# Patient Record
Sex: MALE | Race: WHITE | NOT HISPANIC OR LATINO | Employment: FULL TIME | URBAN - METROPOLITAN AREA
[De-identification: names, ages, dates, MRNs, and addresses within clinical notes are randomized per-mention and may not be internally consistent; named-entity substitution may affect disease eponyms.]

---

## 2017-08-03 ENCOUNTER — TELEPHONE (OUTPATIENT)
Dept: FAMILY MEDICINE | Facility: CLINIC | Age: 57
End: 2017-08-03

## 2017-08-03 NOTE — TELEPHONE ENCOUNTER
Panel Management Review      Patient has the following on his problem list: None      Composite cancer screening  Chart review shows that this patient is due/due soon for the following Fecal Colorectal (FIT)  Summary:    Patient is due/failing the following:   FIT    Action needed:   Patient needs non-fasting lab only appointment to  the FIT test for colon cancer screening.     Type of outreach:    Sent letter.    Questions for provider review:    None                                                                                                                                    Lisa Magill, CMA

## 2017-08-03 NOTE — LETTER
[address], [address]  [facility] [address] [phone]  August 25, 2017    Clarence Nam [place]  [address] [facility] [facility] [address]      Dear Clarence,    I care about your health and have reviewed your health plan.  I have reviewed your medical conditions, medication list, and lab results and am making recommendations  based on this review, to better manage your health.    You are particularly in need of attention regarding:  -Colon Cancer Screening     I am recommending that you:  -schedule a COLONOSCOPY to look for colon cancer (due every 10 years or 5 years in higher risk situations.)   Colon cancer is now the second leading cause of death in the [place] for both men and women and there are over 130,000 new cases and 50,000 deaths per year from colon cancer.  Colonoscopies can prevent 90-95% of these deaths.  Problem lesions can be removed before they ever become cancer.  This test is not only looking for cancer, but also getting rid of precancerious lesions.  If you do not wish to do a colonoscopy or cannot afford to do one, at this time, there is another option. It is called a FIT test or Fecal Immunochemical Occult Blood Test (take home stool sample kit).  It does not replace the colonoscopy for colorectal cancer screening, but it can detect hidden bleeding in the lower colon.  It does need to be repeated every year and if a positive result is obtained, you would be referred for a colonoscopy.  If you have completed either one of these tests at another facility, please have the records sent to our clinic so that we can best coordinate your care.   Here is a list of Health Maintenance topics that are due now or due soon:  Health Maintenance Due   Topic Date Due     COLON CANCER SCREEN (SYSTEM ASSIGNED)  01/18/2012     ADVANCE DIRECTIVE PLANNING Q5 YRS  01/18/2017     Please call us at [phone] (or use [employer]) to address the above    Recommendations.    Thank you for trusting [facility] and we appreciate the opportunity to serve you.  We look forward to supporting your healthcare needs in the future.    Healthy Regards,      Bharath Love MD

## 2018-09-25 ENCOUNTER — OFFICE VISIT (OUTPATIENT)
Dept: FAMILY MEDICINE | Facility: CLINIC | Age: 58
End: 2018-09-25
Payer: COMMERCIAL

## 2018-09-25 DIAGNOSIS — R03.0 ELEVATED BP WITHOUT DIAGNOSIS OF HYPERTENSION: ICD-10-CM
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
## 2018-09-25 DIAGNOSIS — Z12.11 SPECIAL SCREENING FOR MALIGNANT NEOPLASMS, COLON: ICD-10-CM

## 2018-09-25 DIAGNOSIS — S80.861A TICK BITE OF RIGHT LOWER LEG, INITIAL ENCOUNTER: Primary | ICD-10-CM

## 2018-09-25 PROCEDURE — 99213 OFFICE O/P EST LOW 20 MIN: CPT | Performed by: NURSE PRACTITIONER

## 2018-09-25 RX ORDER — DOXYCYCLINE 100 MG/1
100 CAPSULE ORAL 2 TIMES DAILY
Qty: 20 CAPSULE | Refills: 0 | Status: SHIPPED | OUTPATIENT
Start: 2018-09-25 | End: 2018-10-05

## 2018-09-25 NOTE — PROGRESS NOTES
"  SUBJECTIVE:   Clarence Tai is a 58 year old male who presents to clinic today for the following health issues:      Concern - Insect bite   Onset: x 3 days     Description:   Small tick was imbedded behind the Right knee- Was bulled out and there is a red sore now.    Intensity:     Progression of Symptoms:  worsening    Accompanying Signs & Symptoms:  Looks like it's getting infected    Previous history of similar problem:   none    Precipitating factors:   Worsened by: none    Alleviating factors:  Improved by: none    Therapies Tried and outcome: nothing    Pulled tick off 2 days ago.  He thinks the tick was attached for about 24 hours, maybe more.    Seeing a red ring around where the tick was.    No drainage.  No fevers.     Problem list and histories reviewed & adjusted, as indicated.  Additional history: as documented    Current Outpatient Prescriptions   Medication Sig Dispense Refill     doxycycline monohydrate 100 MG capsule Take 1 capsule (100 mg) by mouth 2 times daily for 10 days 20 capsule 0     aspirin 81 MG tablet Take 1 tablet by mouth daily.  3     Allergies   Allergen Reactions     Nkda [No Known Drug Allergies]        Reviewed and updated as needed this visit by clinical staff  Tobacco  Allergies  Meds  Problems       Reviewed and updated as needed this visit by Provider  Allergies  Meds  Problems         ROS:  Constitutional, HEENT, cardiovascular, pulmonary, gi and gu systems are negative, except as otherwise noted.    OBJECTIVE:     BP (!) 138/92  Pulse 83  Temp 98  F (36.7  C) (Oral)  Resp 12  Ht 5' 10\" (1.778 m)  Wt 203 lb 4.8 oz (92.2 kg)  SpO2 94%  BMI 29.17 kg/m2  Body mass index is 29.17 kg/(m^2).  GENERAL: healthy, alert and no distress  RESP: lungs clear to auscultation - no rales, rhonchi or wheezes  CV: regular rate and rhythm, normal S1 S2, no S3 or S4, no murmur, click or rub  MS: no gross musculoskeletal defects noted  SKIN: R calf: small bruise with erythematous " rash around it, central clearing   NEURO: Normal strength and tone, mentation intact and speech normal    Diagnostic Test Results:  none     ASSESSMENT/PLAN:   1. Tick bite of right lower leg, initial encounter  With bullseye rash.    - doxycycline monohydrate 100 MG capsule; Take 1 capsule (100 mg) by mouth 2 times daily for 10 days  Dispense: 20 capsule; Refill: 0    2. Special screening for malignant neoplasms, colon  Declines colonoscopy.   - Fecal colorectal cancer screen FIT; Future    3. Elevated BP without diagnosis of hypertension  Recheck in 2 weeks.       F/u 2 wks    HUY Leyva [facility]

## 2018-09-25 NOTE — MR AVS SNAPSHOT
After Visit Summary   9/25/2018    Clarence Tai    MRN: [id number]           Patient Information     Date Of Birth          1960        Visit Information        Provider Department      9/25/2018 3:20 PM Mamta Mata APRN CNP [facility]        Today's Diagnoses     Tick bite of right lower leg, initial encounter    -  1    Special screening for malignant neoplasms, colon           Follow-ups after your visit        Follow-up notes from your care team     Return in about 2 weeks (around 10/9/2018).      Future tests that were ordered for you today     Open Future Orders        Priority Expected Expires Ordered    Fecal colorectal cancer screen FIT Routine 10/16/2018 12/18/2018 9/25/2018            Who to contact     If you have questions or need follow up information about today's clinic visit or your schedule please contact [facility] directly at [phone].  Normal or non-critical lab and imaging results will be communicated to you by MyChart, letter or phone within 4 business days after the clinic has received the results. If you do not hear from us within 7 days, please contact the clinic through [employer] or phone. If you have a critical or abnormal lab result, we will notify you by phone as soon as possible.  Submit refill requests through [employer] or call your pharmacy and they will forward the refill request to us. Please allow 3 business days for your refill to be completed.          Additional Information About Your Visit        MyChart Information     [employer] gives you secure access to your electronic health record. If you see a primary care provider, you can also send messages to your care team and make appointments. If you have questions, please call your primary care clinic.  If you do not have a primary care provider, please call [phone] and they will assist you.        Care EveryWhere ID     This is your Care EveryWhere ID. This  "could be used by other organizations to access your [place] medical records  [id number]        Your Vitals Were     Pulse Temperature Respirations Height Pulse Oximetry BMI (Body Mass Index)    83 98  F (36.7  C) (Oral) 12 5' 10\" (1.778 m) 94% 29.17 kg/m2       Blood Pressure from Last 3 Encounters:   09/25/18 (!) 142/92   10/10/16 116/88   12/29/14 (!) 128/98    Weight from Last 3 Encounters:   09/25/18 203 lb 4.8 oz (92.2 kg)   10/10/16 198 lb (89.8 kg)   12/29/14 197 lb (89.4 kg)                 Today's Medication Changes          These changes are accurate as of 9/25/18  4:03 PM.  If you have any questions, ask your nurse or doctor.               Start taking these medicines.        Dose/Directions    doxycycline monohydrate 100 MG capsule   Used for:  Tick bite of right lower leg, initial encounter   Started by:  Mamta Mata APRN CNP        Dose:  100 mg   Take 1 capsule (100 mg) by mouth 2 times daily for 10 days   Quantity:  20 capsule   Refills:  0            Where to get your medicines      These medications were sent to [facility] [address] [place], [place] [address]     Phone:  [phone]     doxycycline monohydrate 100 MG capsule                Primary Care Provider Office Phone # Fax #    Bharath Love -[phone]       [id number]  [facility] [address]        Equal Access to Services     [facility] AH: Hadii bernie ku hadasho Soomaali, waaxda luqadaha, qaybta kaalmada adeegyada, clyde wylie. So [facility] [phone].    ATENCIÓN: Si habla español, tiene a diana disposición servicios gratuitos de asistencia lingüística. Llame al [phone].    We comply with applicable federal civil rights laws and [place] laws. We do not discriminate on the basis of race, color, national origin, age, disability, sex, sexual orientation, or gender identity.            Thank you!     Thank you for " choosing [facility]  for your care. Our goal is always to provide you with excellent care. Hearing back from our patients is one way we can continue to improve our services. Please take a few minutes to complete the written survey that you may receive in the mail after your visit with us. Thank you!             Your Updated Medication List - Protect others around you: Learn how to safely use, store and throw away your medicines at www.disposemymeds.org.          This list is accurate as of 9/25/18  4:03 PM.  Always use your most recent med list.                   Brand Name Dispense Instructions for use Diagnosis    aspirin 81 MG tablet      Take 1 tablet by mouth daily.        doxycycline monohydrate 100 MG capsule     20 capsule    Take 1 capsule (100 mg) by mouth 2 times daily for 10 days    Tick bite of right lower leg, initial encounter

## 2018-10-02 VITALS
HEART RATE: 83 BPM | DIASTOLIC BLOOD PRESSURE: 92 MMHG | BODY MASS INDEX: 29.11 KG/M2 | OXYGEN SATURATION: 94 % | TEMPERATURE: 98 F | SYSTOLIC BLOOD PRESSURE: 138 MMHG | WEIGHT: 203.3 LBS | RESPIRATION RATE: 12 BRPM | HEIGHT: 70 IN

## 2018-10-24 ENCOUNTER — OFFICE VISIT (OUTPATIENT)
Dept: FAMILY MEDICINE | Facility: CLINIC | Age: 58
End: 2018-10-24
Payer: COMMERCIAL

## 2018-10-24 VITALS
TEMPERATURE: 98.3 F | HEART RATE: 79 BPM | BODY MASS INDEX: 29.37 KG/M2 | WEIGHT: 204.7 LBS | RESPIRATION RATE: 22 BRPM | DIASTOLIC BLOOD PRESSURE: 104 MMHG | SYSTOLIC BLOOD PRESSURE: 152 MMHG | OXYGEN SATURATION: 96 %

## 2018-10-24 DIAGNOSIS — N52.9 ERECTILE DYSFUNCTION, UNSPECIFIED ERECTILE DYSFUNCTION TYPE: Primary | ICD-10-CM

## 2018-10-24 DIAGNOSIS — Z13.6 CARDIOVASCULAR SCREENING; LDL GOAL LESS THAN 130: ICD-10-CM

## 2018-10-24 DIAGNOSIS — Z12.11 SCREEN FOR COLON CANCER: ICD-10-CM

## 2018-10-24 DIAGNOSIS — I10 BENIGN ESSENTIAL HYPERTENSION: ICD-10-CM

## 2018-10-24 PROCEDURE — 99214 OFFICE O/P EST MOD 30 MIN: CPT | Performed by: FAMILY MEDICINE

## 2018-10-24 RX ORDER — LISINOPRIL 10 MG/1
10 TABLET ORAL DAILY
Qty: 30 TABLET | Refills: 1 | Status: SHIPPED | OUTPATIENT
Start: 2018-10-24 | End: 2018-11-26

## 2018-10-24 ASSESSMENT — ENCOUNTER SYMPTOMS
DYSURIA: 0
CONSTITUTIONAL NEGATIVE: 1
RESPIRATORY NEGATIVE: 1
CARDIOVASCULAR NEGATIVE: 1
FREQUENCY: 0

## 2018-10-24 NOTE — PROGRESS NOTES
SUBJECTIVE:   Clarence Nam Tai is a 58 year old male who presents to clinic today for the following health issues:    Concern - Prostate problem  Onset: ***    Description:   ***    Intensity: {.:245667}    Progression of Symptoms:  {.:884002}    Accompanying Signs & Symptoms:  ***    Previous history of similar problem:   ***    Precipitating factors:   Worsened by: ***    Alleviating factors:  Improved by: ***    Therapies Tried and outcome: ***

## 2018-10-24 NOTE — PROGRESS NOTES
HPI    SUBJECTIVE:   Clarence Tai is a 58 year old male who presents to clinic today for the following health issues:    Feels it's been about 5 years since he and his wife have last had sex.  Does note some job stress in the last few months.  Does note that he is still interested in having sex.  Does get morning erections, does not feel he ever gets erections.  No difficulty urinating (slow stream, hesitancy).  Doesn't typically get up overnight to urinate.  Does note that his energy level is not what it used to be.    No essential concerns about mood, although wife notes that he's crabby.      Review of Systems   Constitutional: Negative.    Respiratory: Negative.    Cardiovascular: Negative.    Genitourinary: Negative for dysuria and frequency.        ED         Physical Exam   Constitutional: He is oriented to person, place, and time and well-developed, well-nourished, and in no distress.   Eyes: Conjunctivae and EOM are normal.   Cardiovascular: Normal rate, regular rhythm and normal heart sounds.    Pulmonary/Chest: Effort normal and breath sounds normal.   Genitourinary: Testes/scrotum normal and penis normal. No discharge found.   Musculoskeletal: He exhibits no edema.   Neurological: He is alert and oriented to person, place, and time.   Skin: Skin is warm and dry.   Vitals reviewed.    (N52.9) Erectile dysfunction, unspecified erectile dysfunction type  (primary encounter diagnosis)  Comment: sounds like it might be more hormonal as does not have a lot of vasuclar risk, although am diagnosing HTN today, this is on themilder side of the spectrum  Plan: Testosterone Free and Total, TSH with free T4         reflex, Comprehensive metabolic panel, CBC with        platelets            (Z12.11) Screen for colon cancer  Comment:   Plan: has FIT at home    (Z13.6) CARDIOVASCULAR SCREENING; LDL GOAL LESS THAN 130  Comment:   Plan: Lipid panel reflex to direct LDL Fasting            (I10) Benign essential  hypertension  Comment: starting meds today  Plan: lisinopril (PRINIVIL/ZESTRIL) 10 MG tablet              RTC in 1m    Bharath Love MD

## 2018-10-29 DIAGNOSIS — N52.9 ERECTILE DYSFUNCTION, UNSPECIFIED ERECTILE DYSFUNCTION TYPE: ICD-10-CM

## 2018-10-29 DIAGNOSIS — Z13.6 CARDIOVASCULAR SCREENING; LDL GOAL LESS THAN 130: ICD-10-CM

## 2018-10-29 LAB
ERYTHROCYTE [DISTWIDTH] IN BLOOD BY AUTOMATED COUNT: 12 % (ref 10–15)
HCT VFR BLD AUTO: 49.2 % (ref 40–53)
HGB BLD-MCNC: 16.5 G/DL (ref 13.3–17.7)
MCH RBC QN AUTO: 30.4 PG (ref 26.5–33)
MCHC RBC AUTO-ENTMCNC: 33.5 G/DL (ref 31.5–36.5)
MCV RBC AUTO: 91 FL (ref 78–100)
PLATELET # BLD AUTO: 252 10E9/L (ref 150–450)
RBC # BLD AUTO: 5.43 10E12/L (ref 4.4–5.9)
WBC # BLD AUTO: 4.5 10E9/L (ref 4–11)

## 2018-10-29 PROCEDURE — 85027 COMPLETE CBC AUTOMATED: CPT | Performed by: FAMILY MEDICINE

## 2018-10-29 PROCEDURE — 84443 ASSAY THYROID STIM HORMONE: CPT | Performed by: FAMILY MEDICINE

## 2018-10-29 PROCEDURE — 36415 COLL VENOUS BLD VENIPUNCTURE: CPT | Performed by: FAMILY MEDICINE

## 2018-10-29 PROCEDURE — 84403 ASSAY OF TOTAL TESTOSTERONE: CPT | Performed by: FAMILY MEDICINE

## 2018-10-29 PROCEDURE — 80061 LIPID PANEL: CPT | Performed by: FAMILY MEDICINE

## 2018-10-29 PROCEDURE — 80053 COMPREHEN METABOLIC PANEL: CPT | Performed by: FAMILY MEDICINE

## 2018-10-29 PROCEDURE — 84270 ASSAY OF SEX HORMONE GLOBUL: CPT | Performed by: FAMILY MEDICINE

## 2018-10-30 LAB
ALBUMIN SERPL-MCNC: 4 G/DL (ref 3.4–5)
ALP SERPL-CCNC: 79 U/L (ref 40–150)
ALT SERPL W P-5'-P-CCNC: 43 U/L (ref 0–70)
ANION GAP SERPL CALCULATED.3IONS-SCNC: 8 MMOL/L (ref 3–14)
AST SERPL W P-5'-P-CCNC: 24 U/L (ref 0–45)
BILIRUB SERPL-MCNC: 0.5 MG/DL (ref 0.2–1.3)
BUN SERPL-MCNC: 13 MG/DL (ref 7–30)
CALCIUM SERPL-MCNC: 9 MG/DL (ref 8.5–10.1)
CHLORIDE SERPL-SCNC: 107 MMOL/L (ref 94–109)
CHOLEST SERPL-MCNC: 157 MG/DL
CO2 SERPL-SCNC: 25 MMOL/L (ref 20–32)
CREAT SERPL-MCNC: 1.03 MG/DL (ref 0.66–1.25)
GFR SERPL CREATININE-BSD FRML MDRD: 74 ML/MIN/1.7M2
GLUCOSE SERPL-MCNC: 113 MG/DL (ref 70–99)
HDLC SERPL-MCNC: 36 MG/DL
LDLC SERPL CALC-MCNC: 88 MG/DL
NONHDLC SERPL-MCNC: 121 MG/DL
POTASSIUM SERPL-SCNC: 4 MMOL/L (ref 3.4–5.3)
PROT SERPL-MCNC: 7.3 G/DL (ref 6.8–8.8)
SODIUM SERPL-SCNC: 140 MMOL/L (ref 133–144)
TRIGL SERPL-MCNC: 166 MG/DL
TSH SERPL DL<=0.005 MIU/L-ACNC: 0.66 MU/L (ref 0.4–4)

## 2018-10-31 LAB
SHBG SERPL-SCNC: 29 NMOL/L (ref 11–80)
TESTOST FREE SERPL-MCNC: 7.22 NG/DL (ref 4.7–24.4)
TESTOST SERPL-MCNC: 337 NG/DL (ref 240–950)

## 2018-11-23 ENCOUNTER — TELEPHONE (OUTPATIENT)
Dept: FAMILY MEDICINE | Facility: CLINIC | Age: 58
End: 2018-11-23

## 2018-11-23 NOTE — TELEPHONE ENCOUNTER
Reason for Call:  Form, our goal is to have forms completed with 72 hours, however, some forms may require a visit or additional information.    Type of letter, form or note:  Biometric    Who is the form from?: Patient    Where did the form come from: Patient or family brought in       What clinic location was the form placed at?: [facility]     Where the form was placed: [facility] [employer]    What number is listed as a contact on the form?: PT at [phone] (OK to LM)       Additional comments: Please complete, fax and call so he can  the original.    Call taken on 11/23/2018 at 1:28 PM by PREM BROWN

## 2018-11-26 DIAGNOSIS — I10 BENIGN ESSENTIAL HYPERTENSION: ICD-10-CM

## 2018-11-26 NOTE — TELEPHONE ENCOUNTER
Faxed form to [phone], then forms were sent to [employer] and a temporary copy is kept in a folder at the [facility] nurses station.      Milton Blanc   11/26/18 3:32 PM

## 2018-11-26 NOTE — TELEPHONE ENCOUNTER
lisinopril (PRINIVIL/ZESTRIL) 10 MG tablet   Last Written Prescription Date:  10/24/2018  Last Fill Quantity: 30,   # refills: 1  Last Office Visit: 09/25/2018  Future Office visit:    Next 5 appointments (look out 90 days)     Nov 28, 2018  4:00 PM CST   SHORT with Bharath Love MD   [facility]    [address] [place] [address], [address] [address] [phone]                   Routing refill request to provider for review/approval because:  Pt has appt on 11/28 but will be out of medication before that.  Thought appt was 11/26

## 2018-11-27 RX ORDER — LISINOPRIL 10 MG/1
10 TABLET ORAL DAILY
Qty: 30 TABLET | Refills: 0 | Status: SHIPPED | OUTPATIENT
Start: 2018-11-27 | End: 2018-11-28

## 2018-11-28 ENCOUNTER — OFFICE VISIT (OUTPATIENT)
Dept: FAMILY MEDICINE | Facility: CLINIC | Age: 58
End: 2018-11-28
Payer: COMMERCIAL

## 2018-11-28 VITALS
TEMPERATURE: 98 F | DIASTOLIC BLOOD PRESSURE: 84 MMHG | SYSTOLIC BLOOD PRESSURE: 114 MMHG | WEIGHT: 203.5 LBS | RESPIRATION RATE: 20 BRPM | BODY MASS INDEX: 29.2 KG/M2 | HEART RATE: 76 BPM

## 2018-11-28 DIAGNOSIS — R05.9 COUGH: ICD-10-CM

## 2018-11-28 DIAGNOSIS — N52.02 CORPORO-VENOUS OCCLUSIVE ERECTILE DYSFUNCTION: ICD-10-CM

## 2018-11-28 DIAGNOSIS — Z12.11 SPECIAL SCREENING FOR MALIGNANT NEOPLASMS, COLON: ICD-10-CM

## 2018-11-28 DIAGNOSIS — I10 BENIGN ESSENTIAL HYPERTENSION: Primary | ICD-10-CM

## 2018-11-28 PROCEDURE — 99214 OFFICE O/P EST MOD 30 MIN: CPT | Performed by: FAMILY MEDICINE

## 2018-11-28 PROCEDURE — 36415 COLL VENOUS BLD VENIPUNCTURE: CPT | Performed by: FAMILY MEDICINE

## 2018-11-28 PROCEDURE — 80048 BASIC METABOLIC PNL TOTAL CA: CPT | Performed by: FAMILY MEDICINE

## 2018-11-28 RX ORDER — OMEPRAZOLE 40 MG/1
40 CAPSULE, DELAYED RELEASE ORAL DAILY
Qty: 30 CAPSULE | Refills: 1 | Status: SHIPPED | OUTPATIENT
Start: 2018-11-28 | End: 2019-12-23

## 2018-11-28 RX ORDER — SILDENAFIL 100 MG/1
100 TABLET, FILM COATED ORAL DAILY PRN
Qty: 6 TABLET | Refills: 1 | Status: SHIPPED | OUTPATIENT
Start: 2018-11-28 | End: 2024-01-12

## 2018-11-28 RX ORDER — LISINOPRIL 10 MG/1
10 TABLET ORAL DAILY
Qty: 90 TABLET | Refills: 1 | Status: SHIPPED | OUTPATIENT
Start: 2018-11-28 | End: 2019-04-04

## 2018-11-28 NOTE — PROGRESS NOTES
HPI    SUBJECTIVE:   Clarence Tai is a 58 year old male who presents to clinic today for the following health issues:    Hypertension Follow-up      Outpatient blood pressures are not being checked.    Low Salt Diet: no added salt      Amount of exercise or physical activity: None    Problems taking medications regularly: No    Medication side effects: none    Diet: low salt    Tolerating meds well.  NO issues with side effects.  No CP, dyspnea, HA, vision changes, edema.    Notes has been coughing for quite a long time, years.  Preceds starting of medication.  Rare wheezing and shortness of breath.      Review of Systems   Constitutional: Negative.    Eyes: Negative for blurred vision and double vision.   Respiratory: Negative for shortness of breath.    Cardiovascular: Negative for chest pain and palpitations.   Neurological: Negative for headaches.         Physical Exam   Constitutional: He is oriented to person, place, and time and well-developed, well-nourished, and in no distress.   Eyes: Conjunctivae and EOM are normal.   Cardiovascular: Normal rate, regular rhythm and normal heart sounds.    Pulmonary/Chest: Effort normal and breath sounds normal.   Musculoskeletal: He exhibits no edema.   Neurological: He is alert and oriented to person, place, and time.   Skin: Skin is warm and dry.   Vitals reviewed.    (I10) Benign essential hypertension  (primary encounter diagnosis)  Comment: well controlled, will check electrolytes  Plan: lisinopril (PRINIVIL/ZESTRIL) 10 MG tablet,         Basic metabolic panel            (Z12.11) Special screening for malignant neoplasms, colon  Comment:   Plan: Fecal colorectal cancer screen (FIT)            (R05) Cough  Comment: trial, may be GERD  Plan: omeprazole (PRILOSEC) 40 MG DR capsule      (N52.02) Corporo-venous occlusive erectile dysfunction  Comment:   Plan: sildenafil (VIAGRA) 100 MG tablet            RTC in 1m    Bharath Love MD

## 2018-11-28 NOTE — MR AVS SNAPSHOT
After Visit Summary   11/28/2018    Clarence Tai    MRN: [id number]           Patient Information     Date Of Birth          1960        Visit Information        Provider Department      11/28/2018 4:00 PM Bharath Love MD [facility]        Today's Diagnoses     Benign essential hypertension    -  1    Special screening for malignant neoplasms, colon        Cough           Follow-ups after your visit        Follow-up notes from your care team     Return in about 1 month (around 12/28/2018).      Future tests that were ordered for you today     Open Future Orders        Priority Expected Expires Ordered    Fecal colorectal cancer screen (FIT) Routine 12/19/2018 2/20/2019 11/28/2018            Who to contact     If you have questions or need follow up information about today's clinic visit or your schedule please contact [facility] directly at [phone].  Normal or non-critical lab and imaging results will be communicated to you by MyChart, letter or phone within 4 business days after the clinic has received the results. If you do not hear from us within 7 days, please contact the clinic through [employer] or phone. If you have a critical or abnormal lab result, we will notify you by phone as soon as possible.  Submit refill requests through [employer] or call your pharmacy and they will forward the refill request to us. Please allow 3 business days for your refill to be completed.          Additional Information About Your Visit        MyChart Information     [employer] gives you secure access to your electronic health record. If you see a primary care provider, you can also send messages to your care team and make appointments. If you have questions, please call your primary care clinic.  If you do not have a primary care provider, please call [phone] and they will assist you.        Care EveryWhere ID     This is your Care EveryWhere ID. This could be  used by other organizations to access your [place] medical records  [id number]        Your Vitals Were     Pulse Temperature Respirations BMI (Body Mass Index)          76 98  F (36.7  C) (Oral) 20 29.2 kg/m2         Blood Pressure from Last 3 Encounters:   11/28/18 114/84   10/24/18 (!) 152/104   09/25/18 (!) 138/92    Weight from Last 3 Encounters:   11/28/18 203 lb 8 oz (92.3 kg)   10/24/18 204 lb 11.2 oz (92.9 kg)   09/25/18 203 lb 4.8 oz (92.2 kg)              We Performed the Following     Basic metabolic panel          Today's Medication Changes          These changes are accurate as of 11/28/18  4:35 PM.  If you have any questions, ask your nurse or doctor.               Start taking these medicines.        Dose/Directions    omeprazole 40 MG DR capsule   Commonly known as:  priLOSEC   Used for:  Cough   Started by:  Bharath Love MD        Dose:  40 mg   Take 1 capsule (40 mg) by mouth daily Take 30-60 minutes before a meal.   Quantity:  30 capsule   Refills:  1            Where to get your medicines      These medications were sent to [facility] #[address] [place], [place] [address]     Phone:  [phone]     lisinopril 10 MG tablet    omeprazole 40 MG DR capsule                Primary Care Provider Office Phone # Fax #    Bharath Love -[phone]       [id number]  [facility] [address]        Equal Access to Services     [facility] PRAMOD AH: Hadii aad ku hadasho Soomaali, waaxda luqadaha, qaybta kaalmada adeegyada, waxay af[place] wylie. So [facility] [phone].    ATENCIÓN: Si habla español, tiene a diana disposición servicios gratuitos de asistencia lingüística. Llame al [phone].    We comply with applicable federal civil rights laws and [place] laws. We do not discriminate on the basis of race, color, national origin, age, disability, sex, sexual orientation, or gender identity.            Thank  you!     Thank you for choosing [facility]  for your care. Our goal is always to provide you with excellent care. Hearing back from our patients is one way we can continue to improve our services. Please take a few minutes to complete the written survey that you may receive in the mail after your visit with us. Thank you!             Your Updated Medication List - Protect others around you: Learn how to safely use, store and throw away your medicines at www.disposemymeds.org.          This list is accurate as of 11/28/18  4:35 PM.  Always use your most recent med list.                   Brand Name Dispense Instructions for use Diagnosis    lisinopril 10 MG tablet    PRINIVIL/ZESTRIL    90 tablet    Take 1 tablet (10 mg) by mouth daily    Benign essential hypertension       omeprazole 40 MG DR capsule    priLOSEC    30 capsule    Take 1 capsule (40 mg) by mouth daily Take 30-60 minutes before a meal.    Cough

## 2018-11-30 LAB
ANION GAP SERPL CALCULATED.3IONS-SCNC: 11 MMOL/L (ref 3–14)
BUN SERPL-MCNC: 14 MG/DL (ref 7–30)
CALCIUM SERPL-MCNC: 9.2 MG/DL (ref 8.5–10.1)
CHLORIDE SERPL-SCNC: 107 MMOL/L (ref 94–109)
CO2 SERPL-SCNC: 22 MMOL/L (ref 20–32)
CREAT SERPL-MCNC: 0.99 MG/DL (ref 0.66–1.25)
GFR SERPL CREATININE-BSD FRML MDRD: 78 ML/MIN/1.7M2
GLUCOSE SERPL-MCNC: 121 MG/DL (ref 70–99)
POTASSIUM SERPL-SCNC: 4.2 MMOL/L (ref 3.4–5.3)
SODIUM SERPL-SCNC: 140 MMOL/L (ref 133–144)

## 2018-11-30 ASSESSMENT — ENCOUNTER SYMPTOMS
DOUBLE VISION: 0
PALPITATIONS: 0
BLURRED VISION: 0
SHORTNESS OF BREATH: 0
HEADACHES: 0
CONSTITUTIONAL NEGATIVE: 1

## 2019-04-04 ENCOUNTER — OFFICE VISIT (OUTPATIENT)
Dept: FAMILY MEDICINE | Facility: CLINIC | Age: 59
End: 2019-04-04
Payer: COMMERCIAL

## 2019-04-04 VITALS
DIASTOLIC BLOOD PRESSURE: 88 MMHG | SYSTOLIC BLOOD PRESSURE: 122 MMHG | BODY MASS INDEX: 28.92 KG/M2 | OXYGEN SATURATION: 96 % | HEART RATE: 95 BPM | HEIGHT: 70 IN | TEMPERATURE: 98 F | WEIGHT: 202 LBS | RESPIRATION RATE: 20 BRPM

## 2019-04-04 DIAGNOSIS — I10 BENIGN ESSENTIAL HYPERTENSION: ICD-10-CM

## 2019-04-04 DIAGNOSIS — J20.9 ACUTE BRONCHITIS WITH SYMPTOMS > 10 DAYS: Primary | ICD-10-CM

## 2019-04-04 LAB
FEF 25/75: 3.91
FEV-1: 4.7
FEV1/FVC: NORMAL
FVC: 23.61

## 2019-04-04 PROCEDURE — 99213 OFFICE O/P EST LOW 20 MIN: CPT | Mod: 25 | Performed by: FAMILY MEDICINE

## 2019-04-04 PROCEDURE — 94010 BREATHING CAPACITY TEST: CPT | Performed by: FAMILY MEDICINE

## 2019-04-04 RX ORDER — ALBUTEROL SULFATE 90 UG/1
2 AEROSOL, METERED RESPIRATORY (INHALATION) EVERY 6 HOURS PRN
Qty: 18 G | Refills: 1 | Status: SHIPPED | OUTPATIENT
Start: 2019-04-04 | End: 2020-09-25

## 2019-04-04 RX ORDER — LISINOPRIL 10 MG/1
10 TABLET ORAL DAILY
Qty: 90 TABLET | Refills: 1 | Status: SHIPPED | OUTPATIENT
Start: 2019-04-04 | End: 2020-01-10

## 2019-04-04 RX ORDER — DOXYCYCLINE HYCLATE 100 MG
100 TABLET ORAL 2 TIMES DAILY
Qty: 20 TABLET | Refills: 0 | Status: SHIPPED | OUTPATIENT
Start: 2019-04-04 | End: 2019-04-17

## 2019-04-04 RX ORDER — CODEINE PHOSPHATE AND GUAIFENESIN 10; 100 MG/5ML; MG/5ML
1-2 SOLUTION ORAL EVERY 4 HOURS PRN
Qty: 4 OZ | Refills: 0 | Status: SHIPPED | OUTPATIENT
Start: 2019-04-04 | End: 2020-09-25

## 2019-04-04 ASSESSMENT — MIFFLIN-ST. JEOR: SCORE: 1743.55

## 2019-04-04 NOTE — PROGRESS NOTES
SUBJECTIVE:   Clarence Tai is a 59 year old male who presents to clinic today for the following health issues:    Patient present with wife.     Acute Illness   Acute illness concerns: cough  Onset: 3-5weeks    Fever: no    Chills/Sweats: no    Headache (location?): no    Sinus Pressure:YES- post-nasal drainage    Conjunctivitis:  YES: bilateral    Ear Pain: no    Rhinorrhea: YES    Congestion: YES- chest    Sore Throat: no     Cough: YES-productive of green sputum, with shortness of breath, worsening over time    Wheeze: no    Decreased Appetite: no    Nausea: no    Vomiting: no    Diarrhea:  no    Dysuria/Freq.: no    Fatigue/Achiness: YES- BOTH     Sick/Strep Exposure: no     Therapies Tried and outcome: cough drops and robotussin and sleep aid.  Helped a little    Patient has been sick the past 3-5 weeks with a cough. Cough is productive. He will have coughing spells that last about 30 seconds and make him very SOB. His chest is also sore from the coughing. He has difficulty sleeping because of the cough. Last night he took robitussin. He has not tried taking his temp but he endorses feeling warm today. Also notes rhinorrhea. He has no hx of smoking but is exposed to second hand smoke. He has never been diagnosed with asthma but has been given inhalers in the past.     Wife mentions that he has had a chronic cough for many years and has been seen for this. At one point they were considering a spirometry.     Colon cancer screening  Patient declines colonoscopy and FIT tests.     Problem list and histories reviewed & adjusted, as indicated.  Additional history: as documented    Recent Labs   Lab Test 11/28/18  1640 10/29/18  0753 10/10/16  0833   LDL  --  88 96   HDL  --  36* 39*   TRIG  --  166* 126   ALT  --  43  --    CR 0.99 1.03 1.06   GFRESTIMATED 78 74 73   GFRESTBLACK >90 >90 88   POTASSIUM 4.2 4.0 3.7   TSH  --  0.66  --       BP Readings from Last 3 Encounters:   04/04/19 122/88   11/28/18 114/84  "  10/24/18 (!) 152/104    Wt Readings from Last 3 Encounters:   04/04/19 91.6 kg (202 lb)   11/28/18 92.3 kg (203 lb 8 oz)   10/24/18 92.9 kg (204 lb 11.2 oz)          Labs reviewed in EPIC    Reviewed and updated as needed this visit by clinical staff  Tobacco  Allergies  Meds  Problems  Med Hx  Surg Hx  Fam Hx  Soc Hx        Reviewed and updated as needed this visit by Provider  Meds  Problems         ROS:  Constitutional, HEENT, cardiovascular, pulmonary, gi and gu systems are negative, except as otherwise noted.    This document serves as a record of the services and decisions personally performed and made by Estelle Gutierrez MD. It was created on her behalf by Marlene Marshall, a trained medical scribe. The creation of this document is based on the provider's statements to the medical scribe.  Marlene Marshall April 4, 2019 11:36 AM     OBJECTIVE:     /88 (BP Location: Right arm, Patient Position: Sitting, Cuff Size: Adult Regular)   Pulse 95   Temp 98  F (36.7  C) (Oral)   Resp 20   Ht 1.772 m (5' 9.75\")   Wt 91.6 kg (202 lb)   SpO2 96%   BMI 29.19 kg/m    Body mass index is 29.19 kg/m .     GENERAL: healthy, alert and no distress  HENT: ear canals and TM's normal, nose and mouth without ulcers or lesions  NECK: no adenopathy, no asymmetry, masses, or scars and thyroid normal to palpation  RESP: lungs clear to auscultation - no rales, rhonchi or wheezes  CV: regular rate and rhythm, normal S1 S2, no S3 or S4, no murmur, click or rub, no peripheral edema and peripheral pulses strong  MS: no gross musculoskeletal defects noted, no edema  SKIN: no suspicious lesions or rashes  NEURO: Normal strength and tone, mentation intact and speech normal  PSYCH: mentation appears normal, affect normal/bright    Diagnostic Test Results:  No results found for this or any previous visit (from the past 24 hour(s)).    ASSESSMENT/PLAN:   (J20.9) Acute bronchitis with symptoms > 10 days  (primary encounter " diagnosis)  Comment: No wheezing or evidence of pneumonia noted on exam. Will start patient on doxycyline and albuterol. May also take robitussin AC prn. Will attempt spirometry today due to hx of chronic cough(spirometry not valid, due to coughing).    Plan: doxycycline hyclate (VIBRA-TABS) 100 MG tablet,        albuterol (PROAIR HFA/PROVENTIL HFA/VENTOLIN         HFA) 108 (90 Base) MCG/ACT inhaler,         guaiFENesin-codeine (ROBITUSSIN AC) 100-10         MG/5ML solution          (I10) Benign essential hypertension  Comment: BP stable. Discussed that in some cases, lisinopril can cause a cough. Monitor if cough dose not improve with doxycyline and albuterol.   Plan: lisinopril (PRINIVIL/ZESTRIL) 10 MG tablet          Follow up if symptoms worsen or do not improve.    The information in this document, created by the medical scribe for me, accurately reflects the services I personally performed and the decisions made by me. I have reviewed and approved this document for accuracy prior to leaving the patient care area.  April 4, 2019 11:51 AM    Estelle Gutierrez MD  [facility]

## 2019-04-04 NOTE — NURSING NOTE
"Chief Complaint   Patient presents with     Cough     Initial /88 (BP Location: Right arm, Patient Position: Sitting, Cuff Size: Adult Regular)   Pulse 95   Temp 98  F (36.7  C) (Oral)   Resp 20   Ht 1.772 m (5' 9.75\")   Wt 91.6 kg (202 lb)   SpO2 96%   BMI 29.19 kg/m   Estimated body mass index is 29.19 kg/m  as calculated from the following:    Height as of this encounter: 1.772 m (5' 9.75\").    Weight as of this encounter: 91.6 kg (202 lb).  BP completed using cuff size regular right arm    Lisa Magill, CMA    "

## 2019-04-17 ENCOUNTER — OFFICE VISIT (OUTPATIENT)
Dept: FAMILY MEDICINE | Facility: CLINIC | Age: 59
End: 2019-04-17
Payer: COMMERCIAL

## 2019-04-17 VITALS
BODY MASS INDEX: 29.45 KG/M2 | RESPIRATION RATE: 16 BRPM | DIASTOLIC BLOOD PRESSURE: 80 MMHG | SYSTOLIC BLOOD PRESSURE: 112 MMHG | WEIGHT: 203.8 LBS | HEART RATE: 64 BPM | TEMPERATURE: 98 F

## 2019-04-17 DIAGNOSIS — G56.02 CARPAL TUNNEL SYNDROME OF LEFT WRIST: Primary | ICD-10-CM

## 2019-04-17 PROCEDURE — 99214 OFFICE O/P EST MOD 30 MIN: CPT | Performed by: FAMILY MEDICINE

## 2019-04-17 ASSESSMENT — ENCOUNTER SYMPTOMS
TREMORS: 0
CONSTITUTIONAL NEGATIVE: 1
TINGLING: 1
FOCAL WEAKNESS: 0
SENSORY CHANGE: 1

## 2019-04-17 NOTE — PROGRESS NOTES
"HPI   SUBJECTIVE:   Clarence Tai is a 59 year old male who presents to clinic today for the following   health issues:      Musculoskeletal problem/pain      Duration: November 2018    Description  Location: left hand     Intensity:  moderate, 4/10    Accompanying signs and symptoms: numbness, tingling and weakness of left hand.  Burning sensation.     History  Previous similar problem: no   Previous evaluation:  none    Precipitating or alleviating factors:  Trauma or overuse: YES- maybe overuse from typing.   Aggravating factors include: lifting    Therapies tried and outcome: nothing      Feels that L thumb, index and middle finger are \"fuzzy\" and feel weak, perhaps tip of his 4th finger.  Limited to hand.  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  States he is a terrible .  Prior to that worked in the [employer].  Has never had carpal tunnel or other nerve injury.  Has had burning wake him a couple of times at night.  No loss of dexterity, but does feel he can't feel what he's holding.  No meds or interventions tried.      Review of Systems   Constitutional: Negative.    Musculoskeletal: Positive for joint pain.   Neurological: Positive for tingling and sensory change. Negative for tremors and focal weakness.         Physical Exam   Constitutional: He is oriented to person, place, and time. He appears well-developed and well-nourished.   Neurological: He is alert and oriented to person, place, and time. He displays tremor.   Positive L Tinel's.  Fine tremor noted in L thumb.   and intrinsic mm strength 5/5 KANA.   Nursing note and vitals reviewed.    (G56.02) Carpal tunnel syndrome of left wrist  (primary encounter diagnosis)  Comment: classic presentation, will start ibuprofen and ice  Plan: order for DME              RTC in 2w prn    Bharath Love MD      "

## 2019-04-17 NOTE — NURSING NOTE
"Chief Complaint   Patient presents with     Hand Pain     Initial /80 (BP Location: Right arm, Patient Position: Sitting, Cuff Size: Adult Regular)   Pulse 64   Temp 98  F (36.7  C) (Oral)   Resp 16   Wt 92.4 kg (203 lb 12.8 oz)   BMI 29.45 kg/m   Estimated body mass index is 29.45 kg/m  as calculated from the following:    Height as of 4/4/19: 1.772 m (5' 9.75\").    Weight as of this encounter: 92.4 kg (203 lb 12.8 oz).  BP completed using cuff size regular right arm    Lisa Magill, CMA    "

## 2019-05-14 ENCOUNTER — OFFICE VISIT (OUTPATIENT)
Dept: FAMILY MEDICINE | Facility: CLINIC | Age: 59
End: 2019-05-14
Payer: COMMERCIAL

## 2019-05-14 VITALS
RESPIRATION RATE: 16 BRPM | SYSTOLIC BLOOD PRESSURE: 104 MMHG | WEIGHT: 206.7 LBS | TEMPERATURE: 98 F | BODY MASS INDEX: 29.87 KG/M2 | HEART RATE: 76 BPM | DIASTOLIC BLOOD PRESSURE: 76 MMHG

## 2019-05-14 DIAGNOSIS — G56.02 LEFT CARPAL TUNNEL SYNDROME: Primary | ICD-10-CM

## 2019-05-14 PROCEDURE — 99213 OFFICE O/P EST LOW 20 MIN: CPT | Performed by: FAMILY MEDICINE

## 2019-05-14 ASSESSMENT — ENCOUNTER SYMPTOMS
FOCAL WEAKNESS: 0
SENSORY CHANGE: 1
CONSTITUTIONAL NEGATIVE: 1

## 2019-05-14 NOTE — PROGRESS NOTES
HPI    SUBJECTIVE:   Clarence Tai is a 59 year old male who presents to clinic today for the following   health issues:    Concern - Carpal tunnel syndrome of left wrist  Onset: wrist pain    Description:   Continued left wrist pain with no improvement    Intensity: moderate    Progression of Symptoms:  worsening    Accompanying Signs & Symptoms:  Numbness in his fingers; can't feel his  and is at risk of dropping this    Previous history of similar problem:   Patient is questioning if this could be work comp    Precipitating factors:   Worsened by: gripping and pressure with his fingers    Alleviating factors:  Improved by: None    Therapies Tried and outcome: wearing a brace, ibuprofen; no improvement    Was pretty good with ibuprofen and ice for at least the first 2-3 weks, doesn't feel like brace is terribly helpful either.  Noting some persistent numbness in 2-3 fingertips.  Not frankly weak, but numbness makes it difficult to manipulate objects.          Review of Systems   Constitutional: Negative.    Musculoskeletal: Positive for joint pain.   Neurological: Positive for sensory change. Negative for focal weakness.         Physical Exam   Constitutional: He is oriented to person, place, and time. He appears well-developed and well-nourished.   Neurological: He is alert and oriented to person, place, and time.   Skin: Skin is warm and dry.   Vitals reviewed.    (G56.02) Left carpal tunnel syndrome  (primary encounter diagnosis)  Comment: conservative tx not helpful, will rfer to ortho for furhter eval  Plan: ORTHO  REFERRAL              RTC in     Bharath Love MD

## 2019-06-17 ENCOUNTER — OFFICE VISIT (OUTPATIENT)
Dept: ORTHOPEDICS | Facility: CLINIC | Age: 59
End: 2019-06-17
Payer: COMMERCIAL

## 2019-06-17 VITALS
HEIGHT: 70 IN | WEIGHT: 206 LBS | DIASTOLIC BLOOD PRESSURE: 70 MMHG | BODY MASS INDEX: 29.49 KG/M2 | SYSTOLIC BLOOD PRESSURE: 98 MMHG

## 2019-06-17 DIAGNOSIS — G56.02 CARPAL TUNNEL SYNDROME OF LEFT WRIST: Primary | ICD-10-CM

## 2019-06-17 PROCEDURE — 99204 OFFICE O/P NEW MOD 45 MIN: CPT | Performed by: FAMILY MEDICINE

## 2019-06-17 RX ORDER — METHYLPREDNISOLONE 4 MG
TABLET, DOSE PACK ORAL
Qty: 21 TABLET | Refills: 0 | Status: SHIPPED | OUTPATIENT
Start: 2019-06-17 | End: 2020-09-25

## 2019-06-17 RX ORDER — MELOXICAM 15 MG/1
15 TABLET ORAL DAILY
Qty: 30 TABLET | Refills: 1 | Status: SHIPPED | OUTPATIENT
Start: 2019-06-17 | End: 2020-09-25

## 2019-06-17 ASSESSMENT — MIFFLIN-ST. JEOR: SCORE: 1761.69

## 2019-06-17 NOTE — PROGRESS NOTES
ASSESSMENT & PLAN  Patient Instructions     1. Carpal tunnel syndrome of left wrist      -Patient has left hand pain and numbness due to carpal tunnel syndrome.  -Patient will continue with wrist splint at nights when he sleeps.  -Patient will start formal hand therapy and home exercise program.  -Patient will start a steroid taper and once complete, will continue with meloxicam daily for approximately 3 weeks and then on an as-needed basis afterwards.  -We also discussed the possibility of a cortisone injection in the future   -Patient will follow-up in approximately 6 weeks for reevaluation and progression of activity  -Call direct clinic number [[phone]] at any time with questions or concerns.    Albert Yeo MD [facility]  [facility]        -----    SUBJECTIVE  Clarence Nam Tai is a/an 59 year old Right handed male who is seen in consultation at the request of  Bharath Love M.D. for evaluation of left hand pain. The patient is seen with their wife.    Onset: approximately 5-6 monhts. Patient does not recall any specific injury, he started a new job in 3/2018 where he has been doing more computer work and typing.   Location of Pain: left thumb, index, and long finger.  Rating of Pain at worst: 6/10  Rating of Pain Currently: 1/10  Worsened by: constant tingling in the hand, use of the hand causes additional pain   Better with: moving the fingers.   Treatments tried: ice, ibuprofen and casting/splinting/bracing with mild relief.  Associated symptoms:  fuzzy numbness sensation in the thumb, index, and middle fingers. He notes that he is starting to feel the same sensation. With pressure on the fingers, he will note pain in addition to the tingling.  Patient does not note weakness, but due to numbness he does not feel confident that he is holding the object.   Orthopedic history: YES - left 4th finger fracture  Relevant surgical history: NO  Social  history:  Patient started a new job in 3/2018 where he is typing daily, and new activity.  [employer]    Past Medical History:   Diagnosis Date     Nephrolithiasis      Plantar fascial fibromatosis      Social History     Socioeconomic History     Marital status:      Spouse name: Charmaine     Number of children: 2     Years of education: Not on file     Highest education level: Not on file   Occupational History     Occupation: supervisor     Employer: [employer]   Social Needs     Financial resource strain: Not on file     Food insecurity:     Worry: Not on file     Inability: Not on file     Transportation needs:     Medical: Not on file     Non-medical: Not on file   Tobacco Use     Smoking status: Passive Smoke Exposure - Never Smoker     Smokeless tobacco: Never Used     Tobacco comment: passive in home   Substance and Sexual Activity     Alcohol use: Yes     Alcohol/week: 0.0 oz     Comment: 1 per week     Drug use: No     Sexual activity: Yes     Partners: Female   Lifestyle     Physical activity:     Days per week: Not on file     Minutes per session: Not on file     Stress: Not on file   Relationships     Social connections:     Talks on phone: Not on file     Gets together: Not on file     Attends Adventism service: Not on file     Active member of club or organization: Not on file     Attends meetings of clubs or organizations: Not on file     Relationship status: Not on file     Intimate partner violence:     Fear of current or ex partner: Not on file     Emotionally abused: Not on file     Physically abused: Not on file     Forced sexual activity: Not on file   Other Topics Concern     Parent/sibling w/ CABG, MI or angioplasty before 65F 55M? Not Asked   Social History Narrative     Not on file         Patient's past medical, surgical, social, and family histories were reviewed today and no changes are noted.    REVIEW OF SYSTEMS:  10 point ROS is negative other than symptoms noted above in HPI,  "Past Medical History or as stated below  Constitutional: NEGATIVE for fever, chills, change in weight  Skin: NEGATIVE for worrisome rashes, moles or lesions  GI/: NEGATIVE for bowel or bladder changes  Neuro: NEGATIVE for weakness, dizziness or paresthesias    OBJECTIVE:  BP 98/70 (BP Location: Right arm, Patient Position: Chair, Cuff Size: Adult Regular)   Ht 1.772 m (5' 9.75\")   Wt 93.4 kg (206 lb)   BMI 29.77 kg/m     General: healthy, alert and in no distress  HEENT: no scleral icterus or conjunctival erythema  Skin: no suspicious lesions or rash. No jaundice.  CV: regular rhythm by palpation  Resp: normal respiratory effort without conversational dyspnea   Psych: normal mood and affect  Gait: normal steady gait with appropriate coordination and balance  Neuro: normal light touch sensory exam of the bilateral hands.    MSK:  LEFT HAND  Inspection:    No swelling or obvious deformity or asymmetry  Palpation:   Carpals: normal   Metacarpals: normal   Thumb: normal   Fingers: normal  Range of Motion:    Full active flexion and extension at MCP, PIP, and DIP joints; normal finger cascade without malrotation.  Wrist pronation, supination, and ulnar/radial deviation normal.  Strength:    5/5  Special Tests:    Positive: Tinel's, Phalen's      Independent visualization of the below image:  No results found for this or any previous visit (from the past 24 hour(s)).        Albert Yeo MD [facility] Orthopedic [facility]    "

## 2019-06-17 NOTE — PATIENT INSTRUCTIONS
1. Carpal tunnel syndrome of left wrist      -Patient has left hand pain and numbness due to carpal tunnel syndrome.  -Patient will continue with wrist splint at nights when he sleeps.  -Patient will start formal hand therapy and home exercise program.  -Patient will start a steroid taper and once complete, will continue with meloxicam daily for approximately 3 weeks and then on an as-needed basis afterwards.  -We also discussed the possibility of a cortisone injection in the future   -Patient will follow-up in approximately 6 weeks for reevaluation and progression of activity  -Call direct clinic number [[phone]] at any time with questions or concerns.    Albert Yeo MD [facility] Orthopedics and Sports Medicine  [facility] [place]

## 2019-06-17 NOTE — LETTER
6/17/2019         RE: Clarence Tai  [address] [address] [address]        Dear Colleague,    Thank you for referring your patient, Clarence Tai, to the [facility]. Please see a copy of my visit note below.    ASSESSMENT & PLAN  Patient Instructions     1. Carpal tunnel syndrome of left wrist      -Patient has left hand pain and numbness due to carpal tunnel syndrome.  -Patient will continue with wrist splint at nights when he sleeps.  -Patient will start formal hand therapy and home exercise program.  -Patient will start a steroid taper and once complete, will continue with meloxicam daily for approximately 3 weeks and then on an as-needed basis afterwards.  -We also discussed the possibility of a cortisone injection in the future   -Patient will follow-up in approximately 6 weeks for reevaluation and progression of activity  -Call direct clinic number [[phone]] at any time with questions or concerns.    Albert Yeo MD [facility] Orthoped[facility] and Sports Medicine  [facility]        -----    SUBJECTIVE  Clarence Tai is a/an 59 year old Right handed male who is seen in consultation at the request of  Bharath Love M.D. for evaluation of left hand pain. The patient is seen with their wife.    Onset: approximately 5-6 monhts. Patient does not recall any specific injury, he started a new job in 3/2018 where he has been doing more computer work and typing.   Location of Pain: left thumb, index, and long finger.  Rating of Pain at worst: 6/10  Rating of Pain Currently: 1/10  Worsened by: constant tingling in the hand, use of the hand causes additional pain   Better with: moving the fingers.   Treatments tried: ice, ibuprofen and casting/splinting/bracing with mild relief.  Associated symptoms:  fuzzy numbness sensation in the thumb, index, and middle fingers. He notes that he is starting to feel the same sensation. With pressure on the fingers,  he will note pain in addition to the tingling.  Patient does not note weakness, but due to numbness he does not feel confident that he is holding the object.   Orthopedic history: YES - left 4th finger fracture  Relevant surgical history: NO  Social history:  Patient started a new job in 3/2018 where he is typing daily, and new activity.  [employer]    Past Medical History:   Diagnosis Date     Nephrolithiasis      Plantar fascial fibromatosis      Social History     Socioeconomic History     Marital status:      Spouse name: Charmaine     Number of children: 2     Years of education: Not on file     Highest education level: Not on file   Occupational History     Occupation: supervisor     Employer: [employer]   Social Needs     Financial resource strain: Not on file     Food insecurity:     Worry: Not on file     Inability: Not on file     Transportation needs:     Medical: Not on file     Non-medical: Not on file   Tobacco Use     Smoking status: Passive Smoke Exposure - Never Smoker     Smokeless tobacco: Never Used     Tobacco comment: passive in home   Substance and Sexual Activity     Alcohol use: Yes     Alcohol/week: 0.0 oz     Comment: 1 per week     Drug use: No     Sexual activity: Yes     Partners: Female   Lifestyle     Physical activity:     Days per week: Not on file     Minutes per session: Not on file     Stress: Not on file   Relationships     Social connections:     Talks on phone: Not on file     Gets together: Not on file     Attends Faith service: Not on file     Active member of club or organization: Not on file     Attends meetings of clubs or organizations: Not on file     Relationship status: Not on file     Intimate partner violence:     Fear of current or ex partner: Not on file     Emotionally abused: Not on file     Physically abused: Not on file     Forced sexual activity: Not on file   Other Topics Concern     Parent/sibling w/ CABG, MI or angioplasty before 65F 55M? Not Asked  "  Social History Narrative     Not on file         Patient's past medical, surgical, social, and family histories were reviewed today and no changes are noted.    REVIEW OF SYSTEMS:  10 point ROS is negative other than symptoms noted above in HPI, Past Medical History or as stated below  Constitutional: NEGATIVE for fever, chills, change in weight  Skin: NEGATIVE for worrisome rashes, moles or lesions  GI/: NEGATIVE for bowel or bladder changes  Neuro: NEGATIVE for weakness, dizziness or paresthesias    OBJECTIVE:  BP 98/70 (BP Location: Right arm, Patient Position: Chair, Cuff Size: Adult Regular)   Ht 1.772 m (5' 9.75\")   Wt 93.4 kg (206 lb)   BMI 29.77 kg/m      General: healthy, alert and in no distress  HEENT: no scleral icterus or conjunctival erythema  Skin: no suspicious lesions or rash. No jaundice.  CV: regular rhythm by palpation  Resp: normal respiratory effort without conversational dyspnea   Psych: normal mood and affect  Gait: normal steady gait with appropriate coordination and balance  Neuro: normal light touch sensory exam of the bilateral hands.    MSK:  LEFT HAND  Inspection:    No swelling or obvious deformity or asymmetry  Palpation:   Carpals: normal   Metacarpals: normal   Thumb: normal   Fingers: normal  Range of Motion:    Full active flexion and extension at MCP, PIP, and DIP joints; normal finger cascade without malrotation.  Wrist pronation, supination, and ulnar/radial deviation normal.  Strength:    5/5  Special Tests:    Positive: Tinel's, Phalen's      Independent visualization of the below image:  No results found for this or any previous visit (from the past 24 hour(s)).        Albert Yeo MD [facility]      Again, thank you for allowing me to participate in the care of your patient.        Sincerely,        Albert Yeo, MD    "

## 2019-06-24 ENCOUNTER — THERAPY VISIT (OUTPATIENT)
Dept: OCCUPATIONAL THERAPY | Facility: CLINIC | Age: 59
End: 2019-06-24
Payer: COMMERCIAL

## 2019-06-24 DIAGNOSIS — G56.02 CARPAL TUNNEL SYNDROME OF LEFT WRIST: ICD-10-CM

## 2019-06-24 DIAGNOSIS — M79.642 PAIN OF LEFT HAND: Primary | ICD-10-CM

## 2019-06-24 PROCEDURE — 97165 OT EVAL LOW COMPLEX 30 MIN: CPT | Mod: GO | Performed by: OCCUPATIONAL THERAPIST

## 2019-06-24 PROCEDURE — 97112 NEUROMUSCULAR REEDUCATION: CPT | Mod: GO | Performed by: OCCUPATIONAL THERAPIST

## 2019-06-24 PROCEDURE — 97110 THERAPEUTIC EXERCISES: CPT | Mod: GO | Performed by: OCCUPATIONAL THERAPIST
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
## 2019-08-20 PROBLEM — M79.642 PAIN OF LEFT HAND: Status: RESOLVED | Noted: 2019-06-24 | Resolved: 2019-08-20

## 2019-08-20 NOTE — PROGRESS NOTES
Pt has not returned for therapy since 6/24/19.  Assume all goals are met to pt satisfaction.  D/C [facility].

## 2019-12-22 DIAGNOSIS — R05.9 COUGH: ICD-10-CM

## 2019-12-23 RX ORDER — OMEPRAZOLE 40 MG/1
40 CAPSULE, DELAYED RELEASE ORAL DAILY
Qty: 90 CAPSULE | Refills: 1 | Status: SHIPPED | OUTPATIENT
Start: 2019-12-23 | End: 2020-05-19

## 2019-12-23 NOTE — TELEPHONE ENCOUNTER
[place] [facility] [place]  Naomi Goff RN

## 2020-01-09 DIAGNOSIS — I10 BENIGN ESSENTIAL HYPERTENSION: ICD-10-CM

## 2020-01-09 NOTE — TELEPHONE ENCOUNTER
Routing refill request to provider for review/approval because:  Labs not current:  YUNI LEVINE RN, BSN

## 2020-01-10 RX ORDER — LISINOPRIL 10 MG/1
10 TABLET ORAL DAILY
Qty: 90 TABLET | Refills: 0 | Status: SHIPPED | OUTPATIENT
Start: 2020-01-10 | End: 2020-02-28

## 2020-01-10 NOTE — TELEPHONE ENCOUNTER
Sent message to patient to call and schedule appointment for any future refills     Liat Vora/JOJO

## 2020-01-20 ENCOUNTER — TELEPHONE (OUTPATIENT)
Dept: ORTHOPEDICS | Facility: CLINIC | Age: 60
End: 2020-01-20

## 2020-01-20 NOTE — TELEPHONE ENCOUNTER
Reason for Call:  Form, our goal is to have forms completed with 7 days, however, some forms may require a visit or additional information.    Type of letter, form or note:  Request for medical records    Who is the form from?: [employer]    Where did the form come from: form was faxed in    Phone number of person requesting form: [phone]  Can we leave a detailed message on this number:  NO    Desired completion date of form: ASAP      How will form be returned?:  fax to [phone]    Has the patient signed a consent form for release of information (may be included with form)? Not Applicable    Additional comments:     Printed out office notes and faxed them.     Lia Zepeda MS, ATC

## 2020-02-08 ENCOUNTER — HEALTH MAINTENANCE LETTER (OUTPATIENT)
Age: 60
End: 2020-02-08

## 2020-02-28 ENCOUNTER — OFFICE VISIT (OUTPATIENT)
Dept: FAMILY MEDICINE | Facility: CLINIC | Age: 60
End: 2020-02-28
Payer: COMMERCIAL

## 2020-02-28 VITALS
WEIGHT: 212.1 LBS | SYSTOLIC BLOOD PRESSURE: 110 MMHG | BODY MASS INDEX: 30.65 KG/M2 | RESPIRATION RATE: 16 BRPM | HEART RATE: 76 BPM | TEMPERATURE: 97.2 F | DIASTOLIC BLOOD PRESSURE: 86 MMHG

## 2020-02-28 DIAGNOSIS — G56.02 CARPAL TUNNEL SYNDROME OF LEFT WRIST: ICD-10-CM

## 2020-02-28 DIAGNOSIS — I10 BENIGN ESSENTIAL HYPERTENSION: ICD-10-CM

## 2020-02-28 DIAGNOSIS — Z01.818 PREOP GENERAL PHYSICAL EXAM: Primary | ICD-10-CM

## 2020-02-28 PROCEDURE — 99214 OFFICE O/P EST MOD 30 MIN: CPT | Performed by: FAMILY MEDICINE

## 2020-02-28 RX ORDER — LISINOPRIL 10 MG/1
10 TABLET ORAL DAILY
Qty: 90 TABLET | Refills: 0 | Status: CANCELLED | OUTPATIENT
Start: 2020-02-28

## 2020-02-28 RX ORDER — LISINOPRIL 10 MG/1
10 TABLET ORAL DAILY
Qty: 90 TABLET | Refills: 1 | Status: SHIPPED | OUTPATIENT
Start: 2020-02-28 | End: 2020-08-18

## 2020-02-28 NOTE — PROGRESS NOTES
[address], [address]  [facility] [address]  [phone]  Dept: [phone]    PRE-OP EVALUATION:  Today's date: 2020    Clarence Tai (: 1960) presents for pre-operative evaluation assessment as requested by Dr. Tresa Ervin.  He requires evaluation and anesthesia risk assessment prior to undergoing surgery/procedure for treatment of carpal tunnel .    Fax number for surgical facility:   Primary Physician: Bharath Love  Type of Anesthesia Anticipated: to be determined    Patient has a Health Care Directive or Living Will:  NO    Preop Questions 2020   Who is doing your surgery? tresa ervin   What are you having done? carpal tunnel   Date of Surgery/Procedure: 3-5-2020   Facility or Hospital where procedure/surgery will be performed: [facility]    [place]   1.  Do you have a history of Heart attack, stroke, stent, coronary bypass surgery, or other heart surgery? No   2.  Do you ever have any pain or discomfort in your chest? No   3.  Do you have a history of  Heart Failure? No   4.   Are you troubled by shortness of breath when:  walking on a level surface, or up a slight hill, or at night? No   5.  Do you currently have a cold, bronchitis or other respiratory infection? No   6.  Do you have a cough, shortness of breath, or wheezing? No   7.  Do you sometimes get pains in the calves of your legs when you walk? No   8. Do you or anyone in your family have previous history of blood clots? UNKNOWN    9.  Do you or does anyone in your family have a serious bleeding problem such as prolonged bleeding following surgeries or cuts? UNKNOWN    10. Have you ever had problems with anemia or been told to take iron pills? No   11. Have you had any abnormal blood loss such as black, tarry or bloody stools? No   12. Have you ever had a blood transfusion? No   13. Have you or any of your relatives ever had problems with anesthesia? UNKNOWN    14. Do  you have sleep apnea, excessive snoring or daytime drowsiness? No   15. Do you have any prosthetic heart valves? No   16. Do you have prosthetic joints? No         HPI:     HPI related to upcoming procedure: Has been working for a while to get L carpal tunnel shaped up, will be getting this taken.  Generally feeling well, no recent cold/flu sx.  Denies CP, palpitations, edema, dyspnea, HA, vision changes.      HYPERTENSION - Patient has longstanding history of HTN , currently denies any symptoms referable to elevated blood pressure. Specifically denies chest pain, palpitations, dyspnea, orthopnea, PND or peripheral edema. Blood pressure readings have been in normal range. Current medication regimen is as listed below. Patient denies any side effects of medication.       MEDICAL HISTORY:     Patient Active Problem List    Diagnosis Date Noted     Benign essential hypertension 10/29/2018     Priority: Medium     Nephrolithiasis      Priority: Medium     Superficial phlebitis of right leg 06/15/2011     Priority: Medium     Varicose veins of legs 06/15/2011     Priority: Medium     CARDIOVASCULAR SCREENING; LDL GOAL LESS THAN 130 02/10/2010     Priority: Medium     Lumbago 11/08/2007     Priority: Medium      Past Medical History:   Diagnosis Date     Nephrolithiasis      Plantar fascial fibromatosis      Past Surgical History:   Procedure Laterality Date     C APPENDECTOMY  1972     HC LAP,INGUINAL HERNIA REPR,INITIAL      x2, each side     SURGICAL HISTORY OF -       Right elbow screw due to fracture     Current Outpatient Medications   Medication Sig Dispense Refill     lisinopril (PRINIVIL/ZESTRIL) 10 MG tablet Take 1 tablet (10 mg) by mouth daily 90 tablet 0     omeprazole (PRILOSEC) 40 MG DR capsule Take 1 capsule (40 mg) by mouth daily Take 30-60 minutes before a meal. 90 capsule 1     albuterol (PROAIR HFA/PROVENTIL HFA/VENTOLIN HFA) 108 (90 Base) MCG/ACT inhaler Inhale 2 puffs into the lungs every 6 hours as  needed for shortness of breath / dyspnea 18 g 1     guaiFENesin-codeine (ROBITUSSIN AC) 100-10 MG/5ML solution Take 5-10 mLs by mouth every 4 hours as needed for cough (Patient not taking: Reported on 6/17/2019) 4 oz 0     meloxicam (MOBIC) 15 MG tablet Take 1 tablet (15 mg) by mouth daily 30 tablet 1     methylPREDNISolone (MEDROL DOSEPAK) 4 MG tablet therapy pack Follow Package Directions 21 tablet 0     order for DME Equipment being ordered: L wrist brace (Patient not taking: Reported on 6/17/2019) 1 Device 0     sildenafil (VIAGRA) 100 MG tablet Take 1 tablet (100 mg) by mouth daily as needed 30 min to 4 hrs before sex. Do not use with nitroglycerin, terazosin or doxazosin. 6 tablet 1     OTC products: None, except as noted above    Allergies   Allergen Reactions     Nkda [No Known Drug Allergies]       Latex Allergy: NO    Social History     Tobacco Use     Smoking status: Passive Smoke Exposure - Never Smoker     Smokeless tobacco: Never Used     Tobacco comment: passive in home   Substance Use Topics     Alcohol use: Yes     Alcohol/week: 0.0 standard drinks     Comment: 1 per week     History   Drug Use No       REVIEW OF SYSTEMS:   CONSTITUTIONAL: NEGATIVE for fever, chills, change in weight  ENT/MOUTH: NEGATIVE for ear, mouth and throat problems  RESP: NEGATIVE for significant cough or SOB  CV: NEGATIVE for chest pain, palpitations or peripheral edema    EXAM:   /86 (BP Location: Right arm, Patient Position: Sitting, Cuff Size: Adult Regular)   Pulse 76   Temp 97.2  F (36.2  C) (Oral)   Resp 16   Wt 96.2 kg (212 lb 1.6 oz)   BMI 30.65 kg/m    GENERAL APPEARANCE: healthy, alert and no distress  HENT: ear canals and TM's normal and nose and mouth without ulcers or lesions  RESP: lungs clear to auscultation - no rales, rhonchi or wheezes  CV: regular rate and rhythm, normal S1 S2, no S3 or S4 and no murmur, click or rub   ABDOMEN: soft, nontender, no HSM or masses and bowel sounds normal  NEURO:  Normal strength and tone, sensory exam grossly normal, mentation intact and speech normal    DIAGNOSTICS:   No labs or EKG required for low risk surgery (cataract, skin procedure, breast biopsy, etc)    Recent Labs   Lab Test 11/28/18  1640 10/29/18  0753   HGB  --  16.5   PLT  --  252    140   POTASSIUM 4.2 4.0   CR 0.99 1.03        IMPRESSION:   Reason for surgery/procedure: L carpal tunnel syndrome  Diagnosis/reason for consult: preoperative clerance    The proposed surgical procedure is considered LOW risk.    REVISED CARDIAC RISK INDEX  The patient has the following serious cardiovascular risks for perioperative complications such as (MI, PE, VFib and 3  AV Block):  No serious cardiac risks  INTERPRETATION: 0 risks: Class I (very low risk - 0.4% complication rate)    The patient has the following additional risks for perioperative complications:  No identified additional risks      ICD-10-CM    1. Preop general physical exam Z01.818    2. Screen for colon cancer Z12.11 Fecal colorectal cancer screen FIT - Future (S+30)   3. Benign essential hypertension I10 lisinopril (ZESTRIL) 10 MG tablet       RECOMMENDATIONS:         --Patient is to take all scheduled medications on the day of surgery    APPROVAL GIVEN to proceed with proposed procedure, without further diagnostic evaluation       Signed Electronically by: Bharath Love MD    Copy of this evaluation report is provided to requesting physician.    [facility] Preop Guidelines    Revised Cardiac Risk Index

## 2020-02-28 NOTE — NURSING NOTE
"Chief Complaint   Patient presents with     Pre-Op Exam     Initial /86 (BP Location: Right arm, Patient Position: Sitting, Cuff Size: Adult Regular)   Pulse 76   Temp 97.2  F (36.2  C) (Oral)   Resp 16   Wt 96.2 kg (212 lb 1.6 oz)   BMI 30.65 kg/m   Estimated body mass index is 30.65 kg/m  as calculated from the following:    Height as of 6/17/19: 1.772 m (5' 9.75\").    Weight as of this encounter: 96.2 kg (212 lb 1.6 oz).  BP completed using cuff size regular right arm    Lisa Magill, CMA    "

## 2020-05-19 DIAGNOSIS — R05.9 COUGH: ICD-10-CM

## 2020-05-19 RX ORDER — OMEPRAZOLE 40 MG/1
40 CAPSULE, DELAYED RELEASE ORAL DAILY
Qty: 90 CAPSULE | Refills: 2 | Status: SHIPPED | OUTPATIENT
Start: 2020-05-19 | End: 2020-09-25

## 2020-05-19 NOTE — TELEPHONE ENCOUNTER
Medication refilled per [facility] [place] [place] protocol    Aleksandra Ahuja RN

## 2020-05-19 NOTE — TELEPHONE ENCOUNTER
Patient changed pharmacy to [facility]      Medication Question or Refill  Who is calling: patient  What medication are you calling about (include dose and sig)?:   omeprazole (PRILOSEC) 40 MG DR capsule  90 capsule  1  12/23/2019   No    Sig - Route: Take 1 capsule (40 mg) by mouth daily Take 30-60 minutes before a meal. - Oral    Sent to pharmacy as: omeprazole (PRILOSEC) 40 MG DR capsule    Class: E-Prescribe    Order: [id number]        Controlled Substance Agreement on file: No  Who prescribed the medication?: Kate  Do you need a refill? Yes: new pharmacy and 90 day request  When did you use the medication last?   Patient offered an appointment? No  Do you have any questions or concerns?  Yes:   Requested Pharmacy: [facility]  Okay to leave a detailed message?: Yes at Home number on file [phone] (home)        Pricilla Gold

## 2020-08-18 DIAGNOSIS — I10 BENIGN ESSENTIAL HYPERTENSION: ICD-10-CM

## 2020-08-18 RX ORDER — LISINOPRIL 10 MG/1
TABLET ORAL
Qty: 30 TABLET | Refills: 0 | Status: SHIPPED | OUTPATIENT
Start: 2020-08-18 | End: 2020-09-24

## 2020-08-18 NOTE — TELEPHONE ENCOUNTER
Routing refill request to provider for review/approval because:  Labs not current:  YUNI Mixon    Visit is up to date.     Svitlana Ray RN   [facility] -- Triage Nurse

## 2020-09-23 DIAGNOSIS — I10 BENIGN ESSENTIAL HYPERTENSION: ICD-10-CM

## 2020-09-23 NOTE — TELEPHONE ENCOUNTER
Routing refill request to provider for review/approval because:  Labs not current:  Creatinine, K    Scheduled appt on 9/25/20    Carine CHAWLA RN, BSN

## 2020-09-23 NOTE — PROGRESS NOTES
"Pre-Visit Planning     Future Appointments   Date Time Provider Department Center   9/25/2020  3:30 PM Bharath Love MD [facility]     Arrival Time for this Appointment:  3:05 PM   Appointment Notes for this encounter:   physical-fasting    Questionnaires Reviewed/Assigned  Additional questionnaires assigned        Patient preferred phone number: [phone]      Spoke to patient via phone. Patient does not have additional questions or concerns.        Visit is preventive. Reviewed purpose of preventive visit with patient.    Patient is established.    Patient reminded of date and time. Barriers addressed: none  Chief complaint confirmed.  Clarified visit purpose: Physical     Health Maintenance Due   Topic Date Due     ANNUAL REVIEW OF HM ORDERS  1960     ADVANCE CARE PLANNING  1960     COLORECTAL CANCER SCREENING  01/18/1972     HIV SCREENING  01/18/1977     ZOSTER IMMUNIZATION (1 of 2) 01/18/2012     PREVENTIVE CARE VISIT  10/10/2017     PHQ-2  01/01/2020     INFLUENZA VACCINE (1) 09/01/2020     Patient is due for:  Colonoscopy, immunizations  will decide when in clinic    [employer]  Patient is active on [employer].    Questionnaire Review   Offered information on completing questionnaires via [employer].    Call Summary  \"Thank you for your time today.  If anything comes up before your appointment, please feel free to contact us at [phone].\"    "

## 2020-09-24 RX ORDER — LISINOPRIL 10 MG/1
TABLET ORAL
Qty: 30 TABLET | Refills: 0 | Status: SHIPPED | OUTPATIENT
Start: 2020-09-24 | End: 2020-09-25

## 2020-09-25 ENCOUNTER — OFFICE VISIT (OUTPATIENT)
Dept: FAMILY MEDICINE | Facility: CLINIC | Age: 60
End: 2020-09-25
Payer: COMMERCIAL

## 2020-09-25 VITALS
DIASTOLIC BLOOD PRESSURE: 83 MMHG | BODY MASS INDEX: 30.66 KG/M2 | SYSTOLIC BLOOD PRESSURE: 126 MMHG | TEMPERATURE: 97.7 F | HEART RATE: 94 BPM | OXYGEN SATURATION: 94 % | HEIGHT: 69 IN | WEIGHT: 207 LBS

## 2020-09-25 DIAGNOSIS — Z00.00 ROUTINE GENERAL MEDICAL EXAMINATION AT A HEALTH CARE FACILITY: Primary | ICD-10-CM

## 2020-09-25 DIAGNOSIS — I10 BENIGN ESSENTIAL HYPERTENSION: ICD-10-CM

## 2020-09-25 DIAGNOSIS — R05.9 COUGH: ICD-10-CM

## 2020-09-25 DIAGNOSIS — Z13.6 CARDIOVASCULAR SCREENING; LDL GOAL LESS THAN 130: ICD-10-CM

## 2020-09-25 PROCEDURE — 80048 BASIC METABOLIC PNL TOTAL CA: CPT | Performed by: FAMILY MEDICINE

## 2020-09-25 PROCEDURE — 36415 COLL VENOUS BLD VENIPUNCTURE: CPT | Performed by: FAMILY MEDICINE

## 2020-09-25 PROCEDURE — 80061 LIPID PANEL: CPT | Performed by: FAMILY MEDICINE

## 2020-09-25 PROCEDURE — 99396 PREV VISIT EST AGE 40-64: CPT | Performed by: FAMILY MEDICINE

## 2020-09-25 RX ORDER — LISINOPRIL 10 MG/1
10 TABLET ORAL DAILY
Qty: 90 TABLET | Refills: 1 | Status: SHIPPED | OUTPATIENT
Start: 2020-09-25 | End: 2021-03-02

## 2020-09-25 RX ORDER — OMEPRAZOLE 40 MG/1
40 CAPSULE, DELAYED RELEASE ORAL DAILY
Qty: 90 CAPSULE | Refills: 1 | Status: SHIPPED | OUTPATIENT
Start: 2020-09-25 | End: 2021-10-15

## 2020-09-25 RX ORDER — LISINOPRIL 10 MG/1
10 TABLET ORAL DAILY
Qty: 30 TABLET | Refills: 0 | Status: SHIPPED | OUTPATIENT
Start: 2020-09-25 | End: 2020-09-25

## 2020-09-25 ASSESSMENT — ENCOUNTER SYMPTOMS
HEMATOCHEZIA: 0
EYE PAIN: 0
DIZZINESS: 0
COUGH: 0
CONSTIPATION: 0
CHILLS: 0
NERVOUS/ANXIOUS: 0
HEMATURIA: 0
ABDOMINAL PAIN: 0
DIARRHEA: 0
FREQUENCY: 0
FEVER: 0

## 2020-09-25 ASSESSMENT — MIFFLIN-ST. JEOR: SCORE: 1749.33

## 2020-09-25 NOTE — PROGRESS NOTES
3  SUBJECTIVE:   CC: Clarence Nam Tai is an 60 year old male who presents for preventive health visit.       Patient has been advised of split billing requirements and indicates understanding: Yes   Answers for HPI/ROS submitted by the patient on 9/25/2020   Annual Exam:  Getting at least 3 servings of Calcium per day:: NO  Diet:: Regular (no restrictions)  Taking medications regularly:: Yes  Medication side effects:: None  Bi-annual eye exam:: Yes  Dental care twice a year:: Yes  Sleep apnea or symptoms of sleep apnea:: Daytime drowsiness, Excessive snoring  abdominal pain: No  Blood in stool: No  Blood in urine: No  chest pain: No  chills: No  congestion: No  constipation: No  cough: No  diarrhea: No  dizziness: No  ear pain: No  eye pain: No  nervous/anxious: No  fever: No  frequency: No  Additional concerns today:: No  Duration of exercise:: Less than 15 minutes        Is currently on amoxicillin after having a tooth pulled one week ago.  Otherwise feeling well, no acute concerns.    Is fasting.    Today's PHQ-2 Score:   PHQ-2 ( 1999 Pfizer) 9/25/2020 9/25/2020   Q1: Little interest or pleasure in doing things 0 -   Q2: Feeling down, depressed or hopeless 0 -   PHQ-2 Score 0 -   Q1: Little interest or pleasure in doing things Not at all Not at all   Q2: Feeling down, depressed or hopeless Not at all Not at all   PHQ-2 Score 0 0       Abuse: Current or Past(Physical, Sexual or Emotional)- No  Do you feel safe in your environment? Yes    Have you ever done Advance Care Planning? (For example, a Health Directive, POLST, or a discussion with a medical provider or your loved ones about your wishes): No, advance care planning information given to patient to review.  Patient declined advance care planning discussion at this time.    Social History     Tobacco Use     Smoking status: Passive Smoke Exposure - Never Smoker     Smokeless tobacco: Never Used     Tobacco comment: passive in home   Substance Use Topics      "Alcohol use: Yes     Alcohol/week: 0.0 standard drinks     Comment: 1 per week     If you drink alcohol do you typically have >3 drinks per day or >7 drinks per week? No                      Last PSA: No results found for: PSA    Reviewed orders with patient. Reviewed health maintenance and updated orders accordingly - Yes  Lab work is in process  Labs reviewed in EPIC    Reviewed and updated as needed this visit by clinical staff  Tobacco  Allergies  Meds  Med Hx  Surg Hx  Fam Hx  Soc Hx        Reviewed and updated as needed this visit by Provider  Meds            ROS:  CONSTITUTIONAL: NEGATIVE for fever, chills, change in weight  INTEGUMENTARY/SKIN: NEGATIVE for worrisome rashes, moles or lesions  EYES: NEGATIVE for vision changes or irritation  ENT: NEGATIVE for ear, mouth and throat problems  RESP: NEGATIVE for significant cough or SOB  CV: NEGATIVE for chest pain, palpitations or peripheral edema  GI: NEGATIVE for nausea, abdominal pain, heartburn, or change in bowel habits   male: negative for dysuria, hematuria, decreased urinary stream, erectile dysfunction, urethral discharge  MUSCULOSKELETAL: NEGATIVE for significant arthralgias or myalgia  NEURO: NEGATIVE for weakness, dizziness or paresthesias  PSYCHIATRIC: NEGATIVE for changes in mood or affect    OBJECTIVE:   /83 (BP Location: Right arm, Patient Position: Sitting, Cuff Size: Adult Regular)   Pulse 94   Temp 97.7  F (36.5  C) (Oral)   Ht 1.753 m (5' 9\")   Wt 93.9 kg (207 lb)   SpO2 94%   BMI 30.57 kg/m    EXAM:  GENERAL: healthy, alert and no distress  EYES: Eyes grossly normal to inspection, PERRL and conjunctivae and sclerae normal  HENT: ear canals and TM's normal, nose and mouth without ulcers or lesions  NECK: no adenopathy, no asymmetry, masses, or scars and thyroid normal to palpation  RESP: lungs clear to auscultation - no rales, rhonchi or wheezes  CV: regular rate and rhythm, normal S1 S2, no S3 or S4, no murmur, click or " "rub, no peripheral edema and peripheral pulses strong  ABDOMEN: soft, nontender, no hepatosplenomegaly, no masses and bowel sounds normal  MS: no gross musculoskeletal defects noted, no edema  SKIN: no suspicious lesions or rashes  NEURO: Normal strength and tone, mentation intact and speech normal  PSYCH: mentation appears normal, affect normal/bright    Diagnostic Test Results:  Labs reviewed in Epic    ASSESSMENT/PLAN:       ICD-10-CM    1. Routine general medical examination at a health care facility  Z00.00 REVIEW OF HEALTH MAINTENANCE PROTOCOL ORDERS     Basic metabolic panel  (Ca, Cl, CO2, Creat, Gluc, K, Na, BUN)     CANCELED: C RIV4 (FLUBLOK) VACCINE RECOMBINANT DNA PRSRV ANTIBIO FREE, IM [[id number]]   2. Benign essential hypertension  I10 lisinopril (ZESTRIL) 10 MG tablet     Basic metabolic panel  (Ca, Cl, CO2, Creat, Gluc, K, Na, BUN)   3. CARDIOVASCULAR SCREENING; LDL GOAL LESS THAN 130  Z13.6 Lipid panel reflex to direct LDL Fasting       Patient has been advised of split billing requirements and indicates understanding: Yes  COUNSELING:  Reviewed preventive health counseling, as reflected in patient instructions    Estimated body mass index is 30.57 kg/m  as calculated from the following:    Height as of this encounter: 1.753 m (5' 9\").    Weight as of this encounter: 93.9 kg (207 lb).    Weight management plan: Patient referred to endocrine and/or weight management specialty    He reports that he is a non-smoker but has been exposed to tobacco smoke. He has never used smokeless tobacco.      Counseling Resources:  ATP IV Guidelines  Pooled Cohorts Equation Calculator  FRAX Risk Assessment  ICSI Preventive Guidelines  Dietary Guidelines for Americans, 2010  USDA's MyPlate  ASA Prophylaxis  Lung CA Screening    Bharath Love MD  [facility]      "

## 2020-09-26 LAB
ANION GAP SERPL CALCULATED.3IONS-SCNC: 6 MMOL/L (ref 3–14)
BUN SERPL-MCNC: 15 MG/DL (ref 7–30)
CALCIUM SERPL-MCNC: 9.4 MG/DL (ref 8.5–10.1)
CHLORIDE SERPL-SCNC: 106 MMOL/L (ref 94–109)
CHOLEST SERPL-MCNC: 176 MG/DL
CO2 SERPL-SCNC: 26 MMOL/L (ref 20–32)
CREAT SERPL-MCNC: 1.06 MG/DL (ref 0.66–1.25)
GFR SERPL CREATININE-BSD FRML MDRD: 77 ML/MIN/{1.73_M2}
GLUCOSE SERPL-MCNC: 87 MG/DL (ref 70–99)
HDLC SERPL-MCNC: 36 MG/DL
LDLC SERPL CALC-MCNC: 114 MG/DL
NONHDLC SERPL-MCNC: 140 MG/DL
POTASSIUM SERPL-SCNC: 4 MMOL/L (ref 3.4–5.3)
SODIUM SERPL-SCNC: 138 MMOL/L (ref 133–144)
TRIGL SERPL-MCNC: 131 MG/DL

## 2020-10-21 ENCOUNTER — TELEPHONE (OUTPATIENT)
Dept: FAMILY MEDICINE | Facility: CLINIC | Age: 60
End: 2020-10-21

## 2020-10-21 NOTE — TELEPHONE ENCOUNTER
Forms/Letter Request    Name of form/letter: Data Form    Have you been seen for this request: Yes: 09/25/2020    Do we have the form/letter: Yes: PCP inbox at [facility]    When is form/letter needed by: asap    How would you like the form/letter returned: Fax [phone] AND PT would like to  original form after it has been signed and faxed    Patient Notified form requests are processed in 3-5 business days:Yes    Okay to leave a detailed message? Yes Home number on file [phone] (home)

## 2021-02-08 ENCOUNTER — TELEPHONE (OUTPATIENT)
Dept: FAMILY MEDICINE | Facility: CLINIC | Age: 61
End: 2021-02-08

## 2021-02-08 NOTE — LETTER
[facility]  [address] [address]  Phone: [phone]    February 9, 2021        Clarence Tai  [address] [address]          To whom it may concern:    RE: Clarence Browning [place]    Due to his wife's health issues and risks of complications from COVID-19, Clarence should work from home until his wife completes her COVID-19 vaccination.  He may return to work 14 days after she receives her second vaccination.    Please contact me for questions or concerns.      Sincerely,        Bharath Love MD

## 2021-02-08 NOTE — TELEPHONE ENCOUNTER
Wife had called and this was written in her chart and sent to Dr. Gutierrez.  Dr. Gutierrez advised shouldn't this be in pts chart.  See below.      Charmaine is calling wanting a letter for her husbands work asking that he continue working from home until she is fully vaccinated for COVID-19, due to her medical conditions. Clarence's return to work date is set for 2/16/21. Please call back at [phone] (H) [phone] (C) ok to leave detailed message. , Clarence [phone], will  letter when ready. [phone].    He wants to continue to work at home until his wife is vaccinated.  Pt had been working at home and now they want them to return to work.      Will forward to Dr. Love for advisal.

## 2021-02-09 NOTE — TELEPHONE ENCOUNTER
She has not been able to schedule yet. She continues to try with [place] [employer] [facility]. She is 69 turning 70 next month. They are wanting him home until she can get vaccinated due to her health issues. She says her doctor is Dr. Gutierrez and if preferred can reach out to her.     Velma CORNELL RN

## 2021-03-02 ENCOUNTER — ANCILLARY PROCEDURE (OUTPATIENT)
Dept: GENERAL RADIOLOGY | Facility: CLINIC | Age: 61
End: 2021-03-02
Attending: FAMILY MEDICINE
Payer: COMMERCIAL

## 2021-03-02 ENCOUNTER — OFFICE VISIT (OUTPATIENT)
Dept: FAMILY MEDICINE | Facility: CLINIC | Age: 61
End: 2021-03-02
Payer: COMMERCIAL

## 2021-03-02 VITALS
TEMPERATURE: 97.5 F | HEART RATE: 74 BPM | HEIGHT: 70 IN | DIASTOLIC BLOOD PRESSURE: 82 MMHG | BODY MASS INDEX: 28.82 KG/M2 | RESPIRATION RATE: 18 BRPM | WEIGHT: 201.3 LBS | SYSTOLIC BLOOD PRESSURE: 120 MMHG | OXYGEN SATURATION: 94 %

## 2021-03-02 DIAGNOSIS — M25.561 CHRONIC PAIN OF RIGHT KNEE: ICD-10-CM

## 2021-03-02 DIAGNOSIS — G89.29 CHRONIC PAIN OF RIGHT KNEE: ICD-10-CM

## 2021-03-02 DIAGNOSIS — M17.11 PRIMARY OSTEOARTHRITIS OF RIGHT KNEE: ICD-10-CM

## 2021-03-02 DIAGNOSIS — I10 BENIGN ESSENTIAL HYPERTENSION: Primary | ICD-10-CM

## 2021-03-02 PROCEDURE — 73562 X-RAY EXAM OF KNEE 3: CPT | Mod: RT | Performed by: RADIOLOGY

## 2021-03-02 PROCEDURE — 99214 OFFICE O/P EST MOD 30 MIN: CPT | Performed by: FAMILY MEDICINE

## 2021-03-02 RX ORDER — LISINOPRIL 10 MG/1
10 TABLET ORAL DAILY
Qty: 90 TABLET | Refills: 1 | Status: SHIPPED | OUTPATIENT
Start: 2021-03-02 | End: 2021-09-17

## 2021-03-02 ASSESSMENT — MIFFLIN-ST. JEOR: SCORE: 1730.37

## 2021-03-02 NOTE — PROGRESS NOTES
Assessment and Plan    (I10) Benign essential hypertension  (primary encounter diagnosis)  Comment: well controlled, refilling  Plan: lisinopril (ZESTRIL) 10 MG tablet            (M25.561,  G89.29) Chronic pain of right knee  Comment: marked medial compartment OA  Plan: XR Knee Right 3 Views            (M17.11) Primary osteoarthritis of right knee  Comment: discussed other options such as Synvisc injection, steroid injection, ortho referral (although this would mostly be to consider replacement surgery, as other options I can offer)  Plan: [facility], AND CHIROPRACTIC REFERRAL              RTC in 6m foir CPE    Bharath Love MD      Guru Lima is a 61 year old who presents for the following health issues  accompanied by his spouse:    HPI       Hypertension Follow-up      Do you check your blood pressure regularly outside of the clinic? No     Are you following a low salt diet? No    Are your blood pressures ever more than 140 on the top number (systolic) OR more   than 90 on the bottom number (diastolic), for example 140/90? UNKNOWN      How many servings of fruits and vegetables do you eat daily?  2-3    On average, how many sweetened beverages do you drink each day (Examples: soda, juice, sweet tea, etc.  Do NOT count diet or artificially sweetened beverages)?   2    How many days per week do you exercise enough to make your heart beat faster? None    How many minutes a day do you exercise enough to make your heart beat faster? None    How many days per week do you miss taking your medication? 0    Feeling well, no acute concerns.Denies CP, palpitations, edema, dyspnea, HA, vision changes.    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  Gets better when up and moving again.  Pain in in the back of his knee, R>L.  No catching or  "locking.    Review of Systems   Constitutional: Negative.    Eyes: Negative for visual disturbance.   Respiratory: Negative for shortness of breath.    Cardiovascular: Negative for chest pain, palpitations and peripheral edema.   Musculoskeletal: Positive for arthralgias and joint swelling.   Neurological: Negative for headaches.            Objective    /82 (BP Location: Right arm, Patient Position: Chair, Cuff Size: Adult Regular)   Pulse 74   Temp 97.5  F (36.4  C) (Oral)   Resp 18   Ht 1.772 m (5' 9.75\")   Wt 91.3 kg (201 lb 4.8 oz)   SpO2 94%   BMI 29.09 kg/m    Body mass index is 29.09 kg/m .  Physical Exam  Vitals signs reviewed.   Eyes:      Conjunctiva/sclera: Conjunctivae normal.   Cardiovascular:      Rate and Rhythm: Normal rate and regular rhythm.      Heart sounds: Normal heart sounds.   Pulmonary:      Effort: Pulmonary effort is normal.      Breath sounds: Normal breath sounds.   Musculoskeletal:      Right knee: He exhibits effusion. He exhibits normal range of motion, normal alignment and normal patellar mobility. No tenderness found.   Skin:     General: Skin is warm and dry.   Neurological:      Mental Status: He is alert and oriented to person, place, and time.                        "

## 2021-03-03 ENCOUNTER — THERAPY VISIT (OUTPATIENT)
Dept: PHYSICAL THERAPY | Facility: CLINIC | Age: 61
End: 2021-03-03
Attending: FAMILY MEDICINE
Payer: COMMERCIAL

## 2021-03-03 DIAGNOSIS — M25.561 CHRONIC PAIN OF RIGHT KNEE: ICD-10-CM

## 2021-03-03 DIAGNOSIS — G89.29 CHRONIC PAIN OF RIGHT KNEE: ICD-10-CM

## 2021-03-03 DIAGNOSIS — M17.11 PRIMARY OSTEOARTHRITIS OF RIGHT KNEE: ICD-10-CM

## 2021-03-03 PROCEDURE — 97110 THERAPEUTIC EXERCISES: CPT | Mod: GP | Performed by: PHYSICAL THERAPIST

## 2021-03-03 PROCEDURE — 97161 PT EVAL LOW COMPLEX 20 MIN: CPT | Mod: GP | Performed by: PHYSICAL THERAPIST

## 2021-03-03 ASSESSMENT — ACTIVITIES OF DAILY LIVING (ADL)
WALK: ACTIVITY IS NOT DIFFICULT
STAND: ACTIVITY IS NOT DIFFICULT
KNEE_ACTIVITY_OF_DAILY_LIVING_SUM: 59
SQUAT: ACTIVITY IS NOT DIFFICULT
KNEE_ACTIVITY_OF_DAILY_LIVING_SCORE: 84.29
PAIN: THE SYMPTOM AFFECTS MY ACTIVITY SLIGHTLY
GIVING WAY, BUCKLING OR SHIFTING OF KNEE: I HAVE THE SYMPTOM BUT IT DOES NOT AFFECT MY ACTIVITY
SIT WITH YOUR KNEE BENT: ACTIVITY IS MINIMALLY DIFFICULT
STIFFNESS: I HAVE THE SYMPTOM BUT IT DOES NOT AFFECT MY ACTIVITY
HOW_WOULD_YOU_RATE_THE_OVERALL_FUNCTION_OF_YOUR_KNEE_DURING_YOUR_USUAL_DAILY_ACTIVITIES?: NEARLY NORMAL
GO DOWN STAIRS: ACTIVITY IS MINIMALLY DIFFICULT
KNEEL ON THE FRONT OF YOUR KNEE: ACTIVITY IS MINIMALLY DIFFICULT
RISE FROM A CHAIR: ACTIVITY IS SOMEWHAT DIFFICULT
HOW_WOULD_YOU_RATE_THE_CURRENT_FUNCTION_OF_YOUR_KNEE_DURING_YOUR_USUAL_DAILY_ACTIVITIES_ON_A_SCALE_FROM_0_TO_100_WITH_100_BEING_YOUR_LEVEL_OF_KNEE_FUNCTION_PRIOR_TO_YOUR_INJURY_AND_0_BEING_THE_INABILITY_TO_PERFORM_ANY_OF_YOUR_USUAL_DAILY_ACTIVITIES?: 85
RAW_SCORE: 59
GO UP STAIRS: ACTIVITY IS MINIMALLY DIFFICULT
AS_A_RESULT_OF_YOUR_KNEE_INJURY,_HOW_WOULD_YOU_RATE_YOUR_CURRENT_LEVEL_OF_DAILY_ACTIVITY?: NEARLY NORMAL
LIMPING: I DO NOT HAVE THE SYMPTOM
WEAKNESS: I HAVE THE SYMPTOM BUT IT DOES NOT AFFECT MY ACTIVITY
SWELLING: I DO NOT HAVE THE SYMPTOM

## 2021-03-03 NOTE — PROGRESS NOTES
"[facility] Initial Evaluation  Subjective:    Patient Health History  Clarence Tai being seen for Right knee pain.     Problem began: 11/3/2020.   Problem occurred: Insidious   Pain is reported as 1/10 on pain scale.       Red flags:  Pain at rest/night.         Current medications:  High blood pressure medication.    Current occupation is Inventory control.   Primary job tasks include:  Computer work and prolonged sitting.                  Therapist Generated HPI Evaluation  Problem details: Arthritis in right knee, maybe in both.  Hard to stand up after prolonged sitting, 2\" will start to feel better.  One hour with knees bent.  Also, pain in bed at night when changing positions.  .         Type of problem:  Right knee.    This is a chronic condition.  Condition occurred with:  Insidious onset.  Where condition occurred: for unknown reasons.  Patient reports pain:  Posterior.  Pain is described as aching and is intermittent.  Pain is worse during the day and worse in the P.M..    Symptoms are exacerbated by ascending stairs, descending stairs, certain positions, sitting and kneeling (using a hand rail with stairs.  )  and relieved by rest.  Special tests included:  X-ray.    Restrictions due to condition include:  Working in normal job without restrictions.                          Objective:    Gait:  Mild varus B  Gait Type:  Normal     Deviations:  Hip:  Circumduction R               Lumbar/SI Evaluation  ROM:    AROM Lumbar:   Flexion:       100%  Ext:                      Side Bend:        Left:     Right:   Rotation:           Left:     Right:   Side Glide:        Left:     Right:                                                               Hip Evaluation  Hip PROM:  Hip PROM:  Left Hip:    Normal  Right Hip:  Normal                                     Knee Evaluation:  ROM:  Prom wnl knee: Hamstring B @ -20 degrees.    Strength wnl knee: SLR limited to 7 reps. rt and lt.  VMO less on " right.  Quad 15 cm proximal to mid patella, rt = 46, lt = 48 cm   AROM    Hyperextension:  Left:  143    Right: 134  Extension:  Left: 0    Right:  0    PROM        Flexion: Left: 3    Right:  3      Strength:     Extension:  Left: 5-/5   Strong/pain free  Pain:      Right: 5-/5   Strong/pain free  Pain:  Flexion:  Left: 5-/5   Strong/pain free  Pain:      Right: 5-/5    Pain:-    Quad Set Left: Good    Pain:   Quad Set Right: Good    Pain:  Ligament Testing:  Not Assessed                Special Tests: Special test for knee: PNB, lt = 125, rt = 120.    Left knee negative for the following special tests:  Patellar Tracking-Abduction Lateral    Right knee negative for the following special tests:  Patellar Tracking-Abduction Lateral  Palpation:        Right knee tenderness not present at:  Medial Joint Line; Lateral Joint Line and Patellar Lateral  Edema:  Normal      Functional Testing:          Quad:    Single Leg Squat:  Left:      Right:        Bilateral Leg Squat:   Mild loss of control              General     ROS    Assessment/Plan:    Patient is a 61 year old male with right side knee complaints.    Patient has the following significant findings with corresponding treatment plan.                Diagnosis 1:  Rt knee pain, weakness, decreased rom  Pain -  self management, education, directional preference exercise and home program  Decreased ROM/flexibility - therapeutic exercise, therapeutic activity and home program  Decreased strength - therapeutic exercise, therapeutic activities and home program  Decreased proprioception - neuro re-education, therapeutic activities and home program  Decreased function - therapeutic activities and home program    Therapy Evaluation Codes:   1) Clinical presentation characteristics are:   Stable/Uncomplicated.  2) Decision-Making    Low complexity using standardized patient assessment instrument and/or measureable assessment of functional outcome.  Cumulative Therapy  Evaluation is: Low complexity.    Previous and current functional limitations:  (See Goal Flow Sheet for this information)    Short term and Long term goals: (See Goal Flow Sheet for this information)     Communication ability:  Patient appears to be able to clearly communicate and understand verbal and written communication and follow directions correctly.  Treatment Explanation - The following has been discussed with the patient:   RX ordered/plan of care  Anticipated outcomes  Possible risks and side effects  This patient would benefit from PT intervention to resume normal activities.   Rehab potential is good.    Frequency:  1 X week, once daily  Duration:  for 6 weeks  Discharge Plan:  Achieve all LTG.  Independent in home treatment program.  Reach maximal therapeutic benefit.    Please refer to the daily flowsheet for treatment today, total treatment time and time spent performing 1:1 timed codes.

## 2021-03-04 ASSESSMENT — ENCOUNTER SYMPTOMS
JOINT SWELLING: 1
ARTHRALGIAS: 1
PALPITATIONS: 0
HEADACHES: 0
CONSTITUTIONAL NEGATIVE: 1
SHORTNESS OF BREATH: 0

## 2021-03-17 ENCOUNTER — THERAPY VISIT (OUTPATIENT)
Dept: PHYSICAL THERAPY | Facility: CLINIC | Age: 61
End: 2021-03-17
Payer: COMMERCIAL

## 2021-03-17 DIAGNOSIS — G89.29 CHRONIC PAIN OF RIGHT KNEE: ICD-10-CM

## 2021-03-17 DIAGNOSIS — M25.561 CHRONIC PAIN OF RIGHT KNEE: ICD-10-CM

## 2021-03-17 PROCEDURE — 97110 THERAPEUTIC EXERCISES: CPT | Mod: GP | Performed by: PHYSICAL THERAPIST

## 2021-04-05 ENCOUNTER — THERAPY VISIT (OUTPATIENT)
Dept: PHYSICAL THERAPY | Facility: CLINIC | Age: 61
End: 2021-04-05
Payer: COMMERCIAL

## 2021-04-05 DIAGNOSIS — M25.561 CHRONIC PAIN OF RIGHT KNEE: ICD-10-CM

## 2021-04-05 DIAGNOSIS — G89.29 CHRONIC PAIN OF RIGHT KNEE: ICD-10-CM

## 2021-04-05 PROCEDURE — 97530 THERAPEUTIC ACTIVITIES: CPT | Mod: GP | Performed by: PHYSICAL THERAPIST

## 2021-04-05 PROCEDURE — 97110 THERAPEUTIC EXERCISES: CPT | Mod: GP | Performed by: PHYSICAL THERAPIST

## 2021-05-03 ENCOUNTER — THERAPY VISIT (OUTPATIENT)
Dept: PHYSICAL THERAPY | Facility: CLINIC | Age: 61
End: 2021-05-03
Payer: COMMERCIAL

## 2021-05-03 DIAGNOSIS — G89.29 CHRONIC PAIN OF RIGHT KNEE: ICD-10-CM

## 2021-05-03 DIAGNOSIS — M25.561 CHRONIC PAIN OF RIGHT KNEE: ICD-10-CM

## 2021-05-03 PROCEDURE — 97110 THERAPEUTIC EXERCISES: CPT | Mod: GP | Performed by: PHYSICAL THERAPIST

## 2021-05-03 PROCEDURE — 97530 THERAPEUTIC ACTIVITIES: CPT | Mod: GP | Performed by: PHYSICAL THERAPIST

## 2021-07-06 PROBLEM — G89.29 CHRONIC PAIN OF RIGHT KNEE: Status: RESOLVED | Noted: 2021-03-03 | Resolved: 2021-07-06

## 2021-07-06 PROBLEM — M25.561 CHRONIC PAIN OF RIGHT KNEE: Status: RESOLVED | Noted: 2021-03-03 | Resolved: 2021-07-06

## 2021-07-06 NOTE — PROGRESS NOTES
"Discharge Note    Progress reporting period is from initial evaluation date (please see noted date below) to May 3, 2021.  Linked Episodes   Type: Episode: Status: Noted: Resolved: Last update: Updated by:   PHYSICAL THERAPY Right knee pain3/03/2021 Active 3/3/2021  5/3/2021  3:35 PM Bryce Murrieta, PT      Comments:       Please see information below for last relevant information on current status.  Patient seen for 4 visits.    SUBJECTIVE  Subjective changes noted by patient:  Drove 240 miles this past weekend.  18 steps in/out of housing.  Both knees are sore today.  Did stairs @ 10x/day.   Still biking.    .  Current pain level is (8/10 sit to stand, 1/10 after 10\" of standing. ).     Previous pain level was  1/10.   Changes in function:  Yes (See Goal flowsheet attached for changes in current functional level)  Adverse reaction to treatment or activity: None    OBJECTIVE  Changes noted in objective findings: AROM lt 0-0-140, rt 0-0-138. Advanced glute med strengthening, stair training, step ups.  See PTRx.  Difficulty with step ups (backwards) at 6\".  lowered to 3\" for HEP.  Difficulty with SL balance for hip abd.       ASSESSMENT/PLAN  Diagnosis: Rt knee pain   Updated problem list and treatment plan:   Decreased ROM/flexibility - HEP  Decreased function - HEP  Decreased strength - HEP  STG/LTGs have been met or progress has been made towards goals:  Yes, please see goal flowsheet for most current information  Assessment of Progress: current status is unknown.    Last current status:     Self Management Plans:  HEP  I have re-evaluated this patient and find that the nature, scope, duration and intensity of the therapy is appropriate for the medical condition of the patient.  Clarence continues to require the following intervention to meet STG and LTG's:  HEP.    Recommendations:  Discharge with current home program.  Patient to follow up with MD as needed.    Please refer to the daily flowsheet for treatment " today, total treatment time and time spent performing 1:1 timed codes.

## 2021-08-04 ENCOUNTER — TELEPHONE (OUTPATIENT)
Dept: FAMILY MEDICINE | Facility: CLINIC | Age: 61
End: 2021-08-04

## 2021-08-04 NOTE — TELEPHONE ENCOUNTER
Patient wondering if PCP does cortisone injections or if needs referral to ortho for that. His knee pain is still prevalent after doing PT and is interested in injections. Please advise and return call to patient, patient states it is okay to talk with his spouse, Charmaine, about this concern.  -Jaylin De Anda

## 2021-08-05 NOTE — TELEPHONE ENCOUNTER
I do know he does joint injections...   Not sure what his process is for scheduling. He would need to decide about if indicated or not.  He will be here on Monday if there are other questions.

## 2021-08-20 ENCOUNTER — OFFICE VISIT (OUTPATIENT)
Dept: FAMILY MEDICINE | Facility: CLINIC | Age: 61
End: 2021-08-20
Payer: COMMERCIAL

## 2021-08-20 ENCOUNTER — ANCILLARY PROCEDURE (OUTPATIENT)
Dept: GENERAL RADIOLOGY | Facility: CLINIC | Age: 61
End: 2021-08-20
Attending: FAMILY MEDICINE
Payer: COMMERCIAL

## 2021-08-20 ENCOUNTER — TELEPHONE (OUTPATIENT)
Dept: FAMILY MEDICINE | Facility: CLINIC | Age: 61
End: 2021-08-20

## 2021-08-20 VITALS
WEIGHT: 209.2 LBS | TEMPERATURE: 98.1 F | BODY MASS INDEX: 29.95 KG/M2 | RESPIRATION RATE: 16 BRPM | HEART RATE: 64 BPM | SYSTOLIC BLOOD PRESSURE: 110 MMHG | OXYGEN SATURATION: 95 % | DIASTOLIC BLOOD PRESSURE: 80 MMHG | HEIGHT: 70 IN

## 2021-08-20 DIAGNOSIS — M17.0 PRIMARY OSTEOARTHRITIS OF BOTH KNEES: Primary | ICD-10-CM

## 2021-08-20 PROCEDURE — 73562 X-RAY EXAM OF KNEE 3: CPT | Mod: LT | Performed by: RADIOLOGY

## 2021-08-20 PROCEDURE — 99214 OFFICE O/P EST MOD 30 MIN: CPT | Performed by: FAMILY MEDICINE

## 2021-08-20 ASSESSMENT — ENCOUNTER SYMPTOMS
PALPITATIONS: 0
HEADACHES: 0
SHORTNESS OF BREATH: 0
ARTHRALGIAS: 1
CONSTITUTIONAL NEGATIVE: 1

## 2021-08-20 ASSESSMENT — MIFFLIN-ST. JEOR: SCORE: 1766.2

## 2021-08-20 ASSESSMENT — PAIN SCALES - GENERAL: PAINLEVEL: MILD PAIN (2)

## 2021-08-20 NOTE — TELEPHONE ENCOUNTER
Patient needs you to contact his insurance for a   Prior authorization for the injection without ultrasound    Call [facility] provider line  [phone]  Member # [id number] (if needed)    Call patient if questions  [phone] ok to leave message    If this can be started today that would be great

## 2021-08-20 NOTE — TELEPHONE ENCOUNTER
Received a call from Nissa with [facility] stating that the patient still needs a prior authorization for the MEDICATION that is administered.  Patient does NOT need a prior auth for giving the injection.    Giving the injection:  CPT 02012--Qs prior auth needed  Administering medication (Synvisc):  --Needs Prior Auth    Please call Provider Services at [phone] and follow the prompts for notifications.    Naomi Goff RN

## 2021-08-20 NOTE — TELEPHONE ENCOUNTER
Called [facility] for PA, they transferred me to [facility] [phone] as the medication is a specialty medication.  Covered Medications:  Gel syn 3   Euflexxa   Derolan   Huddled with PCP, PCP recommends that we prescribe Rx to pharmacy, patient picks up and brings to appt for injection. Spoke with patient who is okay with this plan. Please send Rx to selected pharmacy.  -Jaylin De Anda

## 2021-08-20 NOTE — TELEPHONE ENCOUNTER
Spoke with representative from [facility], advises no prior authorization needed for procedures done in clinic.  Reference # [id number]  Advised patient.  -Jaylin De Anda

## 2021-08-20 NOTE — PROGRESS NOTES
"    Assessment and Plan    (M17.0) Primary osteoarthritis of both knees  (primary encounter diagnosis)  Comment: to confirm that he does have KANA OA, will check with insurance for coverage for SynVisc One, may schedule injections as soon as that is arranged  Plan: XR Knee Left 3 Views              RTC in 1m    Bharath Love MD      Guru Lima is a 61 year old who presents for the following health issues     HPI       Pain History: Knee pain  When did you first notice your pain? - More than 6 weeks    Have you seen this provider for your pain in the past?   Yes   Where in your body do you have pain? Both knees  Are you seeing anyone else for your pain? Not at this time    Has known OA, Has been doing PT and ot seeing benefit, would like to avoid steroid injection.  Interested in hyaluronic acid injection.    Is frustrated that we didn't call him back with order number, still doesn't know if this will be covered, needs to have this pre-authorized.      PEG Score 8/20/2021   PEG Total Score 6       PDMP Review     None        Last CSA Agreement:   Patient-Level CSA:    There are no patient-level csa.       Last UDS:           Review of Systems   Constitutional: Negative.    Eyes: Negative for visual disturbance.   Respiratory: Negative for shortness of breath.    Cardiovascular: Negative for chest pain, palpitations and peripheral edema.   Musculoskeletal: Positive for arthralgias. Negative for gait problem.   Neurological: Negative for headaches.            Objective    /80 (BP Location: Right arm, Patient Position: Sitting, Cuff Size: Adult Large)   Pulse 64   Temp 98.1  F (36.7  C) (Oral)   Resp 16   Ht 1.772 m (5' 9.75\")   Wt 94.9 kg (209 lb 3.2 oz)   SpO2 95%   BMI 30.23 kg/m    Body mass index is 30.23 kg/m .  Physical Exam  Vitals and nursing note reviewed.   Constitutional:       Appearance: Normal appearance.   Skin:     General: Skin is warm and dry.   Neurological:      General: No focal " deficit present.      Mental Status: He is alert and oriented to person, place, and time.

## 2021-08-23 RX ORDER — HYALURONATE SODIUM 16.8MG/2ML
2 SYRINGE (ML) INTRAARTICULAR ONCE
Qty: 2 ML | Refills: 0 | Status: SHIPPED | OUTPATIENT
Start: 2021-08-23 | End: 2021-08-23

## 2021-08-24 NOTE — TELEPHONE ENCOUNTER
Spoke with insurance company again, advises that Rx is covered if given in clinic vs sending to pharmacy for Gelsyn-3 16.8MG/2ML Syringes. Asking supply chain to order medication for procedural administration.  -Jaylin De Anda

## 2021-08-24 NOTE — TELEPHONE ENCOUNTER
Received request for other medication as Sodium Hyaluronate, Viscosup, (GELSYN-3) 16.8 MG/2ML SOSY is NOT covered by insurance. Please review meds and advise if changing Rx or if want to start PA.  -Jaylin De Anda

## 2021-08-26 NOTE — TELEPHONE ENCOUNTER
Able to order the Euflexxa that is covered as an in clinic procedure. Scheduled patient for 3 separate appointments on 10/1/21, 10/8/21 and 10/15/21.  -Jaylin De Anda

## 2021-08-30 ENCOUNTER — LAB (OUTPATIENT)
Dept: URGENT CARE | Facility: URGENT CARE | Age: 61
End: 2021-08-30
Attending: EMERGENCY MEDICINE
Payer: COMMERCIAL

## 2021-08-30 ENCOUNTER — E-VISIT (OUTPATIENT)
Dept: URGENT CARE | Facility: CLINIC | Age: 61
End: 2021-08-30
Payer: COMMERCIAL

## 2021-08-30 DIAGNOSIS — Z20.822 SUSPECTED COVID-19 VIRUS INFECTION: ICD-10-CM

## 2021-08-30 DIAGNOSIS — Z20.822 SUSPECTED COVID-19 VIRUS INFECTION: Primary | ICD-10-CM

## 2021-08-30 LAB — SARS-COV-2 RNA RESP QL NAA+PROBE: POSITIVE

## 2021-08-30 PROCEDURE — U0003 INFECTIOUS AGENT DETECTION BY NUCLEIC ACID (DNA OR RNA); SEVERE ACUTE RESPIRATORY SYNDROME CORONAVIRUS 2 (SARS-COV-2) (CORONAVIRUS DISEASE [COVID-19]), AMPLIFIED PROBE TECHNIQUE, MAKING USE OF HIGH THROUGHPUT TECHNOLOGIES AS DESCRIBED BY CMS-2020-01-R: HCPCS

## 2021-08-30 PROCEDURE — 99421 OL DIG E/M SVC 5-10 MIN: CPT | Performed by: EMERGENCY MEDICINE

## 2021-08-30 PROCEDURE — U0005 INFEC AGEN DETEC AMPLI PROBE: HCPCS

## 2021-08-30 NOTE — PATIENT INSTRUCTIONS
Dear Clarence Tai,    Your symptoms show that you may have coronavirus (COVID-19). This illness can cause fever, cough and trouble breathing. Many people get a mild case and get better on their own. Some people can get very sick.    Will I be tested for COVID-19?  We would like to test you for Covid-19 virus. I have placed orders for this test.     To schedule: go to your [employer] home page and scroll down to the section that says  You have an appointment that needs to be scheduled  and click the large green button that says  Schedule Now  and follow the steps to find the next available openings.    If you are unable to complete these [employer] scheduling steps, please call [phone] to schedule your testing.     Return to work/school/ guidance:  Please let your workplace manager and staffing office know when your quarantine ends     We can t give you an exact date as it depends on the above. You can calculate this on your own or work with your manager/staffing office to calculate this. (For example if you were exposed on 10/4, you would have to quarantine for 14 full days. That would be through 10/18. You could return on 10/19.)      If you receive a positive COVID-19 test result, follow the guidance of the those who are giving you the results. Usually the return to work is 10 (or in some cases 20 days from symptom onset.) If you work at [facility], you must also be cleared by Employee Occupational Health and Safety to return to work.        If you receive a negative COVID-19 test result and did not have a high risk exposure to someone with a known positive COVID-19 test, you can return to work once you're free of fever for 24 hours without fever-reducing medication and your symptoms are improving or resolved.      If you receive a negative COVID-19 test and If you had a high risk exposure to someone who has tested positive for COVID-19 then you can return to work 14 days after your last contact  with the positive individual    Note: If you have ongoing exposure to the covid positive person, this quarantine period may be more than 14 days. (For example, if you are continued to be exposed to your child who tested positive and cannot isolate from them, then the quarantine of 7-14 days can't start until your child is no longer contagious. This is typically 10 days from onset of the child's symptoms. So the total duration may be 17-24 days in this case.)    Sign up for [employer].   We know it's scary to hear that you might have COVID-19. We want to track your symptoms to make sure you're okay over the next 2 weeks. Please look for an email from [employer]--this is a free, online program that we'll use to keep in touch. To sign up, follow the link in the email you will receive. Learn more at [email or web address]    How can I take care of myself?    Get lots of rest. Drink extra fluids (unless a doctor has told you not to)    Take Tylenol (acetaminophen) or ibuprofen for fever or pain. If you have liver or kidney problems, ask your family doctor if it's okay to take Tylenol o ibuprofen    If you have other health problems (like cancer, heart failure, an organ transplant or severe kidney disease): Call your specialty clinic if you don't feel better in the next 2 days.    Know when to call 911. Emergency warning signs include:  o Trouble breathing or shortness of breath  o Pain or pressure in the chest that doesn't go away  o Feeling confused like you haven't felt before, or not being able to wake up  o Bluish-colored lips or face    Where can I get more information?   [employer] - About COVID-19:   [email or web address]    CDC - What to Do If You're Sick:   www.cdc.gov/coronavirus/2019-ncov/about/steps-when-sick.html    August 30, 2021  RE:  Clarence Tai                                                                                                                  [id number]  [facility] [address]      To whom it may concern:    I evaluated Clarence Tai on August 30, 2021. Clarence Tai should be excused from work/school.     They should let their workplace manager and staffing office know when their quarantine ends.    We can not give an exact date as it depends on the information below. They can calculate this on their own or work with their manager/staffing office to calculate this. (For example if they were exposed on 10/04, they would have to quarantine for 14 full days. That would be through 10/18. They could return on 10/19.)    Quarantine Guidelines:      If patient receives a positive COVID-19 test result, they should follow the guidance of those who are giving the results. Usually the return to work is 10 (or in some cases 20 days from symptom onset.) If they work at [employer], they must be cleared by Employee Occupational Health and Safety to return to work.        If patient receives a negative COVID-19 test result and did not have a high risk exposure to someone with a known positive COVID-19 test, they can return to work once they're free of fever for 24 hours without fever-reducing medication and their symptoms are improving or resolved.      If patient receives a negative COVID-19 test and if they had a high risk exposure to someone who has tested positive for COVID-19 then they can return to work 14 days after their last contact with the positive individual    Note: If there is ongoing exposure to the covid positive person, this quarantine period may be longer than 14 days. (For example, if they are continually exposed to their child, who tested positive and cannot isolate from them, then the quarantine of 7-14 days can't start until their child is no longer contagious. This is typically 10 days from onset to the child's symptoms. So the total duration may be 17-24 days in this case.)     Sincerely,  Marco Lo MD

## 2021-09-05 ENCOUNTER — HEALTH MAINTENANCE LETTER (OUTPATIENT)
Age: 61
End: 2021-09-05

## 2021-09-16 DIAGNOSIS — I10 BENIGN ESSENTIAL HYPERTENSION: ICD-10-CM

## 2021-09-17 RX ORDER — LISINOPRIL 10 MG/1
TABLET ORAL
Qty: 90 TABLET | Refills: 0 | Status: SHIPPED | OUTPATIENT
Start: 2021-09-17 | End: 2021-12-07

## 2021-09-17 NOTE — TELEPHONE ENCOUNTER
Patient has an upcoming appointment with Dr. Love on 10/1/2021.  Note placed in appt notes to check BMP.  Will give refill to get him through.    Naomi Goff RN

## 2021-10-01 ENCOUNTER — OFFICE VISIT (OUTPATIENT)
Dept: FAMILY MEDICINE | Facility: CLINIC | Age: 61
End: 2021-10-01
Payer: COMMERCIAL

## 2021-10-01 VITALS
DIASTOLIC BLOOD PRESSURE: 80 MMHG | OXYGEN SATURATION: 96 % | BODY MASS INDEX: 30.67 KG/M2 | HEART RATE: 69 BPM | RESPIRATION RATE: 20 BRPM | SYSTOLIC BLOOD PRESSURE: 116 MMHG | WEIGHT: 212.2 LBS | TEMPERATURE: 98 F

## 2021-10-01 DIAGNOSIS — Z23 NEED FOR PROPHYLACTIC VACCINATION AND INOCULATION AGAINST INFLUENZA: Primary | ICD-10-CM

## 2021-10-01 DIAGNOSIS — M17.0 PRIMARY OSTEOARTHRITIS OF BOTH KNEES: ICD-10-CM

## 2021-10-01 PROCEDURE — 90682 RIV4 VACC RECOMBINANT DNA IM: CPT | Performed by: FAMILY MEDICINE

## 2021-10-01 PROCEDURE — 90471 IMMUNIZATION ADMIN: CPT | Performed by: FAMILY MEDICINE

## 2021-10-01 PROCEDURE — 20610 DRAIN/INJ JOINT/BURSA W/O US: CPT | Mod: 50 | Performed by: FAMILY MEDICINE

## 2021-10-01 RX ORDER — HYALURONATE SODIUM 10 MG/ML
20 SYRINGE (ML) INTRAARTICULAR WEEKLY
Status: DISCONTINUED | OUTPATIENT
Start: 2021-10-15 | End: 2022-08-09

## 2021-10-01 RX ADMIN — Medication 20 MG: at 13:54

## 2021-10-01 RX ADMIN — Medication 20 MG: at 13:51

## 2021-10-01 NOTE — PROGRESS NOTES
Assessment and Plan    (Z23) Need for prophylactic vaccination and inoculation against influenza  (primary encounter diagnosis)  Comment:   Plan: VACCINE ADMINISTRATION, INITIAL, INFLUENZA         QUAD, RECOMBINANT, P-FREE (RIV4) (FLUBLOK)            (M17.0) Primary osteoarthritis of both knees  Comment: Comment: Verbal consent obtained.  L knee prepped with betadine swab for lateral approach injection.  Anesthesia with 1% lidocaine with epi.  Contents of pre-loaded hyaluronic acid syringe injected.  Patient tolerated procedure well.  Identical procedure repeated on right.    Plan: sodium hyaluronate (EUFLEXXA) injection 20 mg,         sodium hyaluronate (EUFLEXXA) injection 20 mg,         DRAIN/INJECT LARGE JOINT/BURSA              RTC in 1w for next injection    Bharath Love MD

## 2021-10-08 ENCOUNTER — OFFICE VISIT (OUTPATIENT)
Dept: FAMILY MEDICINE | Facility: CLINIC | Age: 61
End: 2021-10-08
Payer: COMMERCIAL

## 2021-10-08 VITALS
OXYGEN SATURATION: 96 % | DIASTOLIC BLOOD PRESSURE: 76 MMHG | SYSTOLIC BLOOD PRESSURE: 90 MMHG | TEMPERATURE: 98.1 F | BODY MASS INDEX: 30.49 KG/M2 | RESPIRATION RATE: 14 BRPM | HEART RATE: 92 BPM | WEIGHT: 211 LBS

## 2021-10-08 DIAGNOSIS — M17.0 PRIMARY OSTEOARTHRITIS OF BOTH KNEES: Primary | ICD-10-CM

## 2021-10-08 PROCEDURE — 20610 DRAIN/INJ JOINT/BURSA W/O US: CPT | Mod: 50 | Performed by: FAMILY MEDICINE

## 2021-10-08 RX ORDER — HYALURONATE SODIUM 10 MG/ML
20 SYRINGE (ML) INTRAARTICULAR WEEKLY
Status: DISCONTINUED | OUTPATIENT
Start: 2021-10-22 | End: 2022-08-09

## 2021-10-08 RX ADMIN — Medication 20 MG: at 15:16

## 2021-10-08 RX ADMIN — Medication 20 MG: at 15:15

## 2021-10-08 NOTE — PROGRESS NOTES
Assessment and Plan    (M17.0) Primary osteoarthritis of both knees  (primary encounter diagnosis)  Comment: Verbal consent obtained.  L knee prepped with betadine swab for lateral approach injection.  Anesthesia with 1% lidocaine with epi.  Contents of pre-loaded hyaluronic acid syringe injected.  Patient tolerated procedure well.  Identical procedure repeated on right.       Plan: DRAIN/INJECT LARGE JOINT/BURSA, sodium         hyaluronate (EUFLEXXA) injection 20 mg, sodium         hyaluronate (EUFLEXXA) injection 20 mg              RTC in 1w    Bharath Love MD      Guru Lima is a 61 year old who presents for the following health issues     HPI     Patient here today to have injections done on bilateral knees. 2 of 3 hyaluronic acid injections.    Not noting much improvement yet.    Review of Systems         Objective    BP 90/76 (BP Location: Right arm, Patient Position: Sitting, Cuff Size: Adult Regular)   Pulse 92   Temp 98.1  F (36.7  C) (Oral)   Resp 14   Wt 95.7 kg (211 lb)   SpO2 96%   BMI 30.49 kg/m    Body mass index is 30.49 kg/m .  Physical Exam

## 2021-10-15 ENCOUNTER — OFFICE VISIT (OUTPATIENT)
Dept: FAMILY MEDICINE | Facility: CLINIC | Age: 61
End: 2021-10-15
Payer: COMMERCIAL

## 2021-10-15 VITALS
HEIGHT: 70 IN | BODY MASS INDEX: 30.61 KG/M2 | WEIGHT: 213.8 LBS | DIASTOLIC BLOOD PRESSURE: 78 MMHG | TEMPERATURE: 97.9 F | OXYGEN SATURATION: 95 % | SYSTOLIC BLOOD PRESSURE: 102 MMHG | HEART RATE: 92 BPM | RESPIRATION RATE: 18 BRPM

## 2021-10-15 DIAGNOSIS — M17.0 PRIMARY OSTEOARTHRITIS OF BOTH KNEES: ICD-10-CM

## 2021-10-15 DIAGNOSIS — Z12.11 SCREEN FOR COLON CANCER: Primary | ICD-10-CM

## 2021-10-15 PROCEDURE — 20610 DRAIN/INJ JOINT/BURSA W/O US: CPT | Mod: 50 | Performed by: FAMILY MEDICINE

## 2021-10-15 RX ORDER — HYALURONATE SODIUM 10 MG/ML
20 SYRINGE (ML) INTRAARTICULAR WEEKLY
Status: DISCONTINUED | OUTPATIENT
Start: 2021-10-29 | End: 2022-08-09

## 2021-10-15 RX ADMIN — Medication 20 MG: at 15:21

## 2021-10-15 RX ADMIN — Medication 20 MG: at 15:22

## 2021-10-15 ASSESSMENT — MIFFLIN-ST. JEOR: SCORE: 1787.07

## 2021-10-15 ASSESSMENT — PAIN SCALES - GENERAL: PAINLEVEL: MILD PAIN (2)

## 2021-10-15 NOTE — PROGRESS NOTES
"    Assessment and Plan        (M17.0) Primary osteoarthritis of both knees  Comment: Verbal consent obtained.  L knee prepped with betadine swab for lateral approach injection.  Anesthesia with 1% lidocaine with epi.  Contents of pre-loaded hyaluronic acid syringe injected.  Patient tolerated procedure well.  Identical procedure repeated on right.  Plan: DRAIN/INJECT LARGE JOINT/BURSA, sodium         hyaluronate (EUFLEXXA) injection 20 mg, sodium         hyaluronate (EUFLEXXA) injection 20 mg              RTC in 1m for CPE    Bharath Love MD      Guru Lima is a 61 year old who presents for the following health issues     HPI     Patient here today to have injections done on bilateral knees. 3 of 3 hyaluronic acid injections    Patient would like to discuss getting blood drawn.        Review of Systems         Objective    /78 (BP Location: Right arm, Patient Position: Chair, Cuff Size: Adult Regular)   Pulse 92   Temp 97.9  F (36.6  C) (Oral)   Resp 18   Ht 1.772 m (5' 9.75\")   Wt 97 kg (213 lb 12.8 oz)   SpO2 95%   BMI 30.90 kg/m    Body mass index is 30.9 kg/m .  Physical Exam                   "

## 2021-10-26 NOTE — MR AVS SNAPSHOT
After Visit Summary   10/24/2018    Clarence Tai    MRN: [id number]           Patient Information     Date Of Birth          1960        Visit Information        Provider Department      10/24/2018 4:00 PM Bharath Love MD [facility]        Today's Diagnoses     Erectile dysfunction, unspecified erectile dysfunction type    -  1    Screen for colon cancer        CARDIOVASCULAR SCREENING; LDL GOAL LESS THAN 130        Benign essential hypertension           Follow-ups after your visit        Follow-up notes from your care team     Return in about 1 month (around 11/24/2018) for BP Recheck.      Future tests that were ordered for you today     Open Future Orders        Priority Expected Expires Ordered    TSH with free T4 reflex Routine  11/24/2018 10/24/2018    Comprehensive metabolic panel Routine  11/24/2018 10/24/2018    CBC with platelets Routine  11/24/2018 10/24/2018    Lipid panel reflex to direct LDL Fasting Routine  11/24/2018 10/24/2018    Testosterone Free and Total Routine  11/24/2018 10/24/2018            Who to contact     If you have questions or need follow up information about today's clinic visit or your schedule please contact [facility] directly at [phone].  Normal or non-critical lab and imaging results will be communicated to you by MyChart, letter or phone within 4 business days after the clinic has received the results. If you do not hear from us within 7 days, please contact the clinic through [employer] or phone. If you have a critical or abnormal lab result, we will notify you by phone as soon as possible.  Submit refill requests through [facility] or call your pharmacy and they will forward the refill request to us. Please allow 3 business days for your refill to be completed.          Additional Information About Your Visit        MyChart Information     [facility] [employer] gives you secure access to your electronic health record. If you  Acute Illness Visit note    Impression:   · Encopresis    Plan:   · MiraLax, 17 g in 8 oz of water, 4 oz twice daily.  If stools become too loose this may be decreased in dose.  Also recommend bowel habit retraining.    Follow-up:   Return in about 1 month (around 11/26/2021), or if symptoms worsen or fail to improve, for adolfo constipation.    Order Details:  Orders Placed This Encounter   • polyethylene glycol (MIRALAX) 17 GM/SCOOP powder     Sig: Mix 17 grams in 8 oz water, Crystal Lite, or juice. Give 4 oz by mouth twice a day     Dispense:  476 g     Refill:  5       Associated diagnoses for this encounter:  1. Encopresis with constipation and overflow incontinence       _____________________________________________________________    Chief Complaint   Patient presents with   • Other     frequent stooling/having accidents daily x 1 month- sometimes 10-12 times daily- small sanjiv    .    History provided by:  Mother    HPI: Steph Carbajal is a 6 year old female who presents for evaluation of stool accidents.  The patient has frequent stools and some frequent stool accidents.  This is been going on for 1 month.  Stools are often small and firm, although not hard.  No diarrhea.  No abdominal pain.      The problem list was reviewed and updated as follows:  Patient Active Problem List    Diagnosis Date Noted   • Single liveborn, born in hospital, delivered by vaginal delivery 2015     Priority: Low       ALLERGIES:  No Known Allergies  Medications were reviewed at the time of the encounter.    Physical Exam  Vitals:  Visit Vitals  BP 88/62   Pulse 100   Temp 98.9 °F (37.2 °C)   Ht 3' 5.34\" (1.05 m)   Wt 17.1 kg (37 lb 9.6 oz)   BMI 15.47 kg/m²     · Gen:   The patient is Alert and in no acute distress.  · Hydration: The patient appears well-hydrated  · Eyes:   Conjunctivae clear bilaterally  · Ears:   TMs Normal bilaterally  · Ear canals:  Normal bilaterally  · Oropharynx:  Normal  · Neck:   Supple  · Lungs:    Clear to auscultation   Respiratory effort normal  · Heart:   Regular rate and rhythm without murmur   see a primary care provider, you can also send messages to your care team and make appointments. If you have questions, please call your primary care clinic.  If you do not have a primary care provider, please call [phone] and they will assist you.        Care EveryWhere ID     This is your Care EveryWhere ID. This could be used by other organizations to access your [place] medical records  [id number]        Your Vitals Were     Pulse Temperature Respirations Pulse Oximetry BMI (Body Mass Index)       79 98.3  F (36.8  C) (Oral) 22 96% 29.37 kg/m2        Blood Pressure from Last 3 Encounters:   10/24/18 (!) 152/104   09/25/18 (!) 138/92   10/10/16 116/88    Weight from Last 3 Encounters:   10/24/18 204 lb 11.2 oz (92.9 kg)   09/25/18 203 lb 4.8 oz (92.2 kg)   10/10/16 198 lb (89.8 kg)                 Today's Medication Changes          These changes are accurate as of 10/24/18  4:49 PM.  If you have any questions, ask your nurse or doctor.               Start taking these medicines.        Dose/Directions    lisinopril 10 MG tablet   Commonly known as:  PRINIVIL/ZESTRIL   Used for:  Benign essential hypertension   Started by:  Bharath Love MD        Dose:  10 mg   Take 1 tablet (10 mg) by mouth daily   Quantity:  30 tablet   Refills:  1            Where to get your medicines      These medications were sent to [address] [address]     Phone:  [phone]     lisinopril 10 MG tablet                Primary Care Provider Office Phone # Fax #    Bharath Love [phone]       [address]  [facility] [address]        Equal Access to Services     RAMON BENAVIDEZ AH: Karina Thomas, wagina rodriguez, qaybta kaalmadarío olivas, clyde wylie. So [facility] [phone].    ATENCIÓN: Si habla español, tiene a diana disposición servicios gratuitos de asistencia lingüística.  Deepthi covarrubias [phone].    We comply with applicable federal civil rights laws and [place] laws. We do not discriminate on the basis of race, color, national origin, age, disability, sex, sexual orientation, or gender identity.            Thank you!     Thank you for choosing [facility]  for your care. Our goal is always to provide you with excellent care. Hearing back from our patients is one way we can continue to improve our services. Please take a few minutes to complete the written survey that you may receive in the mail after your visit with us. Thank you!             Your Updated Medication List - Protect others around you: Learn how to safely use, store and throw away your medicines at www.disposemymeds.org.          This list is accurate as of 10/24/18  4:49 PM.  Always use your most recent med list.                   Brand Name Dispense Instructions for use Diagnosis    lisinopril 10 MG tablet    PRINIVIL/ZESTRIL    30 tablet    Take 1 tablet (10 mg) by mouth daily    Benign essential hypertension

## 2021-10-31 ENCOUNTER — HEALTH MAINTENANCE LETTER (OUTPATIENT)
Age: 61
End: 2021-10-31

## 2021-11-08 ENCOUNTER — DOCUMENTATION ONLY (OUTPATIENT)
Dept: LAB | Facility: CLINIC | Age: 61
End: 2021-11-08
Payer: COMMERCIAL

## 2021-11-08 ENCOUNTER — TELEPHONE (OUTPATIENT)
Dept: FAMILY MEDICINE | Facility: CLINIC | Age: 61
End: 2021-11-08
Payer: COMMERCIAL

## 2021-11-08 DIAGNOSIS — Z13.6 CARDIOVASCULAR SCREENING; LDL GOAL LESS THAN 130: Primary | ICD-10-CM

## 2021-11-08 DIAGNOSIS — I10 BENIGN ESSENTIAL HYPERTENSION: ICD-10-CM

## 2021-11-08 NOTE — TELEPHONE ENCOUNTER
Pt wife is calling stating that Clarence needs labs to have a biometric form filled out. Please place appropriate labs prior to his PX on 12/7/2021.     Melia Cortes-

## 2021-11-08 NOTE — PROGRESS NOTES
Clarence Tai has an upcoming lab appointment:    Future Appointments   Date Time Provider Department Center   11/18/2021  3:30 PM [facility]   12/7/2021  3:40 PM Bharath Love MD [facility]     Patient is scheduled for the following lab(s): fasting lab    There is no order available. Please review and place either future orders or HMPO (Review of Health Maintenance Protocol Orders), as appropriate.    Health Maintenance Due   Topic     BMP      ANNUAL REVIEW OF HM ORDERS      Maurice Warner MLT

## 2021-11-18 ENCOUNTER — LAB (OUTPATIENT)
Dept: LAB | Facility: CLINIC | Age: 61
End: 2021-11-18
Payer: COMMERCIAL

## 2021-11-18 DIAGNOSIS — Z13.6 CARDIOVASCULAR SCREENING; LDL GOAL LESS THAN 130: ICD-10-CM

## 2021-11-18 DIAGNOSIS — I10 BENIGN ESSENTIAL HYPERTENSION: ICD-10-CM

## 2021-11-18 PROCEDURE — 80048 BASIC METABOLIC PNL TOTAL CA: CPT

## 2021-11-18 PROCEDURE — 36415 COLL VENOUS BLD VENIPUNCTURE: CPT

## 2021-11-18 PROCEDURE — 80061 LIPID PANEL: CPT

## 2021-11-19 LAB
ANION GAP SERPL CALCULATED.3IONS-SCNC: 5 MMOL/L (ref 3–14)
BUN SERPL-MCNC: 16 MG/DL (ref 7–30)
CALCIUM SERPL-MCNC: 9.1 MG/DL (ref 8.5–10.1)
CHLORIDE BLD-SCNC: 106 MMOL/L (ref 94–109)
CHOLEST SERPL-MCNC: 185 MG/DL
CO2 SERPL-SCNC: 28 MMOL/L (ref 20–32)
CREAT SERPL-MCNC: 1.03 MG/DL (ref 0.66–1.25)
FASTING STATUS PATIENT QL REPORTED: NO
GFR SERPL CREATININE-BSD FRML MDRD: 79 ML/MIN/1.73M2
GLUCOSE BLD-MCNC: 103 MG/DL (ref 70–99)
HDLC SERPL-MCNC: 37 MG/DL
LDLC SERPL CALC-MCNC: 124 MG/DL
NONHDLC SERPL-MCNC: 148 MG/DL
POTASSIUM BLD-SCNC: 4 MMOL/L (ref 3.4–5.3)
SODIUM SERPL-SCNC: 139 MMOL/L (ref 133–144)
TRIGL SERPL-MCNC: 118 MG/DL

## 2021-12-07 ENCOUNTER — TELEPHONE (OUTPATIENT)
Dept: FAMILY MEDICINE | Facility: CLINIC | Age: 61
End: 2021-12-07

## 2021-12-07 ENCOUNTER — OFFICE VISIT (OUTPATIENT)
Dept: FAMILY MEDICINE | Facility: CLINIC | Age: 61
End: 2021-12-07
Payer: COMMERCIAL

## 2021-12-07 VITALS
WEIGHT: 210 LBS | RESPIRATION RATE: 18 BRPM | DIASTOLIC BLOOD PRESSURE: 70 MMHG | HEART RATE: 98 BPM | SYSTOLIC BLOOD PRESSURE: 104 MMHG | TEMPERATURE: 97.9 F | BODY MASS INDEX: 30.06 KG/M2 | OXYGEN SATURATION: 96 % | HEIGHT: 70 IN

## 2021-12-07 DIAGNOSIS — Z23 HIGH PRIORITY FOR 2019-NCOV VACCINE: ICD-10-CM

## 2021-12-07 DIAGNOSIS — M17.0 PRIMARY OSTEOARTHRITIS OF BOTH KNEES: Primary | ICD-10-CM

## 2021-12-07 DIAGNOSIS — I10 BENIGN ESSENTIAL HYPERTENSION: ICD-10-CM

## 2021-12-07 PROCEDURE — 0064A COVID-19,PF,MODERNA (18+ YRS BOOSTER .25ML): CPT | Performed by: FAMILY MEDICINE

## 2021-12-07 PROCEDURE — 99396 PREV VISIT EST AGE 40-64: CPT | Performed by: FAMILY MEDICINE

## 2021-12-07 PROCEDURE — 91306 COVID-19,PF,MODERNA (18+ YRS BOOSTER .25ML): CPT | Performed by: FAMILY MEDICINE

## 2021-12-07 RX ORDER — LISINOPRIL 10 MG/1
10 TABLET ORAL DAILY
Qty: 90 TABLET | Refills: 1 | Status: SHIPPED | OUTPATIENT
Start: 2021-12-07 | End: 2022-06-07

## 2021-12-07 ASSESSMENT — ENCOUNTER SYMPTOMS
ARTHRALGIAS: 1
PALPITATIONS: 0
HEADACHES: 0
CONSTITUTIONAL NEGATIVE: 1
GASTROINTESTINAL NEGATIVE: 1
SHORTNESS OF BREATH: 0

## 2021-12-07 ASSESSMENT — MIFFLIN-ST. JEOR: SCORE: 1769.83

## 2021-12-07 ASSESSMENT — PAIN SCALES - GENERAL: PAINLEVEL: MODERATE PAIN (4)

## 2021-12-07 NOTE — TELEPHONE ENCOUNTER
Reason for call:  Form   Our goal is to have forms completed within 72 hours, however some forms may require a visit or additional information.     Who is the form from? Patient  Where did the form come from? Patient or family brought in     What clinic location was the form placed at? [place]  Where was the form placed? Box/Folder  What number is listed as a contact on the form? [phone]    Phone call message - patient request for a letter, form or note:     Date needed: as soon as possible  Please fax to [phone]  Has the patient signed a consent form for release of information? YES    Additional comments:     Type of letter, form or note: medical    Phone number to reach patient:  Cell number on file:    Telephone Information:   Mobile [phone]       Best Time:  Any     Can we leave a detailed message on this number?  yes    Travel screening: Not Applicable

## 2021-12-07 NOTE — PROGRESS NOTES
SUBJECTIVE:   CC: Clarence Nam Tai is an 61 year old male who presents for preventative health visit.       Patient has been advised of split billing requirements and indicates understanding: Yes  Healthy Habits:     Getting at least 3 servings of Calcium per day:  Yes    Bi-annual eye exam:  Yes    Dental care twice a year:  Yes    Sleep apnea or symptoms of sleep apnea:  None    Diet:  Regular (no restrictions)    Frequency of exercise:  None    Taking medications regularly:  Yes    Medication side effects:  None    PHQ-2 Total Score: 0    Additional concerns today:  No    hyaluronic acid injections not terribly helpful, still having knee pain with activity.    Otherwise feeling well.            Today's PHQ-2 Score:   PHQ-2 ( 1999 Pfizer) 12/7/2021   Q1: Little interest or pleasure in doing things 0   Q2: Feeling down, depressed or hopeless 0   PHQ-2 Score 0   PHQ-2 Total Score (12-17 Years)- Positive if 3 or more points; Administer PHQ-A if positive -   Q1: Little interest or pleasure in doing things Not at all   Q2: Feeling down, depressed or hopeless Not at all   PHQ-2 Score 0       Abuse: Current or Past(Physical, Sexual or Emotional)- No  Do you feel safe in your environment? Yes        Social History     Tobacco Use     Smoking status: Passive Smoke Exposure - Never Smoker     Smokeless tobacco: Never Used     Tobacco comment: passive in home   Substance Use Topics     Alcohol use: Yes     Alcohol/week: 0.0 standard drinks     Comment: 1 per week     If you drink alcohol do you typically have >3 drinks per day or >7 drinks per week? No    Alcohol Use 12/7/2021   Prescreen: >3 drinks/day or >7 drinks/week? No   Prescreen: >3 drinks/day or >7 drinks/week? -       Last PSA: No results found for: PSA    Reviewed orders with patient. Reviewed health maintenance and updated orders accordingly - Yes  Lab work is in process  Labs reviewed in EPIC    Reviewed and updated as needed this visit by clinical staff  Tobacco  " Allergies  Meds             Reviewed and updated as needed this visit by Provider                   Review of Systems   Constitutional: Negative.    HENT: Negative.    Eyes: Negative for visual disturbance.   Respiratory: Negative for shortness of breath.    Cardiovascular: Negative for chest pain, palpitations and peripheral edema.   Gastrointestinal: Negative.    Genitourinary: Negative.    Musculoskeletal: Positive for arthralgias.   Neurological: Negative for headaches.         OBJECTIVE:   /70 (BP Location: Right arm, Patient Position: Sitting, Cuff Size: Adult Large)   Pulse 98   Temp 97.9  F (36.6  C) (Oral)   Resp 18   Ht 1.772 m (5' 9.75\")   Wt 95.3 kg (210 lb)   SpO2 96%   BMI 30.35 kg/m      Physical Exam  Constitutional:       General: He is not in acute distress.     Appearance: He is well-developed.   HENT:      Right Ear: Tympanic membrane and external ear normal.      Left Ear: Tympanic membrane and external ear normal.      Nose: Nose normal.      Mouth/Throat:      Mouth: No oral lesions.      Pharynx: No oropharyngeal exudate.   Eyes:      General:         Right eye: No discharge.         Left eye: No discharge.      Conjunctiva/sclera: Conjunctivae normal.      Pupils: Pupils are equal, round, and reactive to light.   Neck:      Thyroid: No thyromegaly.      Trachea: No tracheal deviation.   Cardiovascular:      Rate and Rhythm: Normal rate and regular rhythm.      Pulses: Normal pulses.      Heart sounds: Normal heart sounds, S1 normal and S2 normal. No murmur heard.  No S3 or S4 sounds.    Pulmonary:      Effort: Pulmonary effort is normal. No respiratory distress.      Breath sounds: Normal breath sounds. No wheezing or rales.   Abdominal:      General: Bowel sounds are normal.      Palpations: Abdomen is soft. There is no mass.      Tenderness: There is no abdominal tenderness.   Musculoskeletal:         General: No deformity. Normal range of motion.      Cervical back: Neck " "supple.   Lymphadenopathy:      Cervical: No cervical adenopathy.   Skin:     General: Skin is warm and dry.      Findings: No lesion or rash.   Neurological:      Mental Status: He is alert and oriented to person, place, and time.      Motor: No abnormal muscle tone.      Deep Tendon Reflexes: Reflexes are normal and symmetric.   Psychiatric:         Speech: Speech normal.         Thought Content: Thought content normal.         Judgment: Judgment normal.               ASSESSMENT/PLAN:       ICD-10-CM    1. Primary osteoarthritis of both knees  M17.0 Orthopedic  Referral   2. Benign essential hypertension  I10 lisinopril (ZESTRIL) 10 MG tablet   3. High priority for 2019-nCoV vaccine  Z23 COVID-19,PF,MODERNA (18+ Yrs BOOSTER .25mL)       Patient has been advised of split billing requirements and indicates understanding: Yes  COUNSELING:   Reviewed preventive health counseling, as reflected in patient instructions    Estimated body mass index is 30.35 kg/m  as calculated from the following:    Height as of this encounter: 1.772 m (5' 9.75\").    Weight as of this encounter: 95.3 kg (210 lb).     Weight management plan: Discussed healthy diet and exercise guidelines    He reports that he is a non-smoker but has been exposed to tobacco smoke. He has never used smokeless tobacco.      Counseling Resources:  ATP IV Guidelines  Pooled Cohorts Equation Calculator  FRAX Risk Assessment  ICSI Preventive Guidelines  Dietary Guidelines for Americans, 2010  USDA's MyPlate  ASA Prophylaxis  Lung CA Screening    Bharath Love MD  [facility]  "

## 2021-12-09 NOTE — TELEPHONE ENCOUNTER
Form placed in provider's box.    Leena URIBE  [facility]   [facility]

## 2021-12-21 NOTE — TELEPHONE ENCOUNTER
Spoke to Charmaine, spouse. Advise Clarence needs to sign and and then we can fax, will be at the [facility].

## 2022-01-15 DIAGNOSIS — R05.9 COUGH: ICD-10-CM

## 2022-01-17 ENCOUNTER — TELEPHONE (OUTPATIENT)
Dept: FAMILY MEDICINE | Facility: CLINIC | Age: 62
End: 2022-01-17
Payer: COMMERCIAL

## 2022-01-17 RX ORDER — OMEPRAZOLE 40 MG/1
CAPSULE, DELAYED RELEASE ORAL
Qty: 90 CAPSULE | Refills: 1 | Status: SHIPPED | OUTPATIENT
Start: 2022-01-17 | End: 2022-06-07

## 2022-01-17 NOTE — TELEPHONE ENCOUNTER
Patient Quality Outreach    Patient is due for the following:   Colon Cancer Screening -  Colonoscopy  Immunizations  -  Tdap and Zoster    NEXT STEPS:   Patient has upcoming appointment, these items will be addressed at that time.    Type of outreach:    Sent [employer] message.      Questions for provider review:    None     Derek Shukla MA

## 2022-01-17 NOTE — LETTER
[facility]  [address] [address] [phone]       January 31, 2022    Clarence Nam [place]  [address] [address]    Dear Clarence,    We care about your health and have reviewed your health plan and are making recommendations based on this review, to optimize your health.    You are in particular need of attention regarding:  -Colon Cancer Screening    We are recommending that you:  -schedule a COLONOSCOPY to look for colon cancer (due every 10 years or 5 years in higher risk situations.)        Colon cancer is now the second leading cause of cancer-related deaths in the [place] for both men and women and there are over 130,000 new cases and 50,000 deaths per year from colon cancer.  Colonoscopies can prevent 90-95% of these deaths.  Problem lesions can be removed before they ever become cancer.  This test is not only looking for cancer, but also getting rid of precancerious lesions.    If you are under/uninsured, we recommend you contact the PaymentOne program. PaymentOne is a free colorectal cancer screening program that provides colonoscopies for eligible under/uninsured [place] men and women. If you are interested in receiving a free colonoscopy, please call PaymentOne at [phone] (mention code [employer]Web) to see if you re eligible.      If you do not wish to do a colonoscopy or cannot afford to do one, at this time, there is another option. It is called a FIT test or Fecal Immunochemical Occult Blood Test (take home stool sample kit).  It does not replace the colonoscopy for colorectal cancer screening, but it can detect hidden bleeding in the lower colon.  It does need to be repeated every year and if a positive result is obtained, you would be referred for a colonoscopy.          If you have completed either one of these tests at another facility, please call with the details of when and where the tests were done and if they  were normal or not. Or have the records sent to our clinic so that we can best coordinate your care.      In addition, here is a list of due or overdue Health Maintenance reminders.    Health Maintenance Due   Topic Date Due     Colorectal Cancer Screening  Never done     Zoster (Shingles) Vaccine (1 of 2) Never done     Diptheria Tetanus Pertussis (DTAP/TDAP/TD) Vaccine (2 - Td or Tdap) 06/15/2021     PHQ-2  01/01/2022       To address the above recommendations, we encourage you to contact us at [phone], via [employer] or by contacting Central Scheduling toll free at [phone] 24 hours a day. They will assist you with finding the most convenient time and location.    Thank you for trusting [facility] and we appreciate the opportunity to serve you.  We look forward to supporting your healthcare needs in the future.    Healthy Regards,    Your [facility] Team

## 2022-01-17 NOTE — TELEPHONE ENCOUNTER
Prescription approved per [facility] Refill Protocol.  Omeprazole    Velma CORNELL RN

## 2022-01-31 NOTE — TELEPHONE ENCOUNTER
Patient Quality Outreach    Patient is due for the following:   Colon Cancer Screening -  Colonoscopy  Immunizations  -  Tdap and Zoster    NEXT STEPS:   Patient has upcoming appointment, these items will be addressed at that time.    Type of outreach:    Sent letter.    Next Steps:  Reach out within 90 days via Phone.    Max number of attempts reached: Yes. Will try again in 90 days if patient still on fail list.    Questions for provider review:    None     Derek Shukla MA

## 2022-05-12 ENCOUNTER — OFFICE VISIT (OUTPATIENT)
Dept: FAMILY MEDICINE | Facility: CLINIC | Age: 62
End: 2022-05-12
Payer: COMMERCIAL

## 2022-05-12 VITALS
WEIGHT: 215 LBS | OXYGEN SATURATION: 94 % | SYSTOLIC BLOOD PRESSURE: 100 MMHG | RESPIRATION RATE: 14 BRPM | DIASTOLIC BLOOD PRESSURE: 72 MMHG | HEIGHT: 70 IN | BODY MASS INDEX: 30.78 KG/M2 | HEART RATE: 101 BPM

## 2022-05-12 DIAGNOSIS — J20.9 ACUTE BRONCHITIS, UNSPECIFIED ORGANISM: Primary | ICD-10-CM

## 2022-05-12 PROCEDURE — 99213 OFFICE O/P EST LOW 20 MIN: CPT | Performed by: PHYSICIAN ASSISTANT

## 2022-05-12 RX ORDER — AZITHROMYCIN 250 MG/1
TABLET, FILM COATED ORAL
Qty: 6 TABLET | Refills: 0 | Status: SHIPPED | OUTPATIENT
Start: 2022-05-12 | End: 2022-05-17

## 2022-05-12 RX ORDER — ALBUTEROL SULFATE 90 UG/1
2 AEROSOL, METERED RESPIRATORY (INHALATION) EVERY 6 HOURS
Qty: 18 G | Refills: 0 | Status: SHIPPED | OUTPATIENT
Start: 2022-05-12 | End: 2024-01-12

## 2022-05-12 NOTE — PROGRESS NOTES
"  Assessment & Plan     Acute bronchitis, unspecified organism  Treated with Z-pack due to exam and symptoms. Also given albuterol inhaler. He has used these in the past but his inhaler is quite old now. Discussed cough medications to try including Mucinex or Robitussin DM. If interested can do Tessalon Perles. He will let me know if he would like these (if the OTC options do not work).  - azithromycin (ZITHROMAX) 250 MG tablet; Take 2 tablets (500 mg) by mouth daily for 1 day, THEN 1 tablet (250 mg) daily for 4 days.  - albuterol (PROAIR HFA/PROVENTIL HFA/VENTOLIN HFA) 108 (90 Base) MCG/ACT inhaler; Inhale 2 puffs into the lungs every 6 hours    Prescription drug management      Return as previously scheduled..    JOSHUA Rivera [facility] IFRAH Lima is a 62 year old who presents for the following health issues  accompanied by his spouse.    HPI     Acute Illness  Onset/Duration: Tuesday last week, took covid test last Thursday (negative)  Symptoms:  Fever: no  Chills/Sweats: no  Headache (location?): no  Sinus Pressure: YES  Conjunctivitis:  no  Ear Pain: no  Rhinorrhea: YES  Congestion: YES  Sore Throat: YES  Cough: YES-non-productive or productive of a light brown color (he notes some shortness of breath, no more than usual)  Wheeze: no  Decreased Appetite: no  Nausea: no  Vomiting: no  Diarrhea: no  Dysuria/Freq.: no  Dysuria or Hematuria: no  Fatigue/Achiness: YES- fatigue  Sick/Strep Exposure: works with a lady in close range who had the same stuff she was diagnosed with bronchitis  Therapies tried and outcome: [facility] type Theraflu and cough drops    Review of Systems   GENERAL:  No fevers  RESP:  As noted in HPI        Objective    /72 (BP Location: Right arm, Patient Position: Sitting, Cuff Size: Adult Regular)   Pulse 101   Resp 14   Ht 1.772 m (5' 9.75\")   Wt 97.5 kg (215 lb)   SpO2 94%   BMI 31.07 kg/m    Body mass index is 31.07 " kg/m .  Physical Exam   GENERAL: No acute distress  HEENT: Normocephalic, PERRL, Canals patent, bilateral TM's non-erythematous and non-bulging. Turbinates normal in appearance bilaterally. Posterior oropharynx non-erythematous and without exudate.  NECK: No cervical or supraclavicular lymphadenopathy.  CARDIAC: Regular rate and rhythm. No murmurs.  PULMONARY: Crackles right middle lobe. Otherwise lungs clear to auscultation.  NEURO: Alert and non-focal

## 2022-05-31 DIAGNOSIS — J20.9 ACUTE BRONCHITIS, UNSPECIFIED ORGANISM: ICD-10-CM

## 2022-06-02 RX ORDER — ALBUTEROL SULFATE 90 UG/1
2 AEROSOL, METERED RESPIRATORY (INHALATION) EVERY 6 HOURS
Qty: 18 G | Refills: 0 | Status: CANCELLED | OUTPATIENT
Start: 2022-06-02

## 2022-06-02 NOTE — TELEPHONE ENCOUNTER
Called patient to see if he had put in refill request for inhaler or if request came from pharmacy. Albuterol inhaler prescribed for acute bronchitis at office visit with Amada Leon PA-C on 5/12/22. Pt informs that he did not make refill request and that symptoms are much improved since office visit. Instructed pt to call us back if symptoms worsen or if he has any other concerns.    Patient was given an opportunity to ask questions, verbalized understanding of plan, and is agreeable.    Yoselin Lomax RN

## 2022-06-07 ENCOUNTER — OFFICE VISIT (OUTPATIENT)
Dept: FAMILY MEDICINE | Facility: CLINIC | Age: 62
End: 2022-06-07
Payer: COMMERCIAL

## 2022-06-07 VITALS
HEART RATE: 75 BPM | OXYGEN SATURATION: 96 % | RESPIRATION RATE: 16 BRPM | WEIGHT: 203.5 LBS | TEMPERATURE: 97.9 F | BODY MASS INDEX: 29.41 KG/M2 | SYSTOLIC BLOOD PRESSURE: 124 MMHG | DIASTOLIC BLOOD PRESSURE: 78 MMHG

## 2022-06-07 DIAGNOSIS — Z23 NEED FOR VACCINATION: ICD-10-CM

## 2022-06-07 DIAGNOSIS — Z12.11 SCREEN FOR COLON CANCER: ICD-10-CM

## 2022-06-07 DIAGNOSIS — M17.0 PRIMARY OSTEOARTHRITIS OF BOTH KNEES: Primary | ICD-10-CM

## 2022-06-07 DIAGNOSIS — R06.83 SNORES: ICD-10-CM

## 2022-06-07 DIAGNOSIS — R05.9 COUGH: ICD-10-CM

## 2022-06-07 DIAGNOSIS — I10 BENIGN ESSENTIAL HYPERTENSION: ICD-10-CM

## 2022-06-07 PROCEDURE — 0064A COVID-19,PF,MODERNA (18+ YRS BOOSTER .25ML): CPT | Performed by: FAMILY MEDICINE

## 2022-06-07 PROCEDURE — 91306 COVID-19,PF,MODERNA (18+ YRS BOOSTER .25ML): CPT | Performed by: FAMILY MEDICINE

## 2022-06-07 PROCEDURE — 90471 IMMUNIZATION ADMIN: CPT | Performed by: FAMILY MEDICINE

## 2022-06-07 PROCEDURE — 99214 OFFICE O/P EST MOD 30 MIN: CPT | Mod: 25 | Performed by: FAMILY MEDICINE

## 2022-06-07 PROCEDURE — 90715 TDAP VACCINE 7 YRS/> IM: CPT | Performed by: FAMILY MEDICINE

## 2022-06-07 RX ORDER — LISINOPRIL 10 MG/1
10 TABLET ORAL DAILY
Qty: 90 TABLET | Refills: 1 | Status: SHIPPED | OUTPATIENT
Start: 2022-06-07 | End: 2022-11-08

## 2022-06-07 RX ORDER — COVID-19 ANTIGEN TEST
220 KIT MISCELLANEOUS 2 TIMES DAILY WITH MEALS
COMMUNITY
End: 2024-01-12

## 2022-06-07 RX ORDER — OMEPRAZOLE 40 MG/1
40 CAPSULE, DELAYED RELEASE ORAL DAILY
Qty: 90 CAPSULE | Refills: 1 | Status: SHIPPED | OUTPATIENT
Start: 2022-06-07 | End: 2022-11-08

## 2022-06-07 ASSESSMENT — ENCOUNTER SYMPTOMS
HEADACHES: 0
ARTHRALGIAS: 1
PALPITATIONS: 0
SHORTNESS OF BREATH: 0
CONSTITUTIONAL NEGATIVE: 1

## 2022-06-07 ASSESSMENT — PAIN SCALES - GENERAL: PAINLEVEL: MODERATE PAIN (5)

## 2022-06-07 NOTE — PROGRESS NOTES
Assessment and Plan    (M17.0) Primary osteoarthritis of both knees  (primary encounter diagnosis)  Comment: Has failed PT, hyaluronic acid injection  Plan: Orthopedic  Referral            (Z12.11) Screen for colon cancer  Comment:   Plan: Fecal colorectal cancer screen (FIT)            (I10) Benign essential hypertension  Comment: well controlled, refilling  Plan: lisinopril (ZESTRIL) 10 MG tablet            (R05.9) Cough  Comment: I wonder if there may be some laryngeal dysfuntion as observed cough is short and percussive, but will trial PPI first  Plan: omeprazole (PRILOSEC) 40 MG DR capsule            (R06.83) Snores  Comment:   Plan: Adult Sleep Eval & Management          Referral            (Z23) Need for vaccination  Comment:   Plan: TDAP VACCINE (Adacel, Boostrix),         COVID-19,PF,MODERNA (18+ YRS BOOSTER .25ML)              RTC in  for CPE    Bharath Love MD      Guru Lima is a 62 year old who presents for the following health issues  accompanied by his spouse.    History of Present Illness       Reason for visit:  Knee  checkup    He eats 0-1 servings of fruits and vegetables daily.He consumes 2 sweetened beverage(s) daily.He exercises with enough effort to increase his heart rate 9 or less minutes per day.  He exercises with enough effort to increase his heart rate 3 or less days per week.   He is taking medications regularly.       Hypertension Follow-up      Do you check your blood pressure regularly outside of the clinic? No     Are you following a low salt diet? No    Are your blood pressures ever more than 140 on the top number (systolic) OR more   than 90 on the bottom number (diastolic), for example 140/90? No    Still struggling with knee pain, not exercising much as his knees hurt.  Is not following with ortho.    Has been struggling with a persistent cough.  Has done something of a work up, has largely improved but still has a bit a lingering cough after using  z-pack.  Does still have some coughing mostly when talking.   Is feeling well overall.  Does use PPI.    Denies CP, palpitations, edema, dyspnea, HA, vision changes.    Is getting up more to use the bathroom.  Usually gets up once a night to urinate.  Notes he doesn't sleep well, and he and wife sleep in different rooms due to his snoring.        Review of Systems   Constitutional: Negative.    Eyes: Negative for visual disturbance.   Respiratory: Negative for shortness of breath.    Cardiovascular: Negative for chest pain, palpitations and peripheral edema.   Musculoskeletal: Positive for arthralgias.   Neurological: Negative for headaches.            Objective    /78 (BP Location: Right arm, Patient Position: Sitting, Cuff Size: Adult Regular)   Pulse 75   Temp 97.9  F (36.6  C) (Oral)   Resp 16   Wt 92.3 kg (203 lb 8 oz)   SpO2 96%   BMI 29.41 kg/m    Body mass index is 29.41 kg/m .  Physical Exam  Vitals and nursing note reviewed.   Constitutional:       General: He is not in acute distress.     Appearance: He is well-developed.   Cardiovascular:      Rate and Rhythm: Normal rate and regular rhythm.      Heart sounds: Normal heart sounds.   Pulmonary:      Effort: Pulmonary effort is normal.      Breath sounds: Normal breath sounds.   Skin:     General: Skin is warm and dry.   Neurological:      Mental Status: He is alert and oriented to person, place, and time.   Psychiatric:         Judgment: Judgment normal.

## 2022-06-25 ASSESSMENT — KOOS JR
BENDING TO THE FLOOR TO PICK UP OBJECT: MODERATE
GOING UP OR DOWN STAIRS: SEVERE
TWISING OR PIVOTING ON KNEE: SEVERE
STANDING UPRIGHT: MODERATE
HOW SEVERE IS YOUR KNEE STIFFNESS AFTER FIRST WAKING IN MORNING: SEVERE
STRAIGHTENING KNEE FULLY: MODERATE
RISING FROM SITTING: SEVERE
KOOS JR SCORING: 42.28

## 2022-06-28 ENCOUNTER — OFFICE VISIT (OUTPATIENT)
Dept: ORTHOPEDICS | Facility: CLINIC | Age: 62
End: 2022-06-28
Payer: COMMERCIAL

## 2022-06-28 VITALS
SYSTOLIC BLOOD PRESSURE: 112 MMHG | BODY MASS INDEX: 28.43 KG/M2 | HEIGHT: 70 IN | DIASTOLIC BLOOD PRESSURE: 78 MMHG | WEIGHT: 198.6 LBS

## 2022-06-28 DIAGNOSIS — M17.0 PRIMARY OSTEOARTHRITIS OF BOTH KNEES: ICD-10-CM

## 2022-06-28 PROCEDURE — 99207 PR DROP WITH A PROCEDURE: CPT | Performed by: ORTHOPAEDIC SURGERY

## 2022-06-28 PROCEDURE — 20610 DRAIN/INJ JOINT/BURSA W/O US: CPT | Mod: 50 | Performed by: ORTHOPAEDIC SURGERY

## 2022-06-28 RX ADMIN — BUPIVACAINE HYDROCHLORIDE 3 ML: 2.5 INJECTION, SOLUTION INFILTRATION; PERINEURAL at 16:46

## 2022-06-28 RX ADMIN — TRIAMCINOLONE ACETONIDE 40 MG: 40 INJECTION, SUSPENSION INTRA-ARTICULAR; INTRAMUSCULAR at 16:46

## 2022-06-28 RX ADMIN — LIDOCAINE HYDROCHLORIDE 3 ML: 10 INJECTION, SOLUTION INFILTRATION; PERINEURAL at 16:46

## 2022-06-28 NOTE — PATIENT INSTRUCTIONS
Thank you for choosing [facility]    Dr. Reddy Locations:    [facility] - [address], [address]  [place] [address] [address] [place] [address]   Appointments: [phone] Appointments: [phone]   Fax: [phone] Fax: [phone]       Follow up: 6 weeks, at least 2 weeks after completion of lab work.   Please call [phone] to schedule your follow up appointment.     Lab orders have been placed evaluating for inflammatory and arthritic markers. You can go to your [facility] location that has a lab. Please call your [facility] to schedule a lab appointment. [facility] has a walk-in hospital lab for your convenience.     Steroid injection of the bilateral knee joints were performed today in clinic    - Would not soak in a hot tub, bath or swimming pool for 48 hours  - Ok to shower  - Ice today and only do your normal amounts of activity  - The lidocaine (what is giving you pain relief right now) will likely stop working in 1-2 hours.  You will then have pain again, similar to before you received the injection. The corticosteroid will not start working until approximately 1-2 weeks from now.  In a small percentage of people, cortisone can cause flushing/redness in the face. This usually lasts for 1-3 days and resolves. Cool compress and Ibuprofen/Tylenol can help if this happens.

## 2022-06-28 NOTE — PROGRESS NOTES
S: Patient is a 62 year old male seen today in consultation for bilateral knee pain.  They report onset of symptoms 3 years ago, no injury.   Pain is located to bilateral knees, patient unable to differentiate specific location of pain.  Increased pain with ambulating stairs, more severe pain with carrying items up stairs.  Pain prolonged sitting and transitioning to standing, patient reports pain improves after first few steps. Pain also with prolonged standing.  Pain wakes at nighttime.  They have tried the following therapies: Physical Therapy x6 weeks, Visco supplementation (3 shot series) 10/2021, Aleve for severe pain.     Current pain level: 3/10, Worst pain level: 7/10.     Patient is currently working desk work..            Patient Active Problem List   Diagnosis     Lumbago     CARDIOVASCULAR SCREENING; LDL GOAL LESS THAN 130     Superficial phlebitis of right leg     Varicose veins of legs     Nephrolithiasis     Benign essential hypertension     Primary osteoarthritis of both knees            Past Medical History:   Diagnosis Date     Nephrolithiasis      Plantar fascial fibromatosis             Past Surgical History:   Procedure Laterality Date     CARPAL TUNNEL RELEASE RT/LT Left 03/05/2020     HC LAP,INGUINAL HERNIA REPR,INITIAL      x2, each side     SURGICAL HISTORY OF -       Right elbow screw due to fracture     ZZC APPENDECTOMY  1972            Social History     Tobacco Use     Smoking status: Passive Smoke Exposure - Never Smoker     Smokeless tobacco: Never Used     Tobacco comment: passive in home   Substance Use Topics     Alcohol use: Yes     Alcohol/week: 0.0 standard drinks     Comment: 1 per week            Family History   Adopted: Yes   Problem Relation Age of Onset     Unknown/Adopted Mother      Unknown/Adopted Father                Allergies   Allergen Reactions     Nkda [No Known Drug Allergies]             Current Outpatient Medications   Medication Sig Dispense Refill      albuterol (PROAIR HFA/PROVENTIL HFA/VENTOLIN HFA) 108 (90 Base) MCG/ACT inhaler Inhale 2 puffs into the lungs every 6 hours 18 g 0     lisinopril (ZESTRIL) 10 MG tablet Take 1 tablet (10 mg) by mouth daily 90 tablet 1     naproxen sodium 220 MG capsule Take 220 mg by mouth 2 times daily (with meals)       omeprazole (PRILOSEC) 40 MG DR capsule Take 1 capsule (40 mg) by mouth daily 90 capsule 1     sildenafil (VIAGRA) 100 MG tablet Take 1 tablet (100 mg) by mouth daily as needed 30 min to 4 hrs before sex. Do not use with nitroglycerin, terazosin or doxazosin. 6 tablet 1          Review Of Systems  Skin: negative  Eyes: negative  Ears/Nose/Throat: negative  Respiratory: No shortness of breath, dyspnea on exertion, cough, or hemoptysis    O: Physical exam:  Pain to palpation medial joint line each knee.  CMS intact each lower extremity. Varus along the longitudinal axis.       Lab: update inflammatory markers and arthritic profile    Images:   IMPRESSION: Left knee medial compartment severe joint space narrowing,  best appreciated on the lateral view. Otherwise unremarkable  Radiographs.   IMPRESSION: Medial compartment osteoarthritis with at least moderate  narrowing. This could be further evaluated with bilateral Kruse  view if indicated clinically. There is a 6 mm calcific density at  Hoffa's fat pad which may well represent an articular body. No  fracture identified.     A:  OA both knees    P:  Inject each knee joint after verbal and written consent, ethyl chloride and chloroprep.  See back after lab analysis.        Large Joint Injection/Arthocentesis: bilateral knee    Date/Time: 6/28/2022 4:46 PM  Performed by: Elia Reddy MD  Authorized by: Elia Reddy MD     Indications:  Pain  Needle Size:  22 G  Guidance: landmark guided    Approach:  Superolateral  Location:  Knee  Laterality:  Bilateral      Medications (Right):  40 mg triamcinolone 40 MG/ML; 3 mL bupivacaine 0.25 %; 3 mL lidocaine 1  %  Medications (Left):  40 mg triamcinolone 40 MG/ML; 3 mL bupivacaine 0.25 %; 3 mL lidocaine 1 %  Outcome:  Tolerated well, no immediate complications  Procedure discussed: discussed risks, benefits, and alternatives    Consent Given by:  Patient  Timeout: timeout called immediately prior to procedure               In addition to the above assessment and plan each active problem on Clarence's problem list was evaluated today. This included the questioning of Clarence for any medication problems. We will continue the current treatment plan for these active problems except as noted.

## 2022-06-28 NOTE — LETTER
6/28/2022         RE: Clarence Tai  [address] [address] [address]        Dear Colleague,    Thank you for referring your patient, Clarence Tai, to the [facility] [place]. Please see a copy of my visit note below.    S: Patient is a 62 year old male seen today in consultation for bilateral knee pain.  They report onset of symptoms 3 years ago, no injury.   Pain is located to bilateral knees, patient unable to differentiate specific location of pain.  Increased pain with ambulating stairs, more severe pain with carrying items up stairs.  Pain prolonged sitting and transitioning to standing, patient reports pain improves after first few steps. Pain also with prolonged standing.  Pain wakes at nighttime.  They have tried the following therapies: Physical Therapy x6 weeks, Visco supplementation (3 shot series) 10/2021, Aleve for severe pain.     Current pain level: 3/10, Worst pain level: 7/10.     Patient is currently working desk work..            Patient Active Problem List   Diagnosis     Lumbago     CARDIOVASCULAR SCREENING; LDL GOAL LESS THAN 130     Superficial phlebitis of right leg     Varicose veins of legs     Nephrolithiasis     Benign essential hypertension     Primary osteoarthritis of both knees            Past Medical History:   Diagnosis Date     Nephrolithiasis      Plantar fascial fibromatosis             Past Surgical History:   Procedure Laterality Date     CARPAL TUNNEL RELEASE RT/LT Left 03/05/2020     HC LAP,INGUINAL HERNIA REPR,INITIAL      x2, each side     SURGICAL HISTORY OF -       Right elbow screw due to fracture     ZZC APPENDECTOMY  1972            Social History     Tobacco Use     Smoking status: Passive Smoke Exposure - Never Smoker     Smokeless tobacco: Never Used     Tobacco comment: passive in home   Substance Use Topics     Alcohol use: Yes     Alcohol/week: 0.0 standard drinks     Comment: 1 per week            Family History    Adopted: Yes   Problem Relation Age of Onset     Unknown/Adopted Mother      Unknown/Adopted Father                Allergies   Allergen Reactions     Nkda [No Known Drug Allergies]             Current Outpatient Medications   Medication Sig Dispense Refill     albuterol (PROAIR HFA/PROVENTIL HFA/VENTOLIN HFA) 108 (90 Base) MCG/ACT inhaler Inhale 2 puffs into the lungs every 6 hours 18 g 0     lisinopril (ZESTRIL) 10 MG tablet Take 1 tablet (10 mg) by mouth daily 90 tablet 1     naproxen sodium 220 MG capsule Take 220 mg by mouth 2 times daily (with meals)       omeprazole (PRILOSEC) 40 MG DR capsule Take 1 capsule (40 mg) by mouth daily 90 capsule 1     sildenafil (VIAGRA) 100 MG tablet Take 1 tablet (100 mg) by mouth daily as needed 30 min to 4 hrs before sex. Do not use with nitroglycerin, terazosin or doxazosin. 6 tablet 1          Review Of Systems  Skin: negative  Eyes: negative  Ears/Nose/Throat: negative  Respiratory: No shortness of breath, dyspnea on exertion, cough, or hemoptysis    O: Physical exam:  Pain to palpation medial joint line each knee.  CMS intact each lower extremity. Varus along the longitudinal axis.       Lab: update inflammatory markers and arthritic profile    Images:   IMPRESSION: Left knee medial compartment severe joint space narrowing,  best appreciated on the lateral view. Otherwise unremarkable  Radiographs.   IMPRESSION: Medial compartment osteoarthritis with at least moderate  narrowing. This could be further evaluated with bilateral Kruse  view if indicated clinically. There is a 6 mm calcific density at  Hoffa's fat pad which may well represent an articular body. No  fracture identified.     A:  OA both knees    P:  Inject each knee joint after verbal and written consent, ethyl chloride and chloroprep.  See back after lab analysis.        Large Joint Injection/Arthocentesis: bilateral knee    Date/Time: 6/28/2022 4:46 PM  Performed by: Elia Reddy MD  Authorized by:  Jim Foss MD     Indications:  Pain  Needle Size:  22 G  Guidance: landmark guided    Approach:  Superolateral  Location:  Knee  Laterality:  Bilateral      Medications (Right):  40 mg triamcinolone 40 MG/ML; 3 mL bupivacaine 0.25 %; 3 mL lidocaine 1 %  Medications (Left):  40 mg triamcinolone 40 MG/ML; 3 mL bupivacaine 0.25 %; 3 mL lidocaine 1 %  Outcome:  Tolerated well, no immediate complications  Procedure discussed: discussed risks, benefits, and alternatives    Consent Given by:  Patient  Timeout: timeout called immediately prior to procedure               In addition to the above assessment and plan each active problem on Clarence's problem list was evaluated today. This included the questioning of Clarence for any medication problems. We will continue the current treatment plan for these active problems except as noted.      Again, thank you for allowing me to participate in the care of your patient.        Sincerely,        JIM FOSS MD

## 2022-07-07 RX ORDER — BUPIVACAINE HYDROCHLORIDE 2.5 MG/ML
3 INJECTION, SOLUTION INFILTRATION; PERINEURAL
Status: DISCONTINUED | OUTPATIENT
Start: 2022-06-28 | End: 2022-08-09

## 2022-07-07 RX ORDER — TRIAMCINOLONE ACETONIDE 40 MG/ML
40 INJECTION, SUSPENSION INTRA-ARTICULAR; INTRAMUSCULAR
Status: DISCONTINUED | OUTPATIENT
Start: 2022-06-28 | End: 2022-08-09

## 2022-07-07 RX ORDER — LIDOCAINE HYDROCHLORIDE 10 MG/ML
3 INJECTION, SOLUTION INFILTRATION; PERINEURAL
Status: DISCONTINUED | OUTPATIENT
Start: 2022-06-28 | End: 2022-08-09

## 2022-07-15 ENCOUNTER — OFFICE VISIT (OUTPATIENT)
Dept: FAMILY MEDICINE | Facility: CLINIC | Age: 62
End: 2022-07-15
Payer: COMMERCIAL

## 2022-07-15 VITALS
HEART RATE: 96 BPM | RESPIRATION RATE: 15 BRPM | TEMPERATURE: 97.7 F | SYSTOLIC BLOOD PRESSURE: 115 MMHG | BODY MASS INDEX: 27.81 KG/M2 | DIASTOLIC BLOOD PRESSURE: 70 MMHG | WEIGHT: 187.8 LBS | HEIGHT: 69 IN | OXYGEN SATURATION: 98 %

## 2022-07-15 DIAGNOSIS — R63.1 POLYDIPSIA: ICD-10-CM

## 2022-07-15 DIAGNOSIS — E11.65 TYPE 2 DIABETES MELLITUS WITH HYPERGLYCEMIA, WITHOUT LONG-TERM CURRENT USE OF INSULIN (H): Primary | ICD-10-CM

## 2022-07-15 DIAGNOSIS — R63.4 WEIGHT LOSS: ICD-10-CM

## 2022-07-15 LAB
ERYTHROCYTE [DISTWIDTH] IN BLOOD BY AUTOMATED COUNT: 11.6 % (ref 10–15)
HBA1C MFR BLD: 14.8 % (ref 0–5.6)
HCT VFR BLD AUTO: 48.3 % (ref 40–53)
HGB BLD-MCNC: 16.9 G/DL (ref 13.3–17.7)
MCH RBC QN AUTO: 30.3 PG (ref 26.5–33)
MCHC RBC AUTO-ENTMCNC: 35 G/DL (ref 31.5–36.5)
MCV RBC AUTO: 87 FL (ref 78–100)
PLATELET # BLD AUTO: 245 10E3/UL (ref 150–450)
RBC # BLD AUTO: 5.58 10E6/UL (ref 4.4–5.9)
WBC # BLD AUTO: 10.1 10E3/UL (ref 4–11)

## 2022-07-15 PROCEDURE — 85027 COMPLETE CBC AUTOMATED: CPT | Performed by: FAMILY MEDICINE

## 2022-07-15 PROCEDURE — 83036 HEMOGLOBIN GLYCOSYLATED A1C: CPT | Performed by: FAMILY MEDICINE

## 2022-07-15 PROCEDURE — 99214 OFFICE O/P EST MOD 30 MIN: CPT | Performed by: FAMILY MEDICINE

## 2022-07-15 PROCEDURE — 36415 COLL VENOUS BLD VENIPUNCTURE: CPT | Performed by: FAMILY MEDICINE

## 2022-07-15 PROCEDURE — 84443 ASSAY THYROID STIM HORMONE: CPT | Performed by: FAMILY MEDICINE

## 2022-07-15 PROCEDURE — 80048 BASIC METABOLIC PNL TOTAL CA: CPT | Performed by: FAMILY MEDICINE

## 2022-07-15 RX ORDER — LANCETS
EACH MISCELLANEOUS
Qty: 50 EACH | Refills: 6 | Status: SHIPPED | OUTPATIENT
Start: 2022-07-15 | End: 2024-01-12

## 2022-07-15 RX ORDER — METFORMIN HCL 500 MG
1000 TABLET, EXTENDED RELEASE 24 HR ORAL 2 TIMES DAILY WITH MEALS
Qty: 360 TABLET | Refills: 1 | Status: SHIPPED | OUTPATIENT
Start: 2022-07-15 | End: 2022-11-08

## 2022-07-15 RX ORDER — INSULIN GLARGINE-YFGN 100 [IU]/ML
16 INJECTION, SOLUTION SUBCUTANEOUS AT BEDTIME
Qty: 15 ML | Refills: 1 | Status: SHIPPED | OUTPATIENT
Start: 2022-07-15 | End: 2022-07-29

## 2022-07-15 RX ORDER — GLUCOSAMINE HCL/CHONDROITIN SU 500-400 MG
CAPSULE ORAL
Qty: 100 EACH | Refills: 3 | Status: SHIPPED | OUTPATIENT
Start: 2022-07-15 | End: 2024-01-12

## 2022-07-15 ASSESSMENT — ENCOUNTER SYMPTOMS
UNEXPECTED WEIGHT CHANGE: 1
GASTROINTESTINAL NEGATIVE: 1
RESPIRATORY NEGATIVE: 1
NEUROLOGICAL NEGATIVE: 1
POLYDIPSIA: 1

## 2022-07-15 ASSESSMENT — PAIN SCALES - GENERAL: PAINLEVEL: NO PAIN (0)

## 2022-07-15 NOTE — PROGRESS NOTES
Assessment and Plan    (E11.65) Type 2 diabetes mellitus with hyperglycemia, without long-term current use of insulin (H)  (primary encounter diagnosis)  Comment: A1c 14+ on presentation.  Will start on LA insulin + metformin, f/u 1m.  Wife has had DM for many years, so will be a good resource, will still arrange for DM ed.  Plan: Insulin Glargine-yfgn (SEMGLEE, YFGN,) 100         UNIT/ML SOPN, metFORMIN (GLUCOPHAGE XR) 500 MG         24 hr tablet, blood glucose monitoring (NO         BRAND SPECIFIED) meter device kit, blood         glucose (NO BRAND SPECIFIED) test strip, blood         glucose calibration (NO BRAND SPECIFIED)         solution, thin (NO BRAND SPECIFIED) lancets,         alcohol swab prep pads, AMB Adult Diabetes         Educator Referral              (R63.1) Polydipsia  Comment:   Plan: Basic metabolic panel  (Ca, Cl, CO2, Creat,         Gluc, K, Na, BUN), Hemoglobin A1c, Glucose         Whole Blood POCT, Enter/Edit Result, TSH with         free T4 reflex            (R63.4) Weight loss  Comment:   Plan: TSH with free T4 reflex, CBC with platelets              RTC in 1m    Bharath Love MD      Guru Lima is a 62 year old, presenting for the following health issues:  Consult      History of Present Illness       Reason for visit:  Not sure.    Weight loss    He eats 0-1 servings of fruits and vegetables daily.He consumes 3 sweetened beverage(s) daily.He exercises with enough effort to increase his heart rate 9 or less minutes per day.  He exercises with enough effort to increase his heart rate 3 or less days per week.   He is taking medications regularly.       Concern - Possible diabetes?  Onset: Noticed these symptoms before his trip over July 4th- 2 weeks ago    Description: Losing weight. Recently weighed himself -188 at home.   Feeling thirsty a lot and then proceeds to go to the bathroom a lot.   Fatigue easily   Eating something dry it ends up feeling pasty. Loss of taste,things  "don't taste right (tested negative for COVID)  Lacking color according to his wife  Irritable     Noting persistently dry mouth, loosing weight.  Notes he lost sense of taste after recent COVID infection.  Is adopted, family history unknown.    Did cortisone shots in his knees about 2w ago.    Review of Systems   Constitutional: Positive for unexpected weight change.   Respiratory: Negative.    Gastrointestinal: Negative.    Endocrine: Positive for polydipsia and polyuria.   Neurological: Negative.             Objective    /70 (BP Location: Right arm, Patient Position: Sitting, Cuff Size: Adult Regular)   Pulse 96   Temp 97.7  F (36.5  C) (Oral)   Resp 15   Ht 1.753 m (5' 9\")   Wt 85.2 kg (187 lb 12.8 oz)   SpO2 98%   BMI 27.73 kg/m    Body mass index is 27.73 kg/m .  Physical Exam  Vitals and nursing note reviewed.   Constitutional:       Appearance: Normal appearance.   Skin:     General: Skin is warm and dry.   Neurological:      General: No focal deficit present.      Mental Status: He is alert and oriented to person, place, and time.   Psychiatric:         Mood and Affect: Mood normal.         Behavior: Behavior normal.            .  ..  "

## 2022-07-16 ENCOUNTER — NURSE TRIAGE (OUTPATIENT)
Dept: NURSING | Facility: CLINIC | Age: 62
End: 2022-07-16

## 2022-07-16 ENCOUNTER — HOSPITAL ENCOUNTER (EMERGENCY)
Facility: CLINIC | Age: 62
Discharge: HOME | End: 2022-07-16
Payer: COMMERCIAL

## 2022-07-16 VITALS
DIASTOLIC BLOOD PRESSURE: 95 MMHG | OXYGEN SATURATION: 96 % | HEART RATE: 89 BPM | SYSTOLIC BLOOD PRESSURE: 130 MMHG | RESPIRATION RATE: 18 BRPM | HEIGHT: 71 IN | WEIGHT: 188 LBS | BODY MASS INDEX: 26.32 KG/M2 | TEMPERATURE: 97.2 F

## 2022-07-16 LAB
ANION GAP SERPL CALCULATED.3IONS-SCNC: 12 MMOL/L (ref 3–14)
BUN SERPL-MCNC: 27 MG/DL (ref 7–30)
CALCIUM SERPL-MCNC: 9.7 MG/DL (ref 8.5–10.1)
CHLORIDE BLD-SCNC: 94 MMOL/L (ref 94–109)
CO2 SERPL-SCNC: 23 MMOL/L (ref 20–32)
CREAT SERPL-MCNC: 0.95 MG/DL (ref 0.66–1.25)
GFR SERPL CREATININE-BSD FRML MDRD: >90 ML/MIN/1.73M2
GLUCOSE BLD-MCNC: 627 MG/DL (ref 70–99)
POTASSIUM BLD-SCNC: 4.8 MMOL/L (ref 3.4–5.3)
SODIUM SERPL-SCNC: 129 MMOL/L (ref 133–144)

## 2022-07-17 LAB
GLUCOSE BLDC GLUCOMTR-MCNC: 400 MG/DL (ref 70–99)
TSH SERPL DL<=0.005 MIU/L-ACNC: 0.48 MU/L (ref 0.4–4)

## 2022-07-17 NOTE — TELEPHONE ENCOUNTER
BS is 627    Dr. Seymour, on-call doctor, is recommending patient be seen in the ED.    7:42 pm - called cell #, no answer, didn't leave VM. Will call Home # next.    7:43 pm -- left  Message on home # for patient to call his clinic.    7:44 pm - left a message on Cell # for patient to call his clinic.    8:25 pm  - left message on Cell # to go to  ED.    8:30 pm -- spoke to daughter, Fely, and informed patient should be seen in the ED per on-call doctor's recommendation d/t high blood sugar.      Reason for Disposition    [1] Follow-up call from patient regarding patient's clinical status AND [2] information urgent    Protocols used: PCP CALL - NO TRIAGE-A-

## 2022-07-17 NOTE — ED TRIAGE NOTES
Pt told to present to ED for elevated BG level in the 600's. Pt states that he was recently diagnosed yesterday with diabetes and prescribed new medications. Pt states that last week he had felt unwell but states that he has now been feeling better. Pt denies any vomiting. BG in triage 400.

## 2022-07-19 ENCOUNTER — TELEPHONE (OUTPATIENT)
Dept: FAMILY MEDICINE | Facility: CLINIC | Age: 62
End: 2022-07-19

## 2022-07-19 NOTE — TELEPHONE ENCOUNTER
Diabetes Education Scheduling Outreach #1:    Call to patient to schedule. Left message with phone number to call to schedule.    Also sent [employer] message for second attempt. Requested patient to call to schedule.    Tresa Cruz OnCall  Diabetes and Nutrition Scheduling

## 2022-07-29 DIAGNOSIS — E11.65 TYPE 2 DIABETES MELLITUS WITH HYPERGLYCEMIA, WITHOUT LONG-TERM CURRENT USE OF INSULIN (H): ICD-10-CM

## 2022-07-29 RX ORDER — INSULIN GLARGINE-YFGN 100 [IU]/ML
16 INJECTION, SOLUTION SUBCUTANEOUS AT BEDTIME
Qty: 15 ML | Refills: 1 | Status: SHIPPED | OUTPATIENT
Start: 2022-07-29 | End: 2024-01-12

## 2022-07-29 NOTE — TELEPHONE ENCOUNTER
Patient's wife requests transfer of Semglee insulin prescription 7/15/2022 from [facility] to [facility] as can get 90 day supply for same co-pay.    Sent current prescription to [facility].    Rosio Alva RN

## 2022-08-02 RX ORDER — BLOOD SUGAR DIAGNOSTIC
STRIP MISCELLANEOUS
Qty: 90 EACH | Refills: 0 | Status: CANCELLED | OUTPATIENT
Start: 2022-08-02

## 2022-08-02 RX ORDER — INSULIN GLARGINE-YFGN 100 [IU]/ML
16 INJECTION, SOLUTION SUBCUTANEOUS AT BEDTIME
Qty: 20 ML | Refills: 1 | Status: SHIPPED | OUTPATIENT
Start: 2022-08-02 | End: 2024-01-12

## 2022-08-02 RX ORDER — SYRINGE-NEEDLE,INSULIN,0.5 ML 27GX1/2"
SYRINGE, EMPTY DISPOSABLE MISCELLANEOUS
Qty: 100 EACH | Refills: 1 | Status: SHIPPED | OUTPATIENT
Start: 2022-08-02 | End: 2022-08-04 | Stop reason: ALTCHOICE

## 2022-08-02 NOTE — TELEPHONE ENCOUNTER
Routing refill request to provider for review/approval because:  Drug not active on patient's medication list    Jesenia Garcia RN on 8/2/2022 at 5:31 PM

## 2022-08-04 NOTE — TELEPHONE ENCOUNTER
Pt wife called to clarify Rx's and meter concerns.   Pt wife noted she went to pharmacy to  Pen needles and syringes with needle were ordered and given.     Pt wife also noted they are switching all diabetic meds and supplies to [facility] as better covered by insurance. Pt wife noted they recently received a new machine through [employer] for an accuchek machine and test strips. Pt went to refill test strips through [facility] but [facility] only covers one touch meter and test strips. Pt noted unsure if a new machine can be given again since recently filled.    Writer advised will send in pen needles for insulin pens. Advised will check with pharmacy about meter and coverage. Will call back when more details known.    Patients wife stated an understanding and agreed with plan.    Writer called pharmacy, spoke to pharmacist, noted the insurance will cover a new machine, test strips, and lancets. Pharmacy also noted the pen needles recently sent was out of stock, noted has 4mm 32G in stock if can send that for fill. Writer noted and orders new supplies.    Jesse RESTREPO RN   [facility]

## 2022-08-04 NOTE — TELEPHONE ENCOUNTER
Pt wife calls back, need rx for PEN NEEDLES, not syringe and needles. Picked up rx for syringes. Please send new rx to    [id number] [place] - [address]    Please send 90 day supply    Melia Wilson

## 2022-08-09 ENCOUNTER — OFFICE VISIT (OUTPATIENT)
Dept: ORTHOPEDICS | Facility: CLINIC | Age: 62
End: 2022-08-09
Payer: COMMERCIAL

## 2022-08-09 VITALS — DIASTOLIC BLOOD PRESSURE: 74 MMHG | SYSTOLIC BLOOD PRESSURE: 114 MMHG

## 2022-08-09 DIAGNOSIS — M17.0 PRIMARY OSTEOARTHRITIS OF BOTH KNEES: Primary | ICD-10-CM

## 2022-08-09 PROCEDURE — 99212 OFFICE O/P EST SF 10 MIN: CPT | Performed by: ORTHOPAEDIC SURGERY

## 2022-08-09 ASSESSMENT — KOOS JR
HOW SEVERE IS YOUR KNEE STIFFNESS AFTER FIRST WAKING IN MORNING: MILD
STANDING UPRIGHT: MILD
GOING UP OR DOWN STAIRS: MODERATE
RISING FROM SITTING: MILD
TWISING OR PIVOTING ON KNEE: MILD
KOOS JR SCORING: 70.7

## 2022-08-09 NOTE — PATIENT INSTRUCTIONS
Thank you for choosing [facility]    Dr. Reddy Locations:    [facility] [address], [address]  [place] [address] [place] [address]   Appointments: [phone] Appointments: [phone]   Fax: [phone] Fax: [phone]       Follow up: 3 months, sooner as needed.   Please call [phone] to schedule your follow up appointment.     Lab orders have been placed evaluating for arthritic and inflammatory markers. You can go to your [facility] location that has a lab. Please call your [facility] to schedule a lab appointment. [facility] has a walk-in hospital lab for your convenience.       For any questions please contact my office, [phone].

## 2022-08-09 NOTE — LETTER
8/9/2022         RE: Clarence Tai  [address] [facility] [address]        Dear Colleague,    Thank you for referring your patient, Clarence Tai, to the [facility] [place]. Please see a copy of my visit note below.    S: Patient is a 62 year old  They have tried the following therapies: bilateral knee intra-articular cortisone injections, Physical Therapy Visco injections, and Aleve    .    Current pain level 75% improved  Patient is currently working 2 more years.   Had marked elevation of blood sugar with injections.  Now on metformin and insulin.  HgbA1C greater than 14.  Doing well now with all.           Patient Active Problem List   Diagnosis     Lumbago     CARDIOVASCULAR SCREENING; LDL GOAL LESS THAN 130     Superficial phlebitis of right leg     Varicose veins of legs     Nephrolithiasis     Benign essential hypertension     Primary osteoarthritis of both knees            Past Medical History:   Diagnosis Date     Nephrolithiasis      Plantar fascial fibromatosis             Past Surgical History:   Procedure Laterality Date     CARPAL TUNNEL RELEASE RT/LT Left 03/05/2020     HC LAP,INGUINAL HERNIA REPR,INITIAL      x2, each side     SURGICAL HISTORY OF -       Right elbow screw due to fracture     ZZC APPENDECTOMY  1972            Social History     Tobacco Use     Smoking status: Passive Smoke Exposure - Never Smoker     Smokeless tobacco: Never Used     Tobacco comment: passive in home   Substance Use Topics     Alcohol use: Yes     Alcohol/week: 0.0 standard drinks     Comment: 1 per week            Family History   Adopted: Yes   Problem Relation Age of Onset     Unknown/Adopted Mother      Unknown/Adopted Father                Allergies   Allergen Reactions     Nkda [No Known Drug Allergies]             Current Outpatient Medications   Medication Sig Dispense Refill     albuterol (PROAIR HFA/PROVENTIL HFA/VENTOLIN HFA) 108 (90 Base) MCG/ACT inhaler  Inhale 2 puffs into the lungs every 6 hours 18 g 0     alcohol swab prep pads Use to swab area of injection/yanira as directed. 100 each 3     blood glucose (NO BRAND SPECIFIED) lancets standard Use to test blood sugar 1 times daily or as directed. 100 lancet 1     blood glucose (NO BRAND SPECIFIED) test strip Use to test blood sugar 1 times daily or as directed. To accompany: Blood Glucose Monitor Brands: per insurance. 100 strip 6     blood glucose calibration (NO BRAND SPECIFIED) solution To accompany: Blood Glucose Monitor Brands: per insurance. 1 each 1     blood glucose monitoring (NO BRAND SPECIFIED) meter device kit Use to test blood sugar 1 times daily or as directed. Preferred blood glucose meter OR supplies to accompany: 1 kit 0     Insulin Glargine-yfgn (SEMGLEE, YFGN,) 100 UNIT/ML SOLN Inject 16 Units Subcutaneous At Bedtime 20 mL 1     Insulin Glargine-yfgn (SEMGLEE, YFGN,) 100 UNIT/ML SOPN Inject 16 Units Subcutaneous At Bedtime 15 mL 1     insulin pen needle (32G X 4 MM) 32G X 4 MM miscellaneous Use 1 pen needles daily or as directed. 100 each 1     lisinopril (ZESTRIL) 10 MG tablet Take 1 tablet (10 mg) by mouth daily 90 tablet 1     metFORMIN (GLUCOPHAGE XR) 500 MG 24 hr tablet Take 2 tablets (1,000 mg) by mouth 2 times daily (with meals) 360 tablet 1     naproxen sodium 220 MG capsule Take 220 mg by mouth 2 times daily (with meals) (Patient not taking: Reported on 7/15/2022)       omeprazole (PRILOSEC) 40 MG DR capsule Take 1 capsule (40 mg) by mouth daily 90 capsule 1     sildenafil (VIAGRA) 100 MG tablet Take 1 tablet (100 mg) by mouth daily as needed 30 min to 4 hrs before sex. Do not use with nitroglycerin, terazosin or doxazosin. 6 tablet 1     thin (NO BRAND SPECIFIED) lancets Use with lanceting device. To accompany: Blood Glucose Monitor Brands: per insurance. 50 each 6          Review Of Systems  Skin: negative  Eyes: negative  Ears/Nose/Throat: negative  Respiratory: No shortness of  breath, dyspnea on exertion, cough, or hemoptysis    O: Physical Exam:  Adequate flexion and extension each knee.  No effusion either knee.  CMS intact to both lower extremities.    Images:  IMPRESSION: Left knee medial compartment severe joint space narrowing,  best appreciated on the lateral view. Otherwise unremarkable  radiographs.                                                                        IMPRESSION: Medial compartment osteoarthritis with at least moderate  narrowing. This could be further evaluated with bilateral Kruse  view if indicated clinically. There is a 6 mm calcific density at  Hoffa's fat pad which may well represent an articular body. No  fracture identified.     A:  Bilateral knee OA improved after injection  Diabetes identified and treated but made worse initially after injection    P:  Continue rehab as able.  Discussed risks and benefits with further injections and patient recalls that previous visco supplementation didn't work, but this time the steroid injection gave him 100% relief and now about 75% relief.    Follow outcomes  Notify if exacerbation symptoms  See back 3-6 mos           In addition to the above assessment and plan each active problem on Clarence's problem list was evaluated today. This included the questioning of Clarence for any medication problems. We will continue the current treatment plan for these active problems except as noted        Again, thank you for allowing me to participate in the care of your patient.        Sincerely,        JIM FOSS MD

## 2022-08-09 NOTE — PROGRESS NOTES
S: Patient is a 62 year old  They have tried the following therapies: bilateral knee intra-articular cortisone injections, Physical Therapy Visco injections, and Aleve    .    Current pain level 75% improved  Patient is currently working 2 more years.   Had marked elevation of blood sugar with injections.  Now on metformin and insulin.  HgbA1C greater than 14.  Doing well now with all.           Patient Active Problem List   Diagnosis     Lumbago     CARDIOVASCULAR SCREENING; LDL GOAL LESS THAN 130     Superficial phlebitis of right leg     Varicose veins of legs     Nephrolithiasis     Benign essential hypertension     Primary osteoarthritis of both knees            Past Medical History:   Diagnosis Date     Nephrolithiasis      Plantar fascial fibromatosis             Past Surgical History:   Procedure Laterality Date     CARPAL TUNNEL RELEASE RT/LT Left 03/05/2020     HC LAP,INGUINAL HERNIA REPR,INITIAL      x2, each side     SURGICAL HISTORY OF -       Right elbow screw due to fracture     ZZC APPENDECTOMY  1972            Social History     Tobacco Use     Smoking status: Passive Smoke Exposure - Never Smoker     Smokeless tobacco: Never Used     Tobacco comment: passive in home   Substance Use Topics     Alcohol use: Yes     Alcohol/week: 0.0 standard drinks     Comment: 1 per week            Family History   Adopted: Yes   Problem Relation Age of Onset     Unknown/Adopted Mother      Unknown/Adopted Father                Allergies   Allergen Reactions     Nkda [No Known Drug Allergies]             Current Outpatient Medications   Medication Sig Dispense Refill     albuterol (PROAIR HFA/PROVENTIL HFA/VENTOLIN HFA) 108 (90 Base) MCG/ACT inhaler Inhale 2 puffs into the lungs every 6 hours 18 g 0     alcohol swab prep pads Use to swab area of injection/yanira as directed. 100 each 3     blood glucose (NO BRAND SPECIFIED) lancets standard Use to test blood sugar 1 times daily or as directed. 100 lancet 1     blood  glucose (NO BRAND SPECIFIED) test strip Use to test blood sugar 1 times daily or as directed. To accompany: Blood Glucose Monitor Brands: per insurance. 100 strip 6     blood glucose calibration (NO BRAND SPECIFIED) solution To accompany: Blood Glucose Monitor Brands: per insurance. 1 each 1     blood glucose monitoring (NO BRAND SPECIFIED) meter device kit Use to test blood sugar 1 times daily or as directed. Preferred blood glucose meter OR supplies to accompany: 1 kit 0     Insulin Glargine-yfgn (SEMGLEE, YFGN,) 100 UNIT/ML SOLN Inject 16 Units Subcutaneous At Bedtime 20 mL 1     Insulin Glargine-yfgn (SEMGLEE, YFGN,) 100 UNIT/ML SOPN Inject 16 Units Subcutaneous At Bedtime 15 mL 1     insulin pen needle (32G X 4 MM) 32G X 4 MM miscellaneous Use 1 pen needles daily or as directed. 100 each 1     lisinopril (ZESTRIL) 10 MG tablet Take 1 tablet (10 mg) by mouth daily 90 tablet 1     metFORMIN (GLUCOPHAGE XR) 500 MG 24 hr tablet Take 2 tablets (1,000 mg) by mouth 2 times daily (with meals) 360 tablet 1     naproxen sodium 220 MG capsule Take 220 mg by mouth 2 times daily (with meals) (Patient not taking: Reported on 7/15/2022)       omeprazole (PRILOSEC) 40 MG DR capsule Take 1 capsule (40 mg) by mouth daily 90 capsule 1     sildenafil (VIAGRA) 100 MG tablet Take 1 tablet (100 mg) by mouth daily as needed 30 min to 4 hrs before sex. Do not use with nitroglycerin, terazosin or doxazosin. 6 tablet 1     thin (NO BRAND SPECIFIED) lancets Use with lanceting device. To accompany: Blood Glucose Monitor Brands: per insurance. 50 each 6          Review Of Systems  Skin: negative  Eyes: negative  Ears/Nose/Throat: negative  Respiratory: No shortness of breath, dyspnea on exertion, cough, or hemoptysis    O: Physical Exam:  Adequate flexion and extension each knee.  No effusion either knee.  CMS intact to both lower extremities.    Images:  IMPRESSION: Left knee medial compartment severe joint space narrowing,  best  appreciated on the lateral view. Otherwise unremarkable  radiographs.                                                                        IMPRESSION: Medial compartment osteoarthritis with at least moderate  narrowing. This could be further evaluated with bilateral Kruse  view if indicated clinically. There is a 6 mm calcific density at  Hoffa's fat pad which may well represent an articular body. No  fracture identified.     A:  Bilateral knee OA improved after injection  Diabetes identified and treated but made worse initially after injection    P:  Continue rehab as able.  Discussed risks and benefits with further injections and patient recalls that previous visco supplementation didn't work, but this time the steroid injection gave him 100% relief and now about 75% relief.    Follow outcomes  Notify if exacerbation symptoms  See back 3-6 mos           In addition to the above assessment and plan each active problem on Clarence's problem list was evaluated today. This included the questioning of Clarence for any medication problems. We will continue the current treatment plan for these active problems except as noted

## 2022-08-11 ENCOUNTER — TELEPHONE (OUTPATIENT)
Dept: FAMILY MEDICINE | Facility: CLINIC | Age: 62
End: 2022-08-11

## 2022-08-15 NOTE — TELEPHONE ENCOUNTER
Rec'd request to change rx from Accu chek lancets to One touch delica lancets.        [id number] - [place], [place] - [address]  VIRY Wilson

## 2022-08-15 NOTE — TELEPHONE ENCOUNTER
Called pharm and adv of 8/4 script for generic lancets for whatever works for his insurance.     Velma CORNELL RN

## 2022-08-23 ENCOUNTER — OFFICE VISIT (OUTPATIENT)
Dept: FAMILY MEDICINE | Facility: CLINIC | Age: 62
End: 2022-08-23
Payer: COMMERCIAL

## 2022-08-23 VITALS
HEART RATE: 72 BPM | RESPIRATION RATE: 15 BRPM | BODY MASS INDEX: 27.98 KG/M2 | OXYGEN SATURATION: 95 % | HEIGHT: 71 IN | WEIGHT: 199.9 LBS | SYSTOLIC BLOOD PRESSURE: 120 MMHG | DIASTOLIC BLOOD PRESSURE: 74 MMHG | TEMPERATURE: 98 F

## 2022-08-23 DIAGNOSIS — E11.65 TYPE 2 DIABETES MELLITUS WITH HYPERGLYCEMIA, WITHOUT LONG-TERM CURRENT USE OF INSULIN (H): ICD-10-CM

## 2022-08-23 LAB — HBA1C MFR BLD: 9.2 % (ref 0–5.6)

## 2022-08-23 PROCEDURE — 36415 COLL VENOUS BLD VENIPUNCTURE: CPT | Performed by: FAMILY MEDICINE

## 2022-08-23 PROCEDURE — 82043 UR ALBUMIN QUANTITATIVE: CPT | Performed by: FAMILY MEDICINE

## 2022-08-23 PROCEDURE — 99214 OFFICE O/P EST MOD 30 MIN: CPT | Performed by: FAMILY MEDICINE

## 2022-08-23 PROCEDURE — 83036 HEMOGLOBIN GLYCOSYLATED A1C: CPT | Performed by: FAMILY MEDICINE

## 2022-08-23 RX ORDER — BLOOD GLUCOSE CONTROL HIGH,LOW
EACH MISCELLANEOUS
COMMUNITY
Start: 2022-07-17 | End: 2024-01-12

## 2022-08-23 RX ORDER — BLOOD-GLUCOSE METER
EACH MISCELLANEOUS
COMMUNITY
Start: 2022-08-04 | End: 2024-01-12

## 2022-08-23 ASSESSMENT — PAIN SCALES - GENERAL: PAINLEVEL: NO PAIN (0)

## 2022-08-23 NOTE — PROGRESS NOTES
Assessment and Plan    (E11.65) Type 2 diabetes mellitus with hyperglycemia, without long-term current use of insulin (H)  Comment: nice improvement after only 1m.  Did review how to reduce LA insulin in FBG <120  Plan: Albumin Random Urine Quantitative with Creat         Ratio, Hemoglobin A1c              RTC in 2m    Bharath Love MD      Guru Lima is a 62 year old, presenting for the following health issues:  Follow Up      HPI     Diabetes Follow-up    How often are you checking your blood sugar? Two times daily  Blood sugar testing frequency justification:    What time of day are you checking your blood sugars (select all that apply)?  At bedtime and morning   Have you had any blood sugars above 200?  Yes a couple times  Have you had any blood sugars below 70?  No    What symptoms do you notice when your blood sugar is low?  None    What concerns do you have today about your diabetes? Other:      Do you have any of these symptoms? (Select all that apply)      Have you had a diabetic eye exam in the last 12 months? Yes- Date of last eye exam: July,  Location: [facility] in     It has been a rough road for him.     He was told he had a blood sugar of 600 and told he had to go to the ER. He found out because no one told him, that because of his steroid shots in his knee it would shoot up his blood sugar.    Started to feel like crap right after that. Went to ER and got diabetic education.    [place] needed glasses for driving. Tuesday night he had an eye appt, and, found out that he doesn't need any glasses anymore because the diabetes was affecting eyesight beforehand.             BP Readings from Last 2 Encounters:   08/23/22 120/74   08/09/22 114/74     Hemoglobin A1C (%)   Date Value   07/15/2022 14.8 (H)     LDL Cholesterol Calculated (mg/dL)   Date Value   11/18/2021 124 (H)   09/25/2020 114 (H)   10/29/2018 88                     Review of Systems   Constitutional: Negative.    Eyes: Negative  "for visual disturbance.   Respiratory: Negative for shortness of breath.    Cardiovascular: Negative for chest pain, palpitations and peripheral edema.   Neurological: Negative for headaches.            Objective    /74 (BP Location: Right arm, Patient Position: Sitting, Cuff Size: Adult Large)   Pulse 72   Temp 98  F (36.7  C) (Oral)   Resp 15   Ht 1.803 m (5' 11\")   Wt 90.7 kg (199 lb 14.4 oz)   SpO2 95%   BMI 27.88 kg/m    Body mass index is 27.88 kg/m .  Physical Exam  Vitals and nursing note reviewed.   HENT:      Head: Normocephalic.   Eyes:      Conjunctiva/sclera: Conjunctivae normal.   Cardiovascular:      Rate and Rhythm: Normal rate and regular rhythm.      Heart sounds: Normal heart sounds.   Pulmonary:      Effort: Pulmonary effort is normal.      Breath sounds: Normal breath sounds.   Skin:     General: Skin is warm and dry.   Neurological:      General: No focal deficit present.      Mental Status: He is alert and oriented to person, place, and time.   Psychiatric:         Mood and Affect: Mood normal.         Behavior: Behavior normal.                            .  ..  "

## 2022-08-24 LAB
CREAT UR-MCNC: 110 MG/DL
MICROALBUMIN UR-MCNC: 10 MG/L
MICROALBUMIN/CREAT UR: 9.09 MG/G CR (ref 0–17)

## 2022-08-25 ASSESSMENT — ENCOUNTER SYMPTOMS
HEADACHES: 0
SHORTNESS OF BREATH: 0
PALPITATIONS: 0
CONSTITUTIONAL NEGATIVE: 1

## 2022-09-12 ENCOUNTER — MEDICAL CORRESPONDENCE (OUTPATIENT)
Dept: HEALTH INFORMATION MANAGEMENT | Facility: CLINIC | Age: 62
End: 2022-09-12

## 2022-09-12 ENCOUNTER — TELEPHONE (OUTPATIENT)
Dept: FAMILY MEDICINE | Facility: CLINIC | Age: 62
End: 2022-09-12

## 2022-09-12 NOTE — TELEPHONE ENCOUNTER
Rec'd Drug Therapy Change Authorization from [facility]. Placed in PCP basket for review and signature. Fax [phone]    Melia Cortes-

## 2022-09-13 NOTE — TELEPHONE ENCOUNTER
Faxed and abstracted to TC basket at [facility]    Leena URIBE  [facility]   [facility]

## 2022-10-23 ENCOUNTER — HEALTH MAINTENANCE LETTER (OUTPATIENT)
Age: 62
End: 2022-10-23

## 2022-10-25 ENCOUNTER — OFFICE VISIT (OUTPATIENT)
Dept: ORTHOPEDICS | Facility: CLINIC | Age: 62
End: 2022-10-25
Payer: COMMERCIAL

## 2022-10-25 ENCOUNTER — ANCILLARY PROCEDURE (OUTPATIENT)
Dept: GENERAL RADIOLOGY | Facility: CLINIC | Age: 62
End: 2022-10-25
Attending: ORTHOPAEDIC SURGERY
Payer: COMMERCIAL

## 2022-10-25 VITALS — DIASTOLIC BLOOD PRESSURE: 72 MMHG | SYSTOLIC BLOOD PRESSURE: 110 MMHG

## 2022-10-25 DIAGNOSIS — M17.0 PRIMARY OSTEOARTHRITIS OF BOTH KNEES: ICD-10-CM

## 2022-10-25 DIAGNOSIS — M17.0 PRIMARY OSTEOARTHRITIS OF BOTH KNEES: Primary | ICD-10-CM

## 2022-10-25 PROCEDURE — 73562 X-RAY EXAM OF KNEE 3: CPT | Mod: TC | Performed by: RADIOLOGY

## 2022-10-25 PROCEDURE — 20610 DRAIN/INJ JOINT/BURSA W/O US: CPT | Mod: 50 | Performed by: ORTHOPAEDIC SURGERY

## 2022-10-25 PROCEDURE — 99212 OFFICE O/P EST SF 10 MIN: CPT | Mod: 25 | Performed by: ORTHOPAEDIC SURGERY

## 2022-10-25 RX ORDER — BUPIVACAINE HYDROCHLORIDE 5 MG/ML
3 INJECTION, SOLUTION EPIDURAL; INTRACAUDAL
Status: DISCONTINUED | OUTPATIENT
Start: 2022-10-25 | End: 2024-09-25

## 2022-10-25 RX ORDER — LIDOCAINE HYDROCHLORIDE 10 MG/ML
3 INJECTION, SOLUTION INFILTRATION; PERINEURAL
Status: DISCONTINUED | OUTPATIENT
Start: 2022-10-25 | End: 2024-09-25

## 2022-10-25 RX ORDER — TRIAMCINOLONE ACETONIDE 40 MG/ML
40 INJECTION, SUSPENSION INTRA-ARTICULAR; INTRAMUSCULAR
Status: DISCONTINUED | OUTPATIENT
Start: 2022-10-25 | End: 2024-09-25

## 2022-10-25 RX ADMIN — TRIAMCINOLONE ACETONIDE 40 MG: 40 INJECTION, SUSPENSION INTRA-ARTICULAR; INTRAMUSCULAR at 16:33

## 2022-10-25 RX ADMIN — BUPIVACAINE HYDROCHLORIDE 3 ML: 5 INJECTION, SOLUTION EPIDURAL; INTRACAUDAL at 16:33

## 2022-10-25 RX ADMIN — LIDOCAINE HYDROCHLORIDE 3 ML: 10 INJECTION, SOLUTION INFILTRATION; PERINEURAL at 16:33

## 2022-10-25 NOTE — PROGRESS NOTES
S: Patient is a 62 year old male seen today in follow up for bilateral knee pain.    They were previously evaluated on 8/9/22.  Since this visit they report gradual return of pain over the 4 weeks.  Pain is fairly equal bilaterally. He reports pain is becoming more limiting and noticeable.   Pain at nighttime why lying with legs in a crossed position or with stress on the anterior knee. .  They have tried the following therapies: bilateral knee cortisone injections 6/28/22, Aleve as needed when more active.      Current pain level: 7/10- pain will improve with light activity              Patient Active Problem List   Diagnosis     Lumbago     CARDIOVASCULAR SCREENING; LDL GOAL LESS THAN 130     Superficial phlebitis of right leg     Varicose veins of legs     Nephrolithiasis     Benign essential hypertension     Primary osteoarthritis of both knees     Type 2 diabetes mellitus with hyperglycemia, without long-term current use of insulin (H)            Past Medical History:   Diagnosis Date     Nephrolithiasis      Plantar fascial fibromatosis             Past Surgical History:   Procedure Laterality Date     CARPAL TUNNEL RELEASE RT/LT Left 03/05/2020     HC LAP,INGUINAL HERNIA REPR,INITIAL      x2, each side     SURGICAL HISTORY OF -       Right elbow screw due to fracture     ZZC APPENDECTOMY  1972            Social History     Tobacco Use     Smoking status: Passive Smoke Exposure - Never Smoker     Smokeless tobacco: Never     Tobacco comments:     passive in home   Substance Use Topics     Alcohol use: Yes     Alcohol/week: 0.0 standard drinks     Comment: 1 per week            Family History   Adopted: Yes   Problem Relation Age of Onset     Unknown/Adopted Mother      Unknown/Adopted Father                Allergies   Allergen Reactions     Nkda [No Known Drug Allergies]             Current Outpatient Medications   Medication Sig Dispense Refill     albuterol (PROAIR HFA/PROVENTIL HFA/VENTOLIN HFA) 108 (90  Base) MCG/ACT inhaler Inhale 2 puffs into the lungs every 6 hours 18 g 0     alcohol swab prep pads Use to swab area of injection/yanira as directed. 100 each 3     blood glucose (NO BRAND SPECIFIED) lancets standard Use to test blood sugar 1 times daily or as directed. 100 lancet 1     blood glucose (NO BRAND SPECIFIED) test strip Use to test blood sugar 1 times daily or as directed. To accompany: Blood Glucose Monitor Brands: per insurance. 100 strip 6     Blood Glucose Calibration (ACCU-CHEK GUIDE CONTROL) LIQD USE AS DIRECTED WITH MONITOR       blood glucose calibration (NO BRAND SPECIFIED) solution To accompany: Blood Glucose Monitor Brands: per insurance. 1 each 1     blood glucose monitoring (NO BRAND SPECIFIED) meter device kit Use to test blood sugar 1 times daily or as directed. Preferred blood glucose meter OR supplies to accompany: 1 kit 0     blood glucose monitoring (ONE TOUCH ULTRA 2) meter device kit PLEASE SEE ATTACHED FOR DETAILED DIRECTIONS       Insulin Glargine-yfgn (SEMGLEE, YFGN,) 100 UNIT/ML SOLN Inject 16 Units Subcutaneous At Bedtime 20 mL 1     Insulin Glargine-yfgn (SEMGLEE, YFGN,) 100 UNIT/ML SOPN Inject 16 Units Subcutaneous At Bedtime 15 mL 1     insulin pen needle (32G X 4 MM) 32G X 4 MM miscellaneous Use 1 pen needles daily or as directed. 100 each 1     lisinopril (ZESTRIL) 10 MG tablet Take 1 tablet (10 mg) by mouth daily 90 tablet 1     metFORMIN (GLUCOPHAGE XR) 500 MG 24 hr tablet Take 2 tablets (1,000 mg) by mouth 2 times daily (with meals) 360 tablet 1     naproxen sodium 220 MG capsule Take 220 mg by mouth 2 times daily (with meals) (Patient not taking: No sig reported)       omeprazole (PRILOSEC) 40 MG DR capsule Take 1 capsule (40 mg) by mouth daily 90 capsule 1     sildenafil (VIAGRA) 100 MG tablet Take 1 tablet (100 mg) by mouth daily as needed 30 min to 4 hrs before sex. Do not use with nitroglycerin, terazosin or doxazosin. 6 tablet 1     thin (NO BRAND SPECIFIED) lancets  Use with lanceting device. To accompany: Blood Glucose Monitor Brands: per insurance. 50 each 6          Review Of Systems  Skin: negative  Eyes: negative  Ears/Nose/Throat: negative  Respiratory: No shortness of breath, dyspnea on exertion, cough, or hemoptysis    O: Physical exam:  Pain to palpation medial joint line each knee.  Varus along the longitudinal axis each knee R greater than L.  CMS intact to both lower extremities.  No effusion either knee.    Lab:  Hgb A1C 9.2 High      Images:                                                                   IMPRESSION:  1.  Right knee: Moderate-advanced degenerative arthrosis. Moderate  medial compartment narrowing. Medial compartment subchondral  sclerosis. Mild tricompartmental marginal osteophytosis. No fracture  or joint effusion. Prominent varix in the medial leg soft tissues.  2.  Left knee: Moderate left knee degenerative arthrosis, stable.  Moderate medial compartment narrowing. Minimal patellofemoral  compartment marginal osteophytosis. No fracture or joint effusion.         A:  Med compartment arthrosis each knee.  Had marked elevation blood glucose with steroid injection but now has improved control of diabetes so would like to  Try again. Did not have any relief with previous viscosupplementation.    P:  Inject bilateral knee joint after discussion regarding diabetes exacerbation/ risks/benefits.  Verbal and written consent, ethyl chloride, chloroprep.  Follow outcomes  Notify if exacerbation symptoms.  See back 3-6 mos      Large Joint Injection/Arthocentesis: bilateral knee    Date/Time: 10/25/2022 4:33 PM  Performed by: Elia Reddy MD  Authorized by: Elia Reddy MD     Indications:  Pain and osteoarthritis  Needle Size:  25 G  Guidance: landmark guided    Approach:  Superolateral  Location:  Knee  Laterality:  Bilateral      Medications (Right):  40 mg triamcinolone 40 MG/ML; 3 mL bupivacaine (PF) 0.5 %; 3 mL lidocaine 1 %  Medications (Left):   40 mg triamcinolone 40 MG/ML; 3 mL bupivacaine (PF) 0.5 %; 3 mL lidocaine 1 %  Outcome:  Tolerated well, no immediate complications  Procedure discussed: discussed risks, benefits, and alternatives    Consent Given by:  Patient  Timeout: timeout called immediately prior to procedure               In addition to the above assessment and plan each active problem on Clarence's problem list was evaluated today. This included the questioning of Clarence for any medication problems. We will continue the current treatment plan for these active problems except as noted.

## 2022-10-25 NOTE — PATIENT INSTRUCTIONS
Thank you for choosing [facility]    Dr. Reddy Locations:    [facility] [place]   [id number] [place] [address], [address]  [place], [place] [id number] [place] [address]   Appointments: [phone] Appointments: [phone]   Fax: [phone] Fax: [phone]       Follow up: 3-6 months, as needed.   Please call [phone] to schedule your follow up appointment.     Steroid injection of the bilateral knee was performed today in clinic    - Would not soak in a hot tub, bath or swimming pool for 48 hours  - Ok to shower  - Ice today and only do your normal amounts of activity  - The lidocaine (what is giving you pain relief right now) will likely stop working in 1-2 hours.  You will then have pain again, similar to before you received the injection. The corticosteroid will not start working until approximately 1-2 weeks from now.  In a small percentage of people, cortisone can cause flushing/redness in the face. This usually lasts for 1-3 days and resolves. Cool compress and Ibuprofen/Tylenol can help if this happens.        For any questions please contact my office, [phone].

## 2022-10-25 NOTE — LETTER
10/25/2022         RE: Clarence Tai  [id number] [facility] [id number]        Dear Colleague,    Thank you for referring your patient, Clarence Tai, to the [facility] [place]. Please see a copy of my visit note below.    S: Patient is a 62 year old male seen today in follow up for bilateral knee pain.    They were previously evaluated on 8/9/22.  Since this visit they report gradual return of pain over the 4 weeks.  Pain is fairly equal bilaterally. He reports pain is becoming more limiting and noticeable.   Pain at nighttime why lying with legs in a crossed position or with stress on the anterior knee. .  They have tried the following therapies: bilateral knee cortisone injections 6/28/22, Aleve as needed when more active.      Current pain level: 7/10- pain will improve with light activity              Patient Active Problem List   Diagnosis     Lumbago     CARDIOVASCULAR SCREENING; LDL GOAL LESS THAN 130     Superficial phlebitis of right leg     Varicose veins of legs     Nephrolithiasis     Benign essential hypertension     Primary osteoarthritis of both knees     Type 2 diabetes mellitus with hyperglycemia, without long-term current use of insulin (H)            Past Medical History:   Diagnosis Date     Nephrolithiasis      Plantar fascial fibromatosis             Past Surgical History:   Procedure Laterality Date     CARPAL TUNNEL RELEASE RT/LT Left 03/05/2020     HC LAP,INGUINAL HERNIA REPR,INITIAL      x2, each side     SURGICAL HISTORY OF -       Right elbow screw due to fracture     ZZC APPENDECTOMY  1972            Social History     Tobacco Use     Smoking status: Passive Smoke Exposure - Never Smoker     Smokeless tobacco: Never     Tobacco comments:     passive in home   Substance Use Topics     Alcohol use: Yes     Alcohol/week: 0.0 standard drinks     Comment: 1 per week            Family History   Adopted: Yes   Problem Relation Age of Onset      Unknown/Adopted Mother      Unknown/Adopted Father                Allergies   Allergen Reactions     Nkda [No Known Drug Allergies]             Current Outpatient Medications   Medication Sig Dispense Refill     albuterol (PROAIR HFA/PROVENTIL HFA/VENTOLIN HFA) 108 (90 Base) MCG/ACT inhaler Inhale 2 puffs into the lungs every 6 hours 18 g 0     alcohol swab prep pads Use to swab area of injection/yanira as directed. 100 each 3     blood glucose (NO BRAND SPECIFIED) lancets standard Use to test blood sugar 1 times daily or as directed. 100 lancet 1     blood glucose (NO BRAND SPECIFIED) test strip Use to test blood sugar 1 times daily or as directed. To accompany: Blood Glucose Monitor Brands: per insurance. 100 strip 6     Blood Glucose Calibration (ACCU-CHEK GUIDE CONTROL) LIQD USE AS DIRECTED WITH MONITOR       blood glucose calibration (NO BRAND SPECIFIED) solution To accompany: Blood Glucose Monitor Brands: per insurance. 1 each 1     blood glucose monitoring (NO BRAND SPECIFIED) meter device kit Use to test blood sugar 1 times daily or as directed. Preferred blood glucose meter OR supplies to accompany: 1 kit 0     blood glucose monitoring (ONE TOUCH ULTRA 2) meter device kit PLEASE SEE ATTACHED FOR DETAILED DIRECTIONS       Insulin Glargine-yfgn (SEMGLEE, YFGN,) 100 UNIT/ML SOLN Inject 16 Units Subcutaneous At Bedtime 20 mL 1     Insulin Glargine-yfgn (SEMGLEE, YFGN,) 100 UNIT/ML SOPN Inject 16 Units Subcutaneous At Bedtime 15 mL 1     insulin pen needle (32G X 4 MM) 32G X 4 MM miscellaneous Use 1 pen needles daily or as directed. 100 each 1     lisinopril (ZESTRIL) 10 MG tablet Take 1 tablet (10 mg) by mouth daily 90 tablet 1     metFORMIN (GLUCOPHAGE XR) 500 MG 24 hr tablet Take 2 tablets (1,000 mg) by mouth 2 times daily (with meals) 360 tablet 1     naproxen sodium 220 MG capsule Take 220 mg by mouth 2 times daily (with meals) (Patient not taking: No sig reported)       omeprazole (PRILOSEC) 40 MG   capsule Take 1 capsule (40 mg) by mouth daily 90 capsule 1     sildenafil (VIAGRA) 100 MG tablet Take 1 tablet (100 mg) by mouth daily as needed 30 min to 4 hrs before sex. Do not use with nitroglycerin, terazosin or doxazosin. 6 tablet 1     thin (NO BRAND SPECIFIED) lancets Use with lanceting device. To accompany: Blood Glucose Monitor Brands: per insurance. 50 each 6          Review Of Systems  Skin: negative  Eyes: negative  Ears/Nose/Throat: negative  Respiratory: No shortness of breath, dyspnea on exertion, cough, or hemoptysis    O: Physical exam:  Pain to palpation medial joint line each knee.  Varus along the longitudinal axis each knee R greater than L.  CMS intact to both lower extremities.  No effusion either knee.    Lab:  Hgb A1C 9.2 High      Images:                                                                   IMPRESSION:  1.  Right knee: Moderate-advanced degenerative arthrosis. Moderate  medial compartment narrowing. Medial compartment subchondral  sclerosis. Mild tricompartmental marginal osteophytosis. No fracture  or joint effusion. Prominent varix in the medial leg soft tissues.  2.  Left knee: Moderate left knee degenerative arthrosis, stable.  Moderate medial compartment narrowing. Minimal patellofemoral  compartment marginal osteophytosis. No fracture or joint effusion.         A:  Med compartment arthrosis each knee.  Had marked elevation blood glucose with steroid injection but now has improved control of diabetes so would like to  Try again. Did not have any relief with previous viscosupplementation.    P:  Inject bilateral knee joint after discussion regarding diabetes exacerbation/ risks/benefits.  Verbal and written consent, ethyl chloride, chloroprep.  Follow outcomes  Notify if exacerbation symptoms.  See back 3-6 mos      Large Joint Injection/Arthocentesis: bilateral knee    Date/Time: 10/25/2022 4:33 PM  Performed by: Elia Reddy MD  Authorized by: Elia Reddy MD      Indications:  Pain and osteoarthritis  Needle Size:  25 G  Guidance: landmark guided    Approach:  Superolateral  Location:  Knee  Laterality:  Bilateral      Medications (Right):  40 mg triamcinolone 40 MG/ML; 3 mL bupivacaine (PF) 0.5 %; 3 mL lidocaine 1 %  Medications (Left):  40 mg triamcinolone 40 MG/ML; 3 mL bupivacaine (PF) 0.5 %; 3 mL lidocaine 1 %  Outcome:  Tolerated well, no immediate complications  Procedure discussed: discussed risks, benefits, and alternatives    Consent Given by:  Patient  Timeout: timeout called immediately prior to procedure               In addition to the above assessment and plan each active problem on Clarence's problem list was evaluated today. This included the questioning of Clarence for any medication problems. We will continue the current treatment plan for these active problems except as noted.        Again, thank you for allowing me to participate in the care of your patient.        Sincerely,        JIM FOSS MD

## 2022-10-31 ENCOUNTER — TELEPHONE (OUTPATIENT)
Dept: FAMILY MEDICINE | Facility: CLINIC | Age: 62
End: 2022-10-31

## 2022-10-31 NOTE — TELEPHONE ENCOUNTER
Wife calls [facility]  to schedule Covid Booster, sent to RN, cannot get ahold of anyone at , routed to TC, please call wife to schedule covid booster, thanks, wants at the  clinic, tried several extensions at  and no answer, TC please call to schedule  Ashleigh Gibson RN, BSN  [facility]

## 2022-11-08 ENCOUNTER — ALLIED HEALTH/NURSE VISIT (OUTPATIENT)
Dept: FAMILY MEDICINE | Facility: CLINIC | Age: 62
End: 2022-11-08
Payer: COMMERCIAL

## 2022-11-08 DIAGNOSIS — Z23 COVID-19 VACCINE ADMINISTERED: Primary | ICD-10-CM

## 2022-11-08 PROCEDURE — 91313 COVID-19,PF,MODERNA BIVALENT: CPT

## 2022-11-08 PROCEDURE — 99207 PR NO CHARGE NURSE ONLY: CPT

## 2022-11-08 PROCEDURE — 0134A COVID-19,PF,MODERNA BIVALENT: CPT

## 2022-11-30 ENCOUNTER — TRANSFERRED RECORDS (OUTPATIENT)
Dept: HEALTH INFORMATION MANAGEMENT | Facility: CLINIC | Age: 62
End: 2022-11-30

## 2022-12-01 ENCOUNTER — TELEPHONE (OUTPATIENT)
Dept: ORTHOPEDICS | Facility: CLINIC | Age: 62
End: 2022-12-01

## 2022-12-01 NOTE — TELEPHONE ENCOUNTER
Assessment/Plan:    1. Paresthesia and pain of both upper extremities- ck labs. Wear carpal tunnel splint at night.   - TSH 3RD GENERATION; Future  - HEMOGLOBIN A1C W/O EAG; Future    2. Prediabetes  - HEMOGLOBIN A1C W/O EAG; Future      The plan was discussed with the patient. The patient verbalized understanding and is in agreement with the plan. All medication potential side effects were discussed with the patient. Health Maintenance: has colo upcoming. Health Maintenance   Topic Date Due    Influenza Age 5 to Adult  08/01/2019    COLONOSCOPY  12/14/2019    Shingrix Vaccine Age 50> (1 of 2) 05/30/2020 (Originally 11/27/1992)    MEDICARE YEARLY EXAM  05/16/2020    GLAUCOMA SCREENING Q2Y  06/01/2020    DTaP/Tdap/Td series (2 - Td) 10/20/2026    Bone Densitometry (Dexa) Screening  Completed    Pneumococcal 65+ years  Completed       Halie Davila is a 70 y.o. female and presents with Tingling (numbness in fingers at night)     Subjective:  Recently had breast abnormality on mammo. Has appt tomorrow. Pt is very anxious about the whole thing. Pt c/o bilat finger numbness. Happens at night mostly. She doesn't know how she sleeps or the position she sleeps in. Has been going on for months. ROS:  Constitutional: No recent weight change. No weakness/fatigue. No f/c. Cardiovascular: No CP/palpitations. No CASTRO/orthopnea/PND. Respiratory: No cough/sputum, dyspnea, wheezing. Gastointestinal: No dysphagia, reflux. No n/v. No constipation/diarrhea. No melena/rectal bleeding. The problem list was updated as a part of today's visit.   Patient Active Problem List   Diagnosis Code    DDD (degenerative disc disease), lumbar M51.36    Hypertension I10    Hyperlipidemia E78.5    Vitamin D deficiency E55.9    Osteoarthritis of right knee M17.11    Osteopenia M85.80    Complete rotator cuff tear of left shoulder M75.122    CKD (chronic kidney disease) N18.9    Prediabetes R73.03    Lm to jesse appointment on 02/28 with Dr Reddy.   Abnormal mammogram R92.8       [facility],  were reviewed. SH:  Social History     Tobacco Use    Smoking status: Never Smoker    Smokeless tobacco: Never Used   Substance Use Topics    Alcohol use: No    Drug use: No       Medications/Allergies:  Current Outpatient Medications on File Prior to Visit   Medication Sig Dispense Refill    furosemide (LASIX) 20 mg tablet TAKE 1 TO 2 TABLETS EVERY DAY AS NEEDED FOR SWELLING 90 Tab 0    potassium chloride (K-DUR, KLOR-CON) 20 mEq tablet TAKE 1 TABLET EVERY DAY 90 Tab 1    metoprolol succinate (TOPROL-XL) 100 mg tablet TAKE 1 TABLET EVERY DAY 90 Tab 1    hydroCHLOROthiazide (HYDRODIURIL) 25 mg tablet TAKE 1 TABLET EVERY DAY 90 Tab 1    atorvastatin (LIPITOR) 10 mg tablet TAKE 1 TABLET EVERY DAY 90 Tab 1    ibuprofen (MOTRIN) 800 mg tablet Take 1 Tab by mouth every eight (8) hours as needed for Pain. 90 Tab 1    glucosam/brooke-msm1/C/luciano/bosw (OSTEO BI-FLEX TRIPLE STRENGTH PO) Take  by mouth daily. With Vit D3       traZODone (DESYREL) 100 mg tablet TAKE 1 TABLET EVERY NIGHT 90 Tab 1    calcium carbonate (CALTREX) 600 mg calcium (1,500 mg) tablet Take 600 mg by mouth two (2) times a day.  folic acid/multivit-min/lutein (CENTRUM SILVER PO) Take  by mouth.  cholecalciferol (VITAMIN D3) 1,000 unit cap Take  by mouth daily. No current facility-administered medications on file prior to visit. No Known Allergies    Objective:  Visit Vitals  /79 (BP 1 Location: Left arm, BP Patient Position: Sitting)   Pulse 61   Temp 97.5 °F (36.4 °C) (Oral)   Resp 16   Ht 5' 7\" (1.702 m)   Wt 197 lb (89.4 kg)   SpO2 99%   BMI 30.85 kg/m²      Constitutional: Well developed, nourished, no distress, alert, obese habitus   CV: S1, S2.  RRR. No murmurs/rubs. No edema. Pulm: No abnormalities on inspection. Clear to auscultation bilaterally. No wheezing/rhonchi. Normal effort. Neuro: A/O x 3. No focal motor or sensory deficits.  Speech normal. Normal monofilament bilat hands. +tinnel     Labwork and Ancillary Studies:    CBC w/Diff  Lab Results   Component Value Date/Time    WBC 3.2 (L) 05/13/2019 01:12 AM    HGB 10.3 (L) 05/13/2019 01:12 AM    PLATELET 032 55/70/2508 01:12 AM         Basic Metabolic Profile/LFTs  Lab Results   Component Value Date/Time    Sodium 143 05/13/2019 01:12 AM    Potassium 3.8 05/13/2019 01:12 AM    Chloride 105 05/13/2019 01:12 AM    CO2 21 05/13/2019 01:12 AM    Anion gap 7 02/29/2016 04:10 PM    Glucose 119 (H) 05/13/2019 01:12 AM    BUN 13 05/13/2019 01:12 AM    Creatinine 0.97 05/13/2019 01:12 AM    BUN/Creatinine ratio 13 05/13/2019 01:12 AM    GFR est AA 66 05/13/2019 01:12 AM    GFR est non-AA 57 (L) 05/13/2019 01:12 AM    Calcium 9.2 05/13/2019 01:12 AM      Lab Results   Component Value Date/Time    ALT (SGPT) 9 05/13/2019 01:12 AM    AST (SGOT) 14 05/13/2019 01:12 AM    Alk.  phosphatase 64 05/13/2019 01:12 AM    Bilirubin, total 0.2 05/13/2019 01:12 AM       Cholesterol  Lab Results   Component Value Date/Time    Cholesterol, total 193 05/13/2019 01:12 AM    HDL Cholesterol 38 (L) 05/13/2019 01:12 AM    LDL, calculated 122 (H) 05/13/2019 01:12 AM    Triglyceride 163 (H) 05/13/2019 01:12 AM

## 2022-12-10 ENCOUNTER — HEALTH MAINTENANCE LETTER (OUTPATIENT)
Age: 62
End: 2022-12-10

## 2022-12-20 NOTE — TELEPHONE ENCOUNTER
Left message for patient to call and schedule a wellness visit with labs.    Nissa Paulson MA       negative cranial nerves II-XII intact

## 2023-03-03 NOTE — PROGRESS NOTES
S: Patient is a 63 year old seen today in follow up for bilateral knee pain. They were previously evaluated on 10/25/2022,  since this visit they report slightly worsening pain in bilateral knees. Pain causes patient wakes at nighttime. Pain is improved by corticosteriod injections temporarily.  They have tried the following therapies: previous bilateral corticosteriod injections on 10/25/2022 which provided ~3 months of relief, take Aleve as needed, tried physical therapy in 2021 (4+ visits)    Current pain level: 4/10, worse with activity and worse in am     Patient is currently working at desk job           Patient Active Problem List   Diagnosis     Lumbago     CARDIOVASCULAR SCREENING; LDL GOAL LESS THAN 130     Superficial phlebitis of right leg     Varicose veins of legs     Nephrolithiasis     Benign essential hypertension     Primary osteoarthritis of both knees     Type 2 diabetes mellitus with hyperglycemia, without long-term current use of insulin (H)            Past Medical History:   Diagnosis Date     Nephrolithiasis      Plantar fascial fibromatosis             Past Surgical History:   Procedure Laterality Date     CARPAL TUNNEL RELEASE RT/LT Left 03/05/2020     HC LAP,INGUINAL HERNIA REPR,INITIAL      x2, each side     SURGICAL HISTORY OF -       Right elbow screw due to fracture     ZZC APPENDECTOMY  1972            Social History     Tobacco Use     Smoking status: Never     Passive exposure: Yes     Smokeless tobacco: Never     Tobacco comments:     passive in home   Substance Use Topics     Alcohol use: Yes     Alcohol/week: 0.0 standard drinks     Comment: 1 per week            Family History   Adopted: Yes   Problem Relation Age of Onset     Unknown/Adopted Mother      Unknown/Adopted Father                Allergies   Allergen Reactions     Nkda [No Known Drug Allergies]             Current Outpatient Medications   Medication Sig Dispense Refill     albuterol (PROAIR HFA/PROVENTIL HFA/VENTOLIN  HFA) 108 (90 Base) MCG/ACT inhaler Inhale 2 puffs into the lungs every 6 hours 18 g 0     alcohol swab prep pads Use to swab area of injection/yanira as directed. 100 each 3     blood glucose (NO BRAND SPECIFIED) lancets standard Use to test blood sugar 1 times daily or as directed. 100 lancet 1     blood glucose (NO BRAND SPECIFIED) test strip Use to test blood sugar 1 times daily or as directed. To accompany: Blood Glucose Monitor Brands: per insurance. 100 strip 6     Blood Glucose Calibration (ACCU-CHEK GUIDE CONTROL) LIQD USE AS DIRECTED WITH MONITOR       blood glucose calibration (NO BRAND SPECIFIED) solution To accompany: Blood Glucose Monitor Brands: per insurance. 1 each 1     blood glucose monitoring (NO BRAND SPECIFIED) meter device kit Use to test blood sugar 1 times daily or as directed. Preferred blood glucose meter OR supplies to accompany: 1 kit 0     blood glucose monitoring (ONE TOUCH ULTRA 2) meter device kit PLEASE SEE ATTACHED FOR DETAILED DIRECTIONS       Insulin Glargine-yfgn (SEMGLEE, YFGN,) 100 UNIT/ML SOLN Inject 16 Units Subcutaneous At Bedtime 20 mL 1     Insulin Glargine-yfgn (SEMGLEE, YFGN,) 100 UNIT/ML SOPN Inject 16 Units Subcutaneous At Bedtime 15 mL 1     insulin pen needle (32G X 4 MM) 32G X 4 MM miscellaneous Use 1 pen needles daily or as directed. 100 each 1     lisinopril (ZESTRIL) 10 MG tablet TAKE 1 TABLET BY MOUTH DAILY 90 tablet 2     metFORMIN (GLUCOPHAGE XR) 500 MG 24 hr tablet TAKE 2 TABLETS BY MOUTH TWICE DAILY WITH MEALS 360 tablet 2     naproxen sodium 220 MG capsule Take 220 mg by mouth 2 times daily (with meals) (Patient not taking: No sig reported)       omeprazole (PRILOSEC) 40 MG DR capsule TAKE 1 CAPSULE BY MOUTH DAILY 90 capsule 2     sildenafil (VIAGRA) 100 MG tablet Take 1 tablet (100 mg) by mouth daily as needed 30 min to 4 hrs before sex. Do not use with nitroglycerin, terazosin or doxazosin. 6 tablet 1     thin (NO BRAND SPECIFIED) lancets Use with  lanceting device. To accompany: Blood Glucose Monitor Brands: per insurance. 50 each 6          Review Of Systems  Skin: negative  Eyes: negative  Ears/Nose/Throat: negative  Respiratory: No shortness of breath, dyspnea on exertion, cough, or hemoptysis    O: Physical Exam:  Pain to palpation medial joint line each knee left greater than right.  CMS intact to both lower extremities.  Adequate knee flexion and extension.  Some effusion each knee.       Lab:  HgbA1C 9.2    Images:  DATE/TIME: 10/25/2022 4:18 PM      INDICATION: Bilateral knee pain.   COMPARISON: 8/20/2021.                                                                      IMPRESSION:  1.  Right knee: Moderate-advanced degenerative arthrosis. Moderate  medial compartment narrowing. Medial compartment subchondral  sclerosis. Mild tricompartmental marginal osteophytosis. No fracture  or joint effusion. Prominent varix in the medial leg soft tissues.  2.  Left knee: Moderate left knee degenerative arthrosis, stable.  Moderate medial compartment narrowing. Minimal patellofemoral  compartment marginal osteophytosis. No fracture or joint effusion.         A:  Medial compartment arthrosis each knee left more symptomatic than R.  He feels his diabetes has stabilized.    P:  We have discussed pros and cons of viscosupplementation vs steroid injection.  He has   Diabetes and initially blood sugars were worse but most recent injection  Did not have any lasting issues.  Feels he  Is in  Better control now.  Feels that viscosupplementation did not help at all in past and steroid injections seem to help better.  We also discussed unicompartmental knee arthroplasty vs TKA.  Today we will  Proceed with steroid injection after verbal and written consent.   Ethyl chloride and chloroprep.  He will  Consider future  Options.  Will notify if exacerbation symptoms.  Follow outcomes  See back  3-6 mos.    Large Joint Injection/Arthocentesis: bilateral knee    Date/Time:  3/6/2023 8:27 AM    Performed by: Elia Reddy MD  Authorized by: Elia Reddy MD    Needle Size:  22 G  Guidance: landmark guided    Location:  Knee  Laterality:  Bilateral      Medications (Right):  40 mg triamcinolone 40 MG/ML; 3 mL lidocaine 1 %; 3 mL bupivacaine HCl (PF) 0.25 %  Medications (Left):  40 mg triamcinolone 40 MG/ML; 3 mL lidocaine 1 %; 3 mL bupivacaine HCl (PF) 0.25 %  Outcome:  Tolerated well, no immediate complications  Procedure discussed: discussed risks, benefits, and alternatives    Consent Given by:  Patient  Timeout: timeout called immediately prior to procedure    Prep: patient was prepped and draped in usual sterile fashion      I was present entire visit  I performed injection  Elia Reddy MD         In addition to the above assessment and plan each active problem on Clarence's problem list was evaluated today. This included the questioning of Clarence for any medication problems. We will continue the current treatment plan for these active problems except as noted.

## 2023-03-06 ENCOUNTER — OFFICE VISIT (OUTPATIENT)
Dept: ORTHOPEDICS | Facility: CLINIC | Age: 63
End: 2023-03-06
Payer: COMMERCIAL

## 2023-03-06 VITALS — BODY MASS INDEX: 27.86 KG/M2 | WEIGHT: 199 LBS | HEIGHT: 71 IN

## 2023-03-06 DIAGNOSIS — M17.10 ARTHRITIS OF KNEE: Primary | ICD-10-CM

## 2023-03-06 PROCEDURE — 20610 DRAIN/INJ JOINT/BURSA W/O US: CPT | Mod: 50 | Performed by: ORTHOPAEDIC SURGERY

## 2023-03-06 PROCEDURE — 99213 OFFICE O/P EST LOW 20 MIN: CPT | Performed by: ORTHOPAEDIC SURGERY

## 2023-03-06 RX ADMIN — BUPIVACAINE HYDROCHLORIDE 3 ML: 2.5 INJECTION, SOLUTION EPIDURAL; INFILTRATION; INTRACAUDAL at 08:27

## 2023-03-06 RX ADMIN — TRIAMCINOLONE ACETONIDE 40 MG: 40 INJECTION, SUSPENSION INTRA-ARTICULAR; INTRAMUSCULAR at 08:27

## 2023-03-06 RX ADMIN — LIDOCAINE HYDROCHLORIDE 3 ML: 10 INJECTION, SOLUTION INFILTRATION; PERINEURAL at 08:27

## 2023-03-06 NOTE — LETTER
3/6/2023         RE: Clarence Tai  [address] [facility] [address]        Dear Colleague,    Thank you for referring your patient, Clarence Tai, to the [facility] [place]. Please see a copy of my visit note below.    S: Patient is a 63 year old seen today in follow up for bilateral knee pain. They were previously evaluated on 10/25/2022,  since this visit they report slightly worsening pain in bilateral knees. Pain causes patient wakes at nighttime. Pain is improved by corticosteriod injections temporarily.  They have tried the following therapies: previous bilateral corticosteriod injections on 10/25/2022 which provided ~3 months of relief, take Aleve as needed, tried physical therapy in 2021 (4+ visits)    Current pain level: 4/10, worse with activity and worse in am     Patient is currently working at desk job           Patient Active Problem List   Diagnosis     Lumbago     CARDIOVASCULAR SCREENING; LDL GOAL LESS THAN 130     Superficial phlebitis of right leg     Varicose veins of legs     Nephrolithiasis     Benign essential hypertension     Primary osteoarthritis of both knees     Type 2 diabetes mellitus with hyperglycemia, without long-term current use of insulin (H)            Past Medical History:   Diagnosis Date     Nephrolithiasis      Plantar fascial fibromatosis             Past Surgical History:   Procedure Laterality Date     CARPAL TUNNEL RELEASE RT/LT Left 03/05/2020     HC LAP,INGUINAL HERNIA REPR,INITIAL      x2, each side     SURGICAL HISTORY OF -       Right elbow screw due to fracture     ZZC APPENDECTOMY  1972            Social History     Tobacco Use     Smoking status: Never     Passive exposure: Yes     Smokeless tobacco: Never     Tobacco comments:     passive in home   Substance Use Topics     Alcohol use: Yes     Alcohol/week: 0.0 standard drinks     Comment: 1 per week            Family History   Adopted: Yes   Problem Relation Age of  Onset     Unknown/Adopted Mother      Unknown/Adopted Father                Allergies   Allergen Reactions     Nkda [No Known Drug Allergies]             Current Outpatient Medications   Medication Sig Dispense Refill     albuterol (PROAIR HFA/PROVENTIL HFA/VENTOLIN HFA) 108 (90 Base) MCG/ACT inhaler Inhale 2 puffs into the lungs every 6 hours 18 g 0     alcohol swab prep pads Use to swab area of injection/yanira as directed. 100 each 3     blood glucose (NO BRAND SPECIFIED) lancets standard Use to test blood sugar 1 times daily or as directed. 100 lancet 1     blood glucose (NO BRAND SPECIFIED) test strip Use to test blood sugar 1 times daily or as directed. To accompany: Blood Glucose Monitor Brands: per insurance. 100 strip 6     Blood Glucose Calibration (ACCU-CHEK GUIDE CONTROL) LIQD USE AS DIRECTED WITH MONITOR       blood glucose calibration (NO BRAND SPECIFIED) solution To accompany: Blood Glucose Monitor Brands: per insurance. 1 each 1     blood glucose monitoring (NO BRAND SPECIFIED) meter device kit Use to test blood sugar 1 times daily or as directed. Preferred blood glucose meter OR supplies to accompany: 1 kit 0     blood glucose monitoring (ONE TOUCH ULTRA 2) meter device kit PLEASE SEE ATTACHED FOR DETAILED DIRECTIONS       Insulin Glargine-yfgn (SEMGLEE, YFGN,) 100 UNIT/ML SOLN Inject 16 Units Subcutaneous At Bedtime 20 mL 1     Insulin Glargine-yfgn (SEMGLEE, YFGN,) 100 UNIT/ML SOPN Inject 16 Units Subcutaneous At Bedtime 15 mL 1     insulin pen needle (32G X 4 MM) 32G X 4 MM miscellaneous Use 1 pen needles daily or as directed. 100 each 1     lisinopril (ZESTRIL) 10 MG tablet TAKE 1 TABLET BY MOUTH DAILY 90 tablet 2     metFORMIN (GLUCOPHAGE XR) 500 MG 24 hr tablet TAKE 2 TABLETS BY MOUTH TWICE DAILY WITH MEALS 360 tablet 2     naproxen sodium 220 MG capsule Take 220 mg by mouth 2 times daily (with meals) (Patient not taking: No sig reported)       omeprazole (PRILOSEC) 40 MG DR capsule TAKE 1  CAPSULE BY MOUTH DAILY 90 capsule 2     sildenafil (VIAGRA) 100 MG tablet Take 1 tablet (100 mg) by mouth daily as needed 30 min to 4 hrs before sex. Do not use with nitroglycerin, terazosin or doxazosin. 6 tablet 1     thin (NO BRAND SPECIFIED) lancets Use with lanceting device. To accompany: Blood Glucose Monitor Brands: per insurance. 50 each 6          Review Of Systems  Skin: negative  Eyes: negative  Ears/Nose/Throat: negative  Respiratory: No shortness of breath, dyspnea on exertion, cough, or hemoptysis    O: Physical Exam:  Pain to palpation medial joint line each knee left greater than right.  CMS intact to both lower extremities.  Adequate knee flexion and extension.  Some effusion each knee.       Lab:  HgbA1C 9.2    Images:  DATE/TIME: 10/25/2022 4:18 PM      INDICATION: Bilateral knee pain.   COMPARISON: 8/20/2021.                                                                      IMPRESSION:  1.  Right knee: Moderate-advanced degenerative arthrosis. Moderate  medial compartment narrowing. Medial compartment subchondral  sclerosis. Mild tricompartmental marginal osteophytosis. No fracture  or joint effusion. Prominent varix in the medial leg soft tissues.  2.  Left knee: Moderate left knee degenerative arthrosis, stable.  Moderate medial compartment narrowing. Minimal patellofemoral  compartment marginal osteophytosis. No fracture or joint effusion.         A:  Medial compartment arthrosis each knee left more symptomatic than R.  He feels his diabetes has stabilized.    P:  We have discussed pros and cons of viscosupplementation vs steroid injection.  He has   Diabetes and initially blood sugars were worse but most recent injection  Did not have any lasting issues.  Feels he  Is in  Better control now.  Feels that viscosupplementation did not help at all in past and steroid injections seem to help better.  We also discussed unicompartmental knee arthroplasty vs TKA.  Today we will  Proceed with  steroid injection after verbal and written consent.   Ethyl chloride and chloroprep.  He will  Consider future  Options.  Will notify if exacerbation symptoms.  Follow outcomes  See back  3-6 mos.           In addition to the above assessment and plan each active problem on Clarence's problem list was evaluated today. This included the questioning of Clarence for any medication problems. We will continue the current treatment plan for these active problems except as noted.        Again, thank you for allowing me to participate in the care of your patient.        Sincerely,        JIM FOSS MD

## 2023-04-02 ENCOUNTER — HEALTH MAINTENANCE LETTER (OUTPATIENT)
Age: 63
End: 2023-04-02

## 2023-05-08 RX ORDER — BUPIVACAINE HYDROCHLORIDE 2.5 MG/ML
3 INJECTION, SOLUTION EPIDURAL; INFILTRATION; INTRACAUDAL
Status: DISCONTINUED | OUTPATIENT
Start: 2023-03-06 | End: 2023-03-06

## 2023-05-08 RX ORDER — LIDOCAINE HYDROCHLORIDE 10 MG/ML
3 INJECTION, SOLUTION INFILTRATION; PERINEURAL
Status: DISCONTINUED | OUTPATIENT
Start: 2023-03-06 | End: 2023-03-06

## 2023-05-08 RX ORDER — TRIAMCINOLONE ACETONIDE 40 MG/ML
40 INJECTION, SUSPENSION INTRA-ARTICULAR; INTRAMUSCULAR
Status: DISCONTINUED | OUTPATIENT
Start: 2023-03-06 | End: 2023-03-06

## 2023-07-31 ENCOUNTER — OFFICE VISIT (OUTPATIENT)
Dept: ORTHOPEDICS | Facility: CLINIC | Age: 63
End: 2023-07-31
Payer: COMMERCIAL

## 2023-07-31 VITALS
WEIGHT: 213 LBS | SYSTOLIC BLOOD PRESSURE: 114 MMHG | HEIGHT: 71 IN | BODY MASS INDEX: 29.82 KG/M2 | DIASTOLIC BLOOD PRESSURE: 70 MMHG

## 2023-07-31 DIAGNOSIS — E11.65 TYPE 2 DIABETES MELLITUS WITH HYPERGLYCEMIA, WITHOUT LONG-TERM CURRENT USE OF INSULIN (H): ICD-10-CM

## 2023-07-31 DIAGNOSIS — M17.10 ARTHRITIS OF KNEE: Primary | ICD-10-CM

## 2023-07-31 PROCEDURE — 20610 DRAIN/INJ JOINT/BURSA W/O US: CPT | Mod: 50 | Performed by: ORTHOPAEDIC SURGERY

## 2023-07-31 RX ADMIN — TRIAMCINOLONE ACETONIDE 40 MG: 40 INJECTION, SUSPENSION INTRA-ARTICULAR; INTRAMUSCULAR at 16:48

## 2023-07-31 RX ADMIN — BUPIVACAINE HYDROCHLORIDE 3 ML: 2.5 INJECTION, SOLUTION EPIDURAL; INFILTRATION; INTRACAUDAL at 16:48

## 2023-07-31 RX ADMIN — LIDOCAINE HYDROCHLORIDE 3 ML: 10 INJECTION, SOLUTION INFILTRATION; PERINEURAL at 16:48

## 2023-07-31 NOTE — LETTER
7/31/2023         RE: Clarence Tai  [address] [facility] [address]        Dear Colleague,    Thank you for referring your patient, Clarence Tai, to the [facility] [place]. Please see a copy of my visit note below.    S:Patient is a 63 year old,  male seen today in follow up for bilateral knee.    They were previously evaluated on 3/6/23,  Since this visit they report patient reports 4 month of relief, current  pain 5/10, at it worst 9/10 patient is interested in another injection. Worse with increase walking.             Patient Active Problem List   Diagnosis     Lumbago     CARDIOVASCULAR SCREENING; LDL GOAL LESS THAN 130     Superficial phlebitis of right leg     Varicose veins of legs     Nephrolithiasis     Benign essential hypertension     Primary osteoarthritis of both knees     Type 2 diabetes mellitus with hyperglycemia, without long-term current use of insulin (H)            Past Medical History:   Diagnosis Date     Nephrolithiasis      Plantar fascial fibromatosis             Past Surgical History:   Procedure Laterality Date     CARPAL TUNNEL RELEASE RT/LT Left 03/05/2020     HC LAP,INGUINAL HERNIA REPR,INITIAL      x2, each side     SURGICAL HISTORY OF -       Right elbow screw due to fracture     ZZC APPENDECTOMY  1972            Social History     Tobacco Use     Smoking status: Never     Passive exposure: Yes     Smokeless tobacco: Never     Tobacco comments:     passive in home   Substance Use Topics     Alcohol use: Yes     Alcohol/week: 0.0 standard drinks of alcohol     Comment: 1 per week            Family History   Adopted: Yes   Problem Relation Age of Onset     Unknown/Adopted Mother      Unknown/Adopted Father                Allergies   Allergen Reactions     Nkda [No Known Drug Allergy]             Current Outpatient Medications   Medication Sig Dispense Refill     albuterol (PROAIR HFA/PROVENTIL HFA/VENTOLIN HFA) 108 (90 Base) MCG/ACT  inhaler Inhale 2 puffs into the lungs every 6 hours 18 g 0     alcohol swab prep pads Use to swab area of injection/yanira as directed. 100 each 3     blood glucose (NO BRAND SPECIFIED) lancets standard Use to test blood sugar 1 times daily or as directed. 100 lancet 1     blood glucose (NO BRAND SPECIFIED) test strip Use to test blood sugar 1 times daily or as directed. To accompany: Blood Glucose Monitor Brands: per insurance. 100 strip 6     Blood Glucose Calibration (ACCU-CHEK GUIDE CONTROL) LIQD USE AS DIRECTED WITH MONITOR       blood glucose calibration (NO BRAND SPECIFIED) solution To accompany: Blood Glucose Monitor Brands: per insurance. 1 each 1     blood glucose monitoring (NO BRAND SPECIFIED) meter device kit Use to test blood sugar 1 times daily or as directed. Preferred blood glucose meter OR supplies to accompany: 1 kit 0     blood glucose monitoring (ONE TOUCH ULTRA 2) meter device kit PLEASE SEE ATTACHED FOR DETAILED DIRECTIONS       Insulin Glargine-yfgn (SEMGLEE, YFGN,) 100 UNIT/ML SOLN Inject 16 Units Subcutaneous At Bedtime 20 mL 1     Insulin Glargine-yfgn (SEMGLEE, YFGN,) 100 UNIT/ML SOPN Inject 16 Units Subcutaneous At Bedtime 15 mL 1     insulin pen needle (32G X 4 MM) 32G X 4 MM miscellaneous Use 1 pen needles daily or as directed. 100 each 1     lisinopril (ZESTRIL) 10 MG tablet TAKE 1 TABLET BY MOUTH DAILY 90 tablet 2     metFORMIN (GLUCOPHAGE XR) 500 MG 24 hr tablet TAKE 2 TABLETS BY MOUTH TWICE DAILY WITH MEALS 360 tablet 2     naproxen sodium 220 MG capsule Take 220 mg by mouth 2 times daily (with meals) (Patient not taking: Reported on 7/15/2022)       omeprazole (PRILOSEC) 40 MG DR capsule TAKE 1 CAPSULE BY MOUTH DAILY 90 capsule 2     sildenafil (VIAGRA) 100 MG tablet Take 1 tablet (100 mg) by mouth daily as needed 30 min to 4 hrs before sex. Do not use with nitroglycerin, terazosin or doxazosin. 6 tablet 1     thin (NO BRAND SPECIFIED) lancets Use with lanceting device. To  accompany: Blood Glucose Monitor Brands: per insurance. 50 each 6          Review Of Systems  Skin: negative  Eyes: negative  Ears/Nose/Throat: negative  Respiratory: No shortness of breath, dyspnea on exertion, cough, or hemoptysis    O: Physical Exam:  Pain to palpation medial and lateral joint line as well as parapatellar.  CMS intact to both lower extremities.  Adequate knee flexion ne extension.  No evidence instability.    Lab:re order inflammatory markers/arthritic profile  HgbA1C 9.2    Images:  Narrative & Impression   KNEE BILATERAL 3 VIEWS  DATE/TIME: 10/25/2022 4:18 PM      INDICATION: Bilateral knee pain.   COMPARISON: 8/20/2021.                                                                      IMPRESSION:  1.  Right knee: Moderate-advanced degenerative arthrosis. Moderate  medial compartment narrowing. Medial compartment subchondral  sclerosis. Mild tricompartmental marginal osteophytosis. No fracture  or joint effusion. Prominent varix in the medial leg soft tissues.  2.  Left knee: Moderate left knee degenerative arthrosis, stable.  Moderate medial compartment narrowing. Minimal patellofemoral  compartment marginal osteophytosis. No fracture or joint effusion.            A:  Bilateral Knee OA    P:  Inject bilateral knee joints after verbal and written consent, ethyl chloride/chloroprep.  Follow outcomes  Discussed viscosupplementation.  See back 3-6 mos  Notify if exacerbation symptoms.           In addition to the above assessment and plan each active problem on Clarence's problem list was evaluated today. This included the questioning of Clarence for any medication problems. We will continue the current treatment plan for these active problems except as noted.    Large Joint Injection/Arthocentesis: bilateral knee    Date/Time: 7/31/2023 4:48 PM    Performed by: Elia Reddy MD  Authorized by: Elia Reddy MD    Needle Size:  22 G  Location:  Knee  Laterality:  Bilateral      Medications (Right):   40 mg triamcinolone 40 MG/ML; 3 mL bupivacaine HCl (PF) 0.25 %; 3 mL lidocaine 1 %  Medications (Left):  40 mg triamcinolone 40 MG/ML; 3 mL bupivacaine HCl (PF) 0.25 %; 3 mL lidocaine 1 %  Outcome:  Tolerated well, no immediate complications  Procedure discussed: discussed risks, benefits, and alternatives    Consent Given by:  Patient  Timeout: timeout called immediately prior to procedure    Prep: patient was prepped and draped in usual sterile fashion      I was present entire visit and I performed injections.  Jim Foss MD      Again, thank you for allowing me to participate in the care of your patient.        Sincerely,        JIM FOSS MD

## 2023-07-31 NOTE — PROGRESS NOTES
S:Patient is a 63 year old,  male seen today in follow up for bilateral knee.    They were previously evaluated on 3/6/23,  Since this visit they report patient reports 4 month of relief, current  pain 5/10, at it worst 9/10 patient is interested in another injection. Worse with increase walking.             Patient Active Problem List   Diagnosis    Lumbago    CARDIOVASCULAR SCREENING; LDL GOAL LESS THAN 130    Superficial phlebitis of right leg    Varicose veins of legs    Nephrolithiasis    Benign essential hypertension    Primary osteoarthritis of both knees    Type 2 diabetes mellitus with hyperglycemia, without long-term current use of insulin (H)            Past Medical History:   Diagnosis Date    Nephrolithiasis     Plantar fascial fibromatosis             Past Surgical History:   Procedure Laterality Date    CARPAL TUNNEL RELEASE RT/LT Left 03/05/2020    HC LAP,INGUINAL HERNIA REPR,INITIAL      x2, each side    SURGICAL HISTORY OF -       Right elbow screw due to fracture    ZZC APPENDECTOMY  1972            Social History     Tobacco Use    Smoking status: Never     Passive exposure: Yes    Smokeless tobacco: Never    Tobacco comments:     passive in home   Substance Use Topics    Alcohol use: Yes     Alcohol/week: 0.0 standard drinks of alcohol     Comment: 1 per week            Family History   Adopted: Yes   Problem Relation Age of Onset    Unknown/Adopted Mother     Unknown/Adopted Father                Allergies   Allergen Reactions    Nkda [No Known Drug Allergy]             Current Outpatient Medications   Medication Sig Dispense Refill    albuterol (PROAIR HFA/PROVENTIL HFA/VENTOLIN HFA) 108 (90 Base) MCG/ACT inhaler Inhale 2 puffs into the lungs every 6 hours 18 g 0    alcohol swab prep pads Use to swab area of injection/yanira as directed. 100 each 3    blood glucose (NO BRAND SPECIFIED) lancets standard Use to test blood sugar 1 times daily or as directed. 100 lancet 1    blood glucose (NO BRAND  SPECIFIED) test strip Use to test blood sugar 1 times daily or as directed. To accompany: Blood Glucose Monitor Brands: per insurance. 100 strip 6    Blood Glucose Calibration (ACCU-CHEK GUIDE CONTROL) LIQD USE AS DIRECTED WITH MONITOR      blood glucose calibration (NO BRAND SPECIFIED) solution To accompany: Blood Glucose Monitor Brands: per insurance. 1 each 1    blood glucose monitoring (NO BRAND SPECIFIED) meter device kit Use to test blood sugar 1 times daily or as directed. Preferred blood glucose meter OR supplies to accompany: 1 kit 0    blood glucose monitoring (ONE TOUCH ULTRA 2) meter device kit PLEASE SEE ATTACHED FOR DETAILED DIRECTIONS      Insulin Glargine-yfgn (SEMGLEE, YFGN,) 100 UNIT/ML SOLN Inject 16 Units Subcutaneous At Bedtime 20 mL 1    Insulin Glargine-yfgn (SEMGLEE, YFGN,) 100 UNIT/ML SOPN Inject 16 Units Subcutaneous At Bedtime 15 mL 1    insulin pen needle (32G X 4 MM) 32G X 4 MM miscellaneous Use 1 pen needles daily or as directed. 100 each 1    lisinopril (ZESTRIL) 10 MG tablet TAKE 1 TABLET BY MOUTH DAILY 90 tablet 2    metFORMIN (GLUCOPHAGE XR) 500 MG 24 hr tablet TAKE 2 TABLETS BY MOUTH TWICE DAILY WITH MEALS 360 tablet 2    naproxen sodium 220 MG capsule Take 220 mg by mouth 2 times daily (with meals) (Patient not taking: Reported on 7/15/2022)      omeprazole (PRILOSEC) 40 MG DR capsule TAKE 1 CAPSULE BY MOUTH DAILY 90 capsule 2    sildenafil (VIAGRA) 100 MG tablet Take 1 tablet (100 mg) by mouth daily as needed 30 min to 4 hrs before sex. Do not use with nitroglycerin, terazosin or doxazosin. 6 tablet 1    thin (NO BRAND SPECIFIED) lancets Use with lanceting device. To accompany: Blood Glucose Monitor Brands: per insurance. 50 each 6          Review Of Systems  Skin: negative  Eyes: negative  Ears/Nose/Throat: negative  Respiratory: No shortness of breath, dyspnea on exertion, cough, or hemoptysis    O: Physical Exam:  Pain to palpation medial and lateral joint line as well as  parapatellar.  CMS intact to both lower extremities.  Adequate knee flexion ne extension.  No evidence instability.    Lab:re order inflammatory markers/arthritic profile  HgbA1C 9.2    Images:  Narrative & Impression   KNEE BILATERAL 3 VIEWS  DATE/TIME: 10/25/2022 4:18 PM      INDICATION: Bilateral knee pain.   COMPARISON: 8/20/2021.                                                                      IMPRESSION:  1.  Right knee: Moderate-advanced degenerative arthrosis. Moderate  medial compartment narrowing. Medial compartment subchondral  sclerosis. Mild tricompartmental marginal osteophytosis. No fracture  or joint effusion. Prominent varix in the medial leg soft tissues.  2.  Left knee: Moderate left knee degenerative arthrosis, stable.  Moderate medial compartment narrowing. Minimal patellofemoral  compartment marginal osteophytosis. No fracture or joint effusion.            A:  Bilateral Knee OA    P:  Inject bilateral knee joints after verbal and written consent, ethyl chloride/chloroprep.  Follow outcomes  Discussed viscosupplementation.  See back 3-6 mos  Notify if exacerbation symptoms.           In addition to the above assessment and plan each active problem on Clarence's problem list was evaluated today. This included the questioning of Clarence for any medication problems. We will continue the current treatment plan for these active problems except as noted.    Large Joint Injection/Arthocentesis: bilateral knee    Date/Time: 7/31/2023 4:48 PM    Performed by: Elia Reddy MD  Authorized by: Elia Reddy MD    Needle Size:  22 G  Location:  Knee  Laterality:  Bilateral      Medications (Right):  40 mg triamcinolone 40 MG/ML; 3 mL bupivacaine HCl (PF) 0.25 %; 3 mL lidocaine 1 %  Medications (Left):  40 mg triamcinolone 40 MG/ML; 3 mL bupivacaine HCl (PF) 0.25 %; 3 mL lidocaine 1 %  Outcome:  Tolerated well, no immediate complications  Procedure discussed: discussed risks, benefits, and alternatives     Consent Given by:  Patient  Timeout: timeout called immediately prior to procedure    Prep: patient was prepped and draped in usual sterile fashion      I was present entire visit and I performed injections.  Elia Reddy MD

## 2023-07-31 NOTE — PATIENT INSTRUCTIONS
Thank you for choosing [facility]    Dr. Reddy Locations:    [facility] [address], [address]  [place] [id number] [place] [address]   Appointments: [phone] Appointments: [phone]   Fax: [phone] Fax: [phone]       Follow up: We will call with lab results.     Lab orders have been placed for you. You can go to your [facility] location that has a lab. Please call your [facility] to schedule a lab appointment. [facility] has a walk-in hospital lab for your convenience.

## 2023-08-01 RX ORDER — LIDOCAINE HYDROCHLORIDE 10 MG/ML
3 INJECTION, SOLUTION INFILTRATION; PERINEURAL
Status: DISCONTINUED | OUTPATIENT
Start: 2023-07-31 | End: 2024-09-25

## 2023-08-01 RX ORDER — BUPIVACAINE HYDROCHLORIDE 2.5 MG/ML
3 INJECTION, SOLUTION EPIDURAL; INFILTRATION; INTRACAUDAL
Status: DISCONTINUED | OUTPATIENT
Start: 2023-07-31 | End: 2024-09-25

## 2023-08-01 RX ORDER — TRIAMCINOLONE ACETONIDE 40 MG/ML
40 INJECTION, SUSPENSION INTRA-ARTICULAR; INTRAMUSCULAR
Status: DISCONTINUED | OUTPATIENT
Start: 2023-07-31 | End: 2024-09-25

## 2023-08-11 ENCOUNTER — TELEPHONE (OUTPATIENT)
Dept: FAMILY MEDICINE | Facility: CLINIC | Age: 63
End: 2023-08-11

## 2023-08-11 NOTE — CONFIDENTIAL NOTE
Patient Quality Outreach    Patient is due for the following:   Diabetes -  A1C and Eye Exam  Colon Cancer Screening  Physical Preventive Adult Physical    Next Steps:   Schedule a Adult Preventative    Type of outreach:    Sent [employer] message.      Questions for provider review:    None           Derek Shukla MA

## 2023-08-11 NOTE — LETTER
[facility]  [address] [facility] [address] [phone]       November 27, 2023    Clarence Nam [place]  [address] [address] [facility] [address]    Dear Clarence,    We care about your health and have reviewed your health plan and are making recommendations based on this review, to optimize your health.    You are in particular need of attention regarding:  -Diabetes  -Colon Cancer Screening  -Wellness (Physical) Visit     We are recommending that you:  -schedule a WELLNESS (Physical) APPOINTMENT with me.   I will check fasting labs the same day - nothing to eat except water and meds for 8-10 hours prior.    -schedule a COLONOSCOPY to look for colon cancer (due every 10 years or 5 years in higher risk situations.)        Colon cancer is now the second leading cause of cancer-related deaths in the [place] for both men and women and there are over 130,000 new cases and 50,000 deaths per year from colon cancer.  Colonoscopies can prevent 90-95% of these deaths.  Problem lesions can be removed before they ever become cancer.  This test is not only looking for cancer, but also getting rid of precancerious lesions.    If you are under/uninsured, we recommend you contact the [employer] program. [facility] [employer] is a free colorectal cancer screening program that provides colonoscopies for eligible under/uninsured [place] men and women. If you are interested in receiving a free colonoscopy, please call [facility] at [phone] (mention code [employer]) to see if you re eligible.      If you do not wish to do a colonoscopy or cannot afford to do one, at this time, there is another option. It is called a FIT test or Fecal Immunochemical Occult Blood Test (take home stool sample kit).  It does not replace the colonoscopy for colorectal cancer screening, but it can detect hidden bleeding in the lower colon.  It does need to be repeated every year and if a positive result is  obtained, you would be referred for a colonoscopy.          If you have completed either one of these tests at another facility, please call with the details of when and where the tests were done and if they were normal or not. Or have the records sent to our clinic so that we can best coordinate your care.    -Diabetic Eye Exam is due.  If this was done within the last 12 months then please contact the clinic with the date and location so we can update your records.    In addition, here is a list of due or overdue Health Maintenance reminders.    Health Maintenance Due   Topic Date Due    Diabetic Foot Exam  Never done    Eye Exam  Never done    Pneumococcal Vaccine (1 - PCV) Never done    Colorectal Cancer Screening  Never done    Zoster (Shingles) Vaccine (1 of 2) Never done    RSV VACCINE (Pregnancy & 60+) (1 - 1-dose 60+ series) Never done    ANNUAL REVIEW OF HM ORDERS  11/08/2022    Cholesterol Lab  11/18/2022    Yearly Preventive Visit  12/07/2022    Basic Metabolic Panel  07/15/2023    Kidney Microalbumin Urine Test  08/23/2023       To address the above recommendations, we encourage you to contact us at [phone], via [email or web address] or by contacting Central Scheduling toll free at [phone] 24 hours a day. They will assist you with finding the most convenient time and location.    Thank you for trusting [facility] and we appreciate the opportunity to serve you.  We look forward to supporting your healthcare needs in the future.    Healthy Regards,    Your [facility] Team

## 2023-08-11 NOTE — LETTER
[facility]  [address] [facility] [address] [phone]       August 28, 2023    Clarence Nam [place]  [address] [facility] [facility] [address]    Dear Clarence,    We care about your health and have reviewed your health plan and are making recommendations based on this review, to optimize your health.    You are in particular need of attention regarding:  -Diabetes  -Colon Cancer Screening  -Wellness (Physical) Visit     We are recommending that you:  -schedule a WELLNESS (Physical) APPOINTMENT with me.   I will check fasting labs the same day - nothing to eat except water and meds for 8-10 hours prior.    -schedule a COLONOSCOPY to look for colon cancer (due every 10 years or 5 years in higher risk situations.)        Colon cancer is now the second leading cause of cancer-related deaths in the [place] for both men and women and there are over 130,000 new cases and 50,000 deaths per year from colon cancer.  Colonoscopies can prevent 90-95% of these deaths.  Problem lesions can be removed before they ever become cancer.  This test is not only looking for cancer, but also getting rid of precancerious lesions.    If you are under/uninsured, we recommend you contact the [employer] program. [employer] is a free colorectal cancer screening program that provides colonoscopies for eligible under/uninsured [place] men and women. If you are interested in receiving a free colonoscopy, please call [employer] at [phone] (mention code [employer]) to see if you re eligible.      If you do not wish to do a colonoscopy or cannot afford to do one, at this time, there is another option. It is called a FIT test or Fecal Immunochemical Occult Blood Test (take home stool sample kit).  It does not replace the colonoscopy for colorectal cancer screening, but it can detect hidden bleeding in the lower colon.  It does need to be repeated every year and if a positive result is obtained,  you would be referred for a colonoscopy.          If you have completed either one of these tests at another facility, please call with the details of when and where the tests were done and if they were normal or not. Or have the records sent to our clinic so that we can best coordinate your care.    -Diabetic Eye Exam is due.  If this was done within the last 12 months then please contact the clinic with the date and location so we can update your records.    In addition, here is a list of due or overdue Health Maintenance reminders.    Health Maintenance Due   Topic Date Due    Diabetic Foot Exam  Never done    Eye Exam  Never done    Pneumococcal Vaccine (1 - PCV) Never done    Colorectal Cancer Screening  Never done    Zoster (Shingles) Vaccine (1 of 2) Never done    ANNUAL REVIEW OF HM ORDERS  11/08/2022    Cholesterol Lab  11/18/2022    A1C Lab  11/23/2022    Yearly Preventive Visit  12/07/2022    PHQ-2 (once per calendar year)  01/01/2023    Basic Metabolic Panel  07/15/2023    Kidney Microalbumin Urine Test  08/23/2023    Flu Vaccine (1) 09/01/2023       To address the above recommendations, we encourage you to contact us at [phone], via [place]  or by contacting Central Scheduling toll free at [phone] 24 hours a day. They will assist you with finding the most convenient time and location.    Thank you for trusting [facility] and we appreciate the opportunity to serve you.  We look forward to supporting your healthcare needs in the future.    Healthy Regards,    Your [facility] Team

## 2023-08-28 NOTE — CONFIDENTIAL NOTE
Patient Quality Outreach    Patient is due for the following:   Diabetes -  A1C and Eye Exam  Colon Cancer Screening  Physical Preventive Adult Physical    Next Steps:   Schedule a Adult Preventative    Type of outreach:    Sent letter.    Next Steps:  Reach out within 90 days via Letter.    Max number of attempts reached: Yes. Will try again in 90 days if patient still on fail list.    Questions for provider review:    None           Derek Shukla MA

## 2023-09-02 ENCOUNTER — HEALTH MAINTENANCE LETTER (OUTPATIENT)
Age: 63
End: 2023-09-02

## 2023-10-31 ENCOUNTER — OFFICE VISIT (OUTPATIENT)
Dept: ORTHOPEDICS | Facility: CLINIC | Age: 63
End: 2023-10-31
Payer: COMMERCIAL

## 2023-10-31 ENCOUNTER — LAB (OUTPATIENT)
Dept: LAB | Facility: CLINIC | Age: 63
End: 2023-10-31
Payer: COMMERCIAL

## 2023-10-31 VITALS
DIASTOLIC BLOOD PRESSURE: 89 MMHG | HEART RATE: 76 BPM | SYSTOLIC BLOOD PRESSURE: 121 MMHG | BODY MASS INDEX: 29.99 KG/M2 | WEIGHT: 215 LBS

## 2023-10-31 DIAGNOSIS — M17.0 PRIMARY OSTEOARTHRITIS OF BOTH KNEES: Primary | ICD-10-CM

## 2023-10-31 DIAGNOSIS — E11.65 TYPE 2 DIABETES MELLITUS WITH HYPERGLYCEMIA, WITHOUT LONG-TERM CURRENT USE OF INSULIN (H): ICD-10-CM

## 2023-10-31 LAB — HBA1C MFR BLD: 6.7 % (ref 0–5.6)

## 2023-10-31 PROCEDURE — 99204 OFFICE O/P NEW MOD 45 MIN: CPT | Performed by: STUDENT IN AN ORGANIZED HEALTH CARE EDUCATION/TRAINING PROGRAM

## 2023-10-31 PROCEDURE — 83036 HEMOGLOBIN GLYCOSYLATED A1C: CPT

## 2023-10-31 PROCEDURE — 36415 COLL VENOUS BLD VENIPUNCTURE: CPT

## 2023-10-31 NOTE — LETTER
10/31/2023         RE: Clarence Tai  [address] [address] [facility] [address]        Dear Colleague,    Thank you for referring your patient, Clarence Tai, to the [facility] [place]. Please see a copy of my visit note below.    ASSESSMENT & PLAN    Clarence was seen today for pain and pain.    Diagnoses and all orders for this visit:    Primary osteoarthritis of both knees  -     Physical Therapy Referral; Future  -     Physical Therapy Referral; Future    Type 2 diabetes mellitus with hyperglycemia, without long-term current use of insulin (H)  -     Hemoglobin A1c; Future      This issue is chronic and generally worsening. Clarence presents to clinic today for bilateral knee pain, right worse than left due to osteoarthritis of the knee which is most severe in the medial compartment on both sides.  He has had multiple steroid injections, HA injection, and has done PT in the past.  However he has continued to do his home exercise program. His previous radiographs from last year were reviewed and show osteoarthritis that is most severe in the medial compartment, but is still well preserved in the lateral patellofemoral compartments.  We discussed the variety of treatment options including repeat steroid injection, HA injections, and possible PRP injection.  I think it is reasonable for a last trial of steroid injection under ultrasound guidance with the reinitiation of his physical therapy to assess, we did discuss the need for patient a new A1c lab reading to be completed beforehand in order to make sure that it is safe to.  In addition, we will trial a medial  brace, and see if this combination of treatment options can provide relief.  If no improvement this, would repeat HA injections, and then progressed to discussing PRP versus surgical referral.  Plan as follows  -Physical therapy referral placed today  -Medial  brace for bilateral knees ordered  today  -Patient will return to clinic on Friday for procedure only for steroid injection to knee  -He will follow-up in 4 to 6 weeks after procedure    DO CARLOS [facility] [place]    -----  Chief Complaint   Patient presents with     Left Knee - Pain     Right Knee - Pain       SUBJECTIVE  Clarence Tai is a/an 63 year old male who is seen as a self referral and former patient of Dr. Reddy for evaluation of bilateral knee pain and osteoarthritis.     The patient is seen with their wife.    Onset: 2019. Reports insidious onset without acute precipitating event.  Location of Pain: bilateral knees,  Worsened by: stairs, weight bearing, sit to stand transition, standing for prolonged time, occasional pain at nighttime  Better with: cortisone helped first 2 injections very well  Treatments tried: Aleve, physical therapy (x6 weeks), corticosteroid injection (most recent date: 7/31/2023) that provided 2 month(s) of relief, and viscosupplementation injection series (most recent date: 10/2021)  Associated symptoms: no distal numbness or tingling; denies swelling or warmth, pain in patellar tendon area     Pt reports walking his dog and is hoping to walk her without complication.    Pt reports becoming diabetic from the recent cortisone injections and had his A1C measured at ~14.    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      REVIEW OF SYSTEMS:  Review of systems negative unless mentioned in HPI     OBJECTIVE:  /89   Pulse 76   Wt 97.5 kg (215 lb)   BMI 29.99 kg/m     General: healthy, alert and in no distress  Skin: no suspicious lesions or rash.  CV: distal perfusion intact   Resp: normal respiratory effort without conversational dyspnea   Psych: normal mood and affect  Gait: antalgic  Neuro: Normal light sensory exam of RL, LL extremity     Bilateral Knee exam  Gait: Normal  Alignment:  [x] Normal  [] Anatomic  valgus  [] Anatomic varus  Inspection: [x] Normal  [] Ecchymosis present []Other   Palpation:  Joint Tenderness: [x] No Tenderness  [] MJL [] LJL [] MCL Margin [] LCL Margin [] Pes Anserine [] Distal Quadricep  [] Other   Peripatellar Tenderness: [x] None [] Lateral pole [] Medial pole [] Superior pole [] Inferior pole    Patellar tendon pain: [x] None [] Present    Patellar apprehension: [x] None [] Present    Patellar motion: [x] Normal [] Abnormal  Crepitus: [x] None [] Present  Effusion: [x] None [] Trace [] 1+ [] 2+ [] 3+  Range of motion:  Flexion: 135, Extension: 0  Strength:  [x] Full in all planes, including intact extensor mechanism [] Limited as described  Neurologic: Normal sensation   Vascular: Normal pulses   Special tests:   Lachman: Negative  Anterior Drawer: Negative  Sag/quad Activation: NP  Posterior Drawer: Negative  Valgus Stress: Negative at 0/30  Varus Stress: Negative at 0/30  Keysha's: Negative  Thessaly: NP     Other notable findings/comments: None       RADIOLOGY:  Final results and radiologist's interpretation, available in the [facility] health record.  Images were reviewed with the patient in the office today.  My personal interpretation of the performed imaging: Radiographs from 10/25/2022 show moderate to advanced medial compartment osteoarthritis of bilateral knees.  Mild to moderate arthritis of the lateral patellofemoral compartments bilaterally.      Again, thank you for allowing me to participate in the care of your patient.        Sincerely,        Matthew Barr DO

## 2023-10-31 NOTE — PROGRESS NOTES
ASSESSMENT & PLAN    Clarence was seen today for pain and pain.    Diagnoses and all orders for this visit:    Primary osteoarthritis of both knees  -     Physical Therapy Referral; Future  -     Physical Therapy Referral; Future    Type 2 diabetes mellitus with hyperglycemia, without long-term current use of insulin (H)  -     Hemoglobin A1c; Future      This issue is chronic and generally worsening. Clarence presents to clinic today for bilateral knee pain, right worse than left due to osteoarthritis of the knee which is most severe in the medial compartment on both sides.  He has had multiple steroid injections, HA injection, and has done PT in the past.  However he has continued to do his home exercise program. His previous radiographs from last year were reviewed and show osteoarthritis that is most severe in the medial compartment, but is still well preserved in the lateral and patellofemoral compartments.  We discussed the variety of treatment options including repeat steroid injection, HA injections, and possible PRP injection.  I think it is reasonable for a last trial of steroid injection under ultrasound guidance with the reinitiation of his physical therapy, we did discuss the need for patient a new A1c lab reading to be completed beforehand in order to make sure that it is safe to.  In addition, we will trial a medial  brace, and see if this combination of treatment options can provide relief.  If no improvement this, would repeat HA injections, and then progress to discussing PRP versus surgical referral.  Plan as follows  -Physical therapy referral placed today  -Medial  brace for bilateral knees ordered today  -Patient will return to clinic on Friday for procedure only for steroid injection to knee  -He will follow-up in 4 to 6 weeks after procedure    Matthew Barr DO  [facility] [place]    -----  Chief Complaint   Patient presents with    Left Knee -  Pain    Right Knee - Pain       SUBJECTIVE  Clarence Tai is a/an 63 year old male who is seen as a self referral and former patient of Dr. Reddy for evaluation of bilateral knee pain and osteoarthritis.     The patient is seen with their wife.    Onset: 2019. Reports insidious onset without acute precipitating event.  Location of Pain: bilateral knees,  Worsened by: stairs, weight bearing, sit to stand transition, standing for prolonged time, occasional pain at nighttime  Better with: cortisone helped first 2 injections very well  Treatments tried: Aleve, physical therapy (x6 weeks), corticosteroid injection (most recent date: 7/31/2023) that provided 2 month(s) of relief, and viscosupplementation injection series (most recent date: 10/2021)  Associated symptoms: no distal numbness or tingling; denies swelling or warmth, pain in patellar tendon area     Pt reports walking his dog and is hoping to walk her without complication.    Pt reports becoming diabetic from the recent cortisone injections and had his A1C measured at ~14.    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      REVIEW OF SYSTEMS:  Review of systems negative unless mentioned in HPI     OBJECTIVE:  /89   Pulse 76   Wt 97.5 kg (215 lb)   BMI 29.99 kg/m     General: healthy, alert and in no distress  Skin: no suspicious lesions or rash.  CV: distal perfusion intact   Resp: normal respiratory effort without conversational dyspnea   Psych: normal mood and affect  Gait: antalgic  Neuro: Normal light sensory exam of RL, LL extremity     Bilateral Knee exam  Gait: Normal  Alignment:  [x] Normal  [] Anatomic valgus  [] Anatomic varus  Inspection: [x] Normal  [] Ecchymosis present []Other   Palpation:  Joint Tenderness: [x] No Tenderness  [] MJL [] LJL [] MCL Margin [] LCL Margin [] Pes Anserine [] Distal Quadricep  [] Other   Peripatellar Tenderness: [x] None [] Lateral pole [] Medial pole []  Superior pole [] Inferior pole    Patellar tendon pain: [x] None [] Present    Patellar apprehension: [x] None [] Present    Patellar motion: [x] Normal [] Abnormal  Crepitus: [x] None [] Present  Effusion: [x] None [] Trace [] 1+ [] 2+ [] 3+  Range of motion:  Flexion: 135, Extension: 0  Strength:  [x] Full in all planes, including intact extensor mechanism [] Limited as described  Neurologic: Normal sensation   Vascular: Normal pulses   Special tests:   Lachman: Negative  Anterior Drawer: Negative  Sag/quad Activation: NP  Posterior Drawer: Negative  Valgus Stress: Negative at 0/30  Varus Stress: Negative at 0/30  Keysha's: Negative  Thessaly: NP     Other notable findings/comments: None       RADIOLOGY:  Final results and radiologist's interpretation, available in the [facility] health record.  Images were reviewed with the patient in the office today.  My personal interpretation of the performed imaging: Radiographs from 10/25/2022 show moderate to advanced medial compartment osteoarthritis of bilateral knees.  Mild to moderate arthritis of the lateral patellofemoral compartments bilaterally.

## 2023-11-03 ENCOUNTER — OFFICE VISIT (OUTPATIENT)
Dept: ORTHOPEDICS | Facility: CLINIC | Age: 63
End: 2023-11-03
Payer: COMMERCIAL

## 2023-11-03 VITALS — SYSTOLIC BLOOD PRESSURE: 132 MMHG | DIASTOLIC BLOOD PRESSURE: 82 MMHG

## 2023-11-03 DIAGNOSIS — M17.12 PRIMARY OSTEOARTHRITIS OF LEFT KNEE: ICD-10-CM

## 2023-11-03 DIAGNOSIS — M17.11 PRIMARY OSTEOARTHRITIS OF RIGHT KNEE: Primary | ICD-10-CM

## 2023-11-03 PROCEDURE — 20611 DRAIN/INJ JOINT/BURSA W/US: CPT | Mod: 50 | Performed by: STUDENT IN AN ORGANIZED HEALTH CARE EDUCATION/TRAINING PROGRAM

## 2023-11-03 RX ORDER — LIDOCAINE HYDROCHLORIDE 10 MG/ML
3 INJECTION, SOLUTION INFILTRATION; PERINEURAL
Status: DISCONTINUED | OUTPATIENT
Start: 2023-11-03 | End: 2024-09-25

## 2023-11-03 RX ORDER — LIDOCAINE HYDROCHLORIDE 10 MG/ML
2 INJECTION, SOLUTION INFILTRATION; PERINEURAL
Status: DISCONTINUED | OUTPATIENT
Start: 2023-11-03 | End: 2024-09-25

## 2023-11-03 RX ORDER — BETAMETHASONE SODIUM PHOSPHATE AND BETAMETHASONE ACETATE 3; 3 MG/ML; MG/ML
6 INJECTION, SUSPENSION INTRA-ARTICULAR; INTRALESIONAL; INTRAMUSCULAR; SOFT TISSUE
Status: DISCONTINUED | OUTPATIENT
Start: 2023-11-03 | End: 2024-09-25

## 2023-11-03 RX ORDER — ROPIVACAINE HYDROCHLORIDE 5 MG/ML
2 INJECTION, SOLUTION EPIDURAL; INFILTRATION; PERINEURAL
Status: DISCONTINUED | OUTPATIENT
Start: 2023-11-03 | End: 2024-09-25

## 2023-11-03 RX ADMIN — BETAMETHASONE SODIUM PHOSPHATE AND BETAMETHASONE ACETATE 6 MG: 3; 3 INJECTION, SUSPENSION INTRA-ARTICULAR; INTRALESIONAL; INTRAMUSCULAR; SOFT TISSUE at 16:29

## 2023-11-03 RX ADMIN — LIDOCAINE HYDROCHLORIDE 2 ML: 10 INJECTION, SOLUTION INFILTRATION; PERINEURAL at 16:29

## 2023-11-03 RX ADMIN — LIDOCAINE HYDROCHLORIDE 3 ML: 10 INJECTION, SOLUTION INFILTRATION; PERINEURAL at 16:29

## 2023-11-03 RX ADMIN — ROPIVACAINE HYDROCHLORIDE 2 ML: 5 INJECTION, SOLUTION EPIDURAL; INFILTRATION; PERINEURAL at 16:29

## 2023-11-03 NOTE — LETTER
11/3/2023         RE: Clarence Tai  [address] [address] [facility] [address] [facility] [address]        Dear Colleague,    Thank you for referring your patient, Clarence Tai, to the [facility] [place]. Please see a copy of my visit note below.    Sports Medicine Procedure Note    Clarence was seen today for pain and pain.    Diagnoses and all orders for this visit:    Primary osteoarthritis of right knee  -     Large Joint Injection: bilateral knee    Primary osteoarthritis of left knee  -     Large Joint Injection: bilateral knee      Clarence Tai is a/an 63 year old male who is seen for Corticosteroid injection of bilateral knees.   Last injection 7/31/23. A1c 6.7%    -Follow up with PT as scheduled  -Follow up in our clinic in 4-6 weeks   -Post-injection instructions were provided to the patient    Return in about 4 weeks (around 12/1/2023), or if symptoms worsen or fail to improve.      Post-Injection Discharge Instructions    You may shower, however avoid swimming, tub baths or hot tubs for 24 hours following your procedure  You may have a mild to moderate increase in pain for a few days following the injection.  The lidocaine (local numbing medicine) will wear off in several hours. It usually takes 3-5 days for the steroid medication to start working although it may take up to 14 days for full effect.   You may use ice packs for 10-15 minutes, 3 to 4 times a day at the injection site for comfort if needed  You may use extra strength Tylenol for pain control if necessary   If you were fasting, you may resume your normal diet and medications after the procedure  If you have diabetes, your blood sugar may be higher than normal for 10-14 days following a steroid injection. Contact your doctor who manages your diabetes if your blood sugar is significantly higher than usual    If you experience any of the following, call Sports Medicine @ [phone] or [phone]  -Fever over 100 degrees  F  -Swelling, bleeding, redness, drainage, warmth at the injection site  -New or significant worsening pain    Large Joint Injection: bilateral knee    Date/Time: 11/3/2023 4:29 PM    Performed by: Matthew Barr DO  Authorized by: Matthew Barr DO    Indications:  Pain and osteoarthritis  Needle Size:  25 G  Guidance: ultrasound    Approach:  Anterolateral  Location:  Knee  Laterality:  Bilateral      Medications (Right):  6 mg betamethasone acet & sod phos 6 (3-3) MG/ML; 2 mL lidocaine 1 %; 3 mL lidocaine 1 %; 2 mL ROPivacaine 5 MG/ML  Medications (Left):  6 mg betamethasone acet & sod phos 6 (3-3) MG/ML; 2 mL lidocaine 1 %; 3 mL lidocaine 1 %; 2 mL ROPivacaine 5 MG/ML  Outcome:  Tolerated well, no immediate complications  Procedure discussed: discussed risks, benefits, and alternatives    Consent Given by:  Patient  Timeout: timeout called immediately prior to procedure    Prep: patient was prepped and draped in usual sterile fashion     Ultrasound was used to ensure safe and accurate needle placement and injection. Ultrasound images of the procedure were permanently stored.  1ml of 8.4% Sodium Bicarbonate solution was used to buffer the local numbing agent for today's injection          Dr. UMER Barr DO [facility]  Sports Medicine     -----        Again, thank you for allowing me to participate in the care of your patient.        Sincerely,        Matthew Barr DO

## 2023-11-03 NOTE — PROGRESS NOTES
Sports Medicine Procedure Note    Clarence was seen today for pain and pain.    Diagnoses and all orders for this visit:    Primary osteoarthritis of right knee  -     Large Joint Injection: bilateral knee    Primary osteoarthritis of left knee  -     Large Joint Injection: bilateral knee      Clarence Tai is a/an 63 year old male who is seen for Corticosteroid injection of bilateral knees.   Last injection 7/31/23. A1c 6.7%    -Follow up with PT as scheduled  -Follow up in our clinic in 4-6 weeks   -Post-injection instructions were provided to the patient    Return in about 4 weeks (around 12/1/2023), or if symptoms worsen or fail to improve.      Post-Injection Discharge Instructions    You may shower, however avoid swimming, tub baths or hot tubs for 24 hours following your procedure  You may have a mild to moderate increase in pain for a few days following the injection.  The lidocaine (local numbing medicine) will wear off in several hours. It usually takes 3-5 days for the steroid medication to start working although it may take up to 14 days for full effect.   You may use ice packs for 10-15 minutes, 3 to 4 times a day at the injection site for comfort if needed  You may use extra strength Tylenol for pain control if necessary   If you were fasting, you may resume your normal diet and medications after the procedure  If you have diabetes, your blood sugar may be higher than normal for 10-14 days following a steroid injection. Contact your doctor who manages your diabetes if your blood sugar is significantly higher than usual    If you experience any of the following, call Sports Medicine @ [phone] or [phone]  -Fever over 100 degrees F  -Swelling, bleeding, redness, drainage, warmth at the injection site  -New or significant worsening pain    Large Joint Injection: bilateral knee    Date/Time: 11/3/2023 4:29 PM    Performed by: Matthew Barr DO  Authorized by: Matthew Barr DO    Indications:   Pain and osteoarthritis  Needle Size:  25 G  Guidance: ultrasound    Approach:  Anterolateral  Location:  Knee  Laterality:  Bilateral      Medications (Right):  6 mg betamethasone acet & sod phos 6 (3-3) MG/ML; 2 mL lidocaine 1 %; 3 mL lidocaine 1 %; 2 mL ROPivacaine 5 MG/ML  Medications (Left):  6 mg betamethasone acet & sod phos 6 (3-3) MG/ML; 2 mL lidocaine 1 %; 3 mL lidocaine 1 %; 2 mL ROPivacaine 5 MG/ML  Outcome:  Tolerated well, no immediate complications  Procedure discussed: discussed risks, benefits, and alternatives    Consent Given by:  Patient  Timeout: timeout called immediately prior to procedure    Prep: patient was prepped and draped in usual sterile fashion     Ultrasound was used to ensure safe and accurate needle placement and injection. Ultrasound images of the procedure were permanently stored.  1ml of 8.4% Sodium Bicarbonate solution was used to buffer the local numbing agent for today's injection          Dr. UMER Barr DO CAQ  [facility]  Sports Medicine     -----

## 2023-11-14 ENCOUNTER — THERAPY VISIT (OUTPATIENT)
Dept: PHYSICAL THERAPY | Facility: CLINIC | Age: 63
End: 2023-11-14
Attending: STUDENT IN AN ORGANIZED HEALTH CARE EDUCATION/TRAINING PROGRAM
Payer: COMMERCIAL

## 2023-11-14 DIAGNOSIS — M17.0 PRIMARY OSTEOARTHRITIS OF BOTH KNEES: ICD-10-CM

## 2023-11-14 PROCEDURE — 97161 PT EVAL LOW COMPLEX 20 MIN: CPT | Mod: GP | Performed by: PHYSICAL THERAPIST

## 2023-11-14 PROCEDURE — 97110 THERAPEUTIC EXERCISES: CPT | Mod: GP | Performed by: PHYSICAL THERAPIST

## 2023-11-14 NOTE — PROGRESS NOTES
Ossur Knee  Brace Trial:     Pain Rating    Affected Joint: Right Knee    Without  brace:    At rest 0/10  Walking: 3/10    Squattin/10   Stairs: 4/10      Wearing  brace:    At rest 0/10  Walkin/10    Squatting: 3/10   Stairs: 0/10      Brace and Measurements:    Brace trialed:     Type - [place]  One  Size - Medium  Side - Medial  Affected Knee - Right Knee    + valgus test on R and L knee (laxity noted bilaterally)    Other Comments:  See PT evaluation in Epic for any additional information including joint special testing/ligament testing

## 2023-11-14 NOTE — PROGRESS NOTES
PHYSICAL THERAPY EVALUATION  Type of Visit: Evaluation    See electronic medical record for Abuse and Falls Screening details.    Subjective       Presenting condition or subjective complaint: Bilateral knee osteoarthiritis  Date of onset: 23    Relevant medical history:     Dates & types of surgery:      Prior diagnostic imaging/testing results: X-ray     Prior therapy history for the same diagnosis, illness or injury:          Living Environment  Social support:     Type of home: House; Multi-level   Stairs to enter the home: Yes 3 Is there a railing: No   Ramp:     Stairs inside the home: Yes 13 Is there a railing: Yes   Help at home:    Equipment owned:       Employment:   inventory control; computer work  Hobbies/Interests: hunting and fishing    Patient goals for therapy: walking,    Pain assessment: Pain present  Location: B knee (R>L)/Ratin/10 at worst     Objective   See  brace trial today    Assessment & Plan   CLINICAL IMPRESSIONS  Medical Diagnosis: bilateral knee osteoarthiritis    Treatment Diagnosis: bilateral knee osteoarthritis   Impression/Assessment: Patient is a 63 year old male with B knee OA complaints.  The following significant findings have been identified: Pain, Decreased ROM/flexibility, Decreased joint mobility, Decreased strength, Impaired balance, Decreased proprioception, Inflammation, Impaired gait, Impaired muscle performance, Decreased activity tolerance, and Instability. These impairments interfere with their ability to perform self care tasks, recreational activities, household chores, and community mobility as compared to previous level of function.     Clinical Decision Making (Complexity):  Clinical Presentation: Stable/Uncomplicated  Clinical Presentation Rationale: based on medical and personal factors listed in PT evaluation  Clinical Decision Making (Complexity): Low complexity    PLAN OF CARE  Treatment Interventions:  Interventions: Manual Therapy,  Neuromuscular Re-education, Therapeutic Activity, Therapeutic Exercise    Long Term Goals     PT Goal 1  Goal Identifier: Goal 1  Goal Description: Pt will be able to go up and down stairs, without a railing, painfree  Rationale: to maximize safety and independence within the home  Target Date: 02/06/24      Frequency of Treatment: 1X/week  Duration of Treatment: 12 weeks    Recommended Referrals to Other Professionals:  O &P for brace  Education Assessment:   Learner/Method: Patient;Pictures/Video    Risks and benefits of evaluation/treatment have been explained.   Patient/Family/caregiver agrees with Plan of Care.     Evaluation Time:             Signing Clinician: Daisha Castillo, PT

## 2023-11-24 ENCOUNTER — THERAPY VISIT (OUTPATIENT)
Dept: PHYSICAL THERAPY | Facility: CLINIC | Age: 63
End: 2023-11-24
Attending: STUDENT IN AN ORGANIZED HEALTH CARE EDUCATION/TRAINING PROGRAM
Payer: COMMERCIAL

## 2023-11-24 DIAGNOSIS — M17.0 PRIMARY OSTEOARTHRITIS OF BOTH KNEES: ICD-10-CM

## 2023-11-24 PROCEDURE — 97110 THERAPEUTIC EXERCISES: CPT | Mod: GP | Performed by: PHYSICAL THERAPIST

## 2023-11-27 NOTE — CONFIDENTIAL NOTE
Patient Quality Outreach    Patient is due for the following:   Diabetes -  Eye Exam  Colon Cancer Screening  Physical Preventive Adult Physical    Next Steps:   Schedule a Adult Preventative    Type of outreach:    Sent letter.      Questions for provider review:    None           Derek Shukla MA

## 2023-12-20 DIAGNOSIS — I10 BENIGN ESSENTIAL HYPERTENSION: ICD-10-CM

## 2023-12-21 RX ORDER — LISINOPRIL 10 MG/1
10 TABLET ORAL DAILY
Qty: 30 TABLET | Refills: 0 | Status: SHIPPED | OUTPATIENT
Start: 2023-12-21 | End: 2024-01-12

## 2023-12-21 NOTE — TELEPHONE ENCOUNTER
Sent [employer] message requesting a call back for an appt. Two more attempts will be made.    Jenny Wood  Lead

## 2023-12-21 NOTE — TELEPHONE ENCOUNTER
One month fill provided, pt will need f/u appt for ongoing refill.  Please call to schedule CPE.    Bharath Love MD

## 2024-01-01 ENCOUNTER — TRANSFERRED RECORDS (OUTPATIENT)
Dept: MULTI SPECIALTY CLINIC | Facility: CLINIC | Age: 64
End: 2024-01-01

## 2024-01-01 LAB — RETINOPATHY: NORMAL

## 2024-01-04 NOTE — PROGRESS NOTES
DISCHARGE SUMMARY    Clarencemarlena Browning Satishbright was seen   times for evaluation and treatment.  Patient did not return for further treatment and current status is unknown.  Due to short treatment duration, no objective or functional changes were made.  Please see goal flow sheet from episode noted date below and initial evaluation for further information.  Patient is discharged from therapy and therapy episode is resolved as of 01/04/24.      Linked Episodes   Type: Episode: Status: Noted: Resolved: Last update: Updated by:   PHYSICAL THERAPY B knee OA Active 11/14/2023 11/24/2023  2:45 PM Daisha Castillo, PT      Comments:

## 2024-01-12 ENCOUNTER — OFFICE VISIT (OUTPATIENT)
Dept: FAMILY MEDICINE | Facility: CLINIC | Age: 64
End: 2024-01-12
Payer: COMMERCIAL

## 2024-01-12 VITALS
HEART RATE: 75 BPM | HEIGHT: 71 IN | RESPIRATION RATE: 20 BRPM | WEIGHT: 201 LBS | SYSTOLIC BLOOD PRESSURE: 130 MMHG | TEMPERATURE: 97.9 F | BODY MASS INDEX: 28.14 KG/M2 | DIASTOLIC BLOOD PRESSURE: 86 MMHG | OXYGEN SATURATION: 97 %

## 2024-01-12 DIAGNOSIS — E11.65 TYPE 2 DIABETES MELLITUS WITH HYPERGLYCEMIA, WITHOUT LONG-TERM CURRENT USE OF INSULIN (H): ICD-10-CM

## 2024-01-12 DIAGNOSIS — Z00.00 ROUTINE GENERAL MEDICAL EXAMINATION AT A HEALTH CARE FACILITY: Primary | ICD-10-CM

## 2024-01-12 DIAGNOSIS — N52.02 CORPORO-VENOUS OCCLUSIVE ERECTILE DYSFUNCTION: ICD-10-CM

## 2024-01-12 DIAGNOSIS — I10 BENIGN ESSENTIAL HYPERTENSION: ICD-10-CM

## 2024-01-12 DIAGNOSIS — Z12.11 SCREEN FOR COLON CANCER: ICD-10-CM

## 2024-01-12 DIAGNOSIS — R06.83 SNORES: ICD-10-CM

## 2024-01-12 LAB — HBA1C MFR BLD: 6.9 % (ref 0–5.6)

## 2024-01-12 PROCEDURE — 83036 HEMOGLOBIN GLYCOSYLATED A1C: CPT | Performed by: FAMILY MEDICINE

## 2024-01-12 PROCEDURE — 80048 BASIC METABOLIC PNL TOTAL CA: CPT | Performed by: FAMILY MEDICINE

## 2024-01-12 PROCEDURE — 99396 PREV VISIT EST AGE 40-64: CPT | Performed by: FAMILY MEDICINE

## 2024-01-12 PROCEDURE — 36415 COLL VENOUS BLD VENIPUNCTURE: CPT | Performed by: FAMILY MEDICINE

## 2024-01-12 PROCEDURE — 82570 ASSAY OF URINE CREATININE: CPT | Performed by: FAMILY MEDICINE

## 2024-01-12 PROCEDURE — 80061 LIPID PANEL: CPT | Performed by: FAMILY MEDICINE

## 2024-01-12 PROCEDURE — 99214 OFFICE O/P EST MOD 30 MIN: CPT | Mod: 25 | Performed by: FAMILY MEDICINE

## 2024-01-12 PROCEDURE — 82043 UR ALBUMIN QUANTITATIVE: CPT | Performed by: FAMILY MEDICINE

## 2024-01-12 RX ORDER — SILDENAFIL 100 MG/1
100 TABLET, FILM COATED ORAL DAILY PRN
Qty: 6 TABLET | Refills: 1 | Status: SHIPPED | OUTPATIENT
Start: 2024-01-12

## 2024-01-12 RX ORDER — METFORMIN HCL 500 MG
1000 TABLET, EXTENDED RELEASE 24 HR ORAL 2 TIMES DAILY WITH MEALS
Qty: 360 TABLET | Refills: 2 | Status: SHIPPED | OUTPATIENT
Start: 2024-01-12 | End: 2024-04-16

## 2024-01-12 RX ORDER — LISINOPRIL 10 MG/1
10 TABLET ORAL DAILY
Qty: 90 TABLET | Refills: 1 | Status: SHIPPED | OUTPATIENT
Start: 2024-01-12 | End: 2024-07-10

## 2024-01-12 ASSESSMENT — ENCOUNTER SYMPTOMS
NERVOUS/ANXIOUS: 0
CONSTIPATION: 0
HEADACHES: 0
SHORTNESS OF BREATH: 0
WEAKNESS: 0
JOINT SWELLING: 0
COUGH: 0
DIZZINESS: 0
ARTHRALGIAS: 0
EYE PAIN: 0
DYSURIA: 0
ABDOMINAL PAIN: 0
DIARRHEA: 0
PALPITATIONS: 0
HEMATOCHEZIA: 0
HEMATURIA: 0
CHILLS: 0
NAUSEA: 0
FREQUENCY: 0
PARESTHESIAS: 0
MYALGIAS: 0
SORE THROAT: 0
HEARTBURN: 0
FEVER: 0

## 2024-01-12 ASSESSMENT — PAIN SCALES - GENERAL: PAINLEVEL: NO PAIN (0)

## 2024-01-12 NOTE — PROGRESS NOTES
"SUBJECTIVE:   Clarence is a 63 year old, presenting for the following:  Physical        1/12/2024     2:45 PM   Additional Questions   Roomed by Leena Almaguer VF       Needs med refills on omeprazole and lisinopril.     No additional concerns.    No vaccines.       Healthy Habits:     Getting at least 3 servings of Calcium per day:  NO    Bi-annual eye exam:  Yes    Dental care twice a year:  Yes    Sleep apnea or symptoms of sleep apnea:  Excessive snoring and Sleep apnea    Diet:  Regular (no restrictions)    Frequency of exercise:  2-3 days/week    Duration of exercise:  15-30 minutes    Taking medications regularly:  Yes    Medication side effects:  None    Additional concerns today:  No      Today's PHQ-2 Score:       1/12/2024     2:45 PM   PHQ-2 ( 1999 [employer])   Q1: Little interest or pleasure in doing things 0   Q2: Feeling down, depressed or hopeless 0   PHQ-2 Score 0   Q1: Little interest or pleasure in doing things Not at all   Q2: Feeling down, depressed or hopeless Not at all   PHQ-2 Score 0       Feeling well, although knees are really bothering him.  Thinking about getting knee replacement.      Otherwise feeling well.  No specific concerns. Has not really been following diet and admits he's been drinking more pop.    Snores badly - he and wife sleep apart because of this.      Social History     Tobacco Use    Smoking status: Never     Passive exposure: Yes    Smokeless tobacco: Never    Tobacco comments:     passive in home   Substance Use Topics    Alcohol use: Yes     Alcohol/week: 0.0 standard drinks of alcohol     Comment: 1 per week             1/12/2024     2:44 PM   Alcohol Use   Prescreen: >3 drinks/day or >7 drinks/week? No          No data to display                Last PSA: No results found for: \"PSA\"    Reviewed orders with patient. Reviewed health maintenance and updated orders accordingly - Yes  Lab work is in process  Labs reviewed in EPIC    Reviewed and updated as needed this visit by " "clinical staff   Tobacco  Allergies  Meds              Reviewed and updated as needed this visit by Provider                     Review of Systems   Constitutional:  Negative for chills and fever.   HENT:  Negative for congestion, ear pain, hearing loss and sore throat.    Eyes:  Negative for pain and visual disturbance.   Respiratory:  Negative for cough and shortness of breath.    Cardiovascular:  Negative for chest pain, palpitations and peripheral edema.   Gastrointestinal:  Negative for abdominal pain, constipation, diarrhea, heartburn, hematochezia and nausea.   Genitourinary:  Positive for impotence. Negative for dysuria, frequency, genital sores, hematuria, penile discharge and urgency.   Musculoskeletal:  Negative for arthralgias, joint swelling and myalgias.   Skin:  Negative for rash.   Neurological:  Negative for dizziness, weakness, headaches and paresthesias.   Psychiatric/Behavioral:  Negative for mood changes. The patient is not nervous/anxious.          OBJECTIVE:   /86 (BP Location: Right arm, Patient Position: Sitting, Cuff Size: Adult Large)   Pulse 75   Temp 97.9  F (36.6  C) (Oral)   Resp 20   Ht 1.803 m (5' 11\")   Wt 91.2 kg (201 lb)   SpO2 97%   BMI 28.03 kg/m      Physical Exam  Vitals and nursing note reviewed.   Constitutional:       Appearance: He is well-developed.   HENT:      Head: Normocephalic and atraumatic.      Right Ear: Tympanic membrane, ear canal and external ear normal.      Left Ear: Tympanic membrane, ear canal and external ear normal.   Eyes:      Pupils: Pupils are equal, round, and reactive to light.   Neck:      Thyroid: No thyromegaly.   Cardiovascular:      Rate and Rhythm: Normal rate and regular rhythm.      Heart sounds: Normal heart sounds.   Pulmonary:      Effort: Pulmonary effort is normal.      Breath sounds: Normal breath sounds.   Abdominal:      General: Bowel sounds are normal.      Palpations: Abdomen is soft. There is no mass. " "  Musculoskeletal:         General: Normal range of motion.   Lymphadenopathy:      Cervical: No cervical adenopathy.   Skin:     General: Skin is warm and dry.      Findings: No rash.   Neurological:      Mental Status: He is alert and oriented to person, place, and time.   Psychiatric:         Judgment: Judgment normal.           Diagnostic Test Results:  Labs reviewed in Epic    ASSESSMENT/PLAN:   (Z00.00) Routine general medical examination at a health care facility  (primary encounter diagnosis)  Comment:   Plan:     (E11.65) Type 2 diabetes mellitus with hyperglycemia, without long-term current use of insulin (H)  Comment: A1c OK but borderline high, will restart metformin  Plan: Lipid panel reflex to direct LDL Non-fasting,         Albumin Random Urine Quantitative with Creat         Ratio, Hemoglobin A1c, metFORMIN (GLUCOPHAGE         XR) 500 MG 24 hr tablet, OFFICE/OUTPT         VISIT,KONG,LEVL IV            (I10) Benign essential hypertension  Comment: BP looks good, refilling medication  Plan: BASIC METABOLIC PANEL, lisinopril (ZESTRIL) 10         MG tablet            (Z12.11) Screen for colon cancer  Comment:   Plan: Fecal colorectal cancer screen (FIT)            (N52.02) Corporo-venous occlusive erectile dysfunction  Comment:   Plan: sildenafil (VIAGRA) 100 MG tablet            (R06.83) Snores  Comment: sleeps apart from wife his snoring is so bad  Plan: Adult Sleep Eval & Management          Referral                  COUNSELING:   Reviewed preventive health counseling, as reflected in patient instructions      BMI:   Estimated body mass index is 28.03 kg/m  as calculated from the following:    Height as of this encounter: 1.803 m (5' 11\").    Weight as of this encounter: 91.2 kg (201 lb).   Weight management plan: Discussed healthy diet and exercise guidelines      He reports that he has never smoked. He has been exposed to tobacco smoke. He has never used smokeless tobacco.            Bharath " Pro Love MD  [facility]

## 2024-01-13 LAB
ANION GAP SERPL CALCULATED.3IONS-SCNC: 11 MMOL/L (ref 7–15)
BUN SERPL-MCNC: 13.9 MG/DL (ref 8–23)
CALCIUM SERPL-MCNC: 9.5 MG/DL (ref 8.8–10.2)
CHLORIDE SERPL-SCNC: 104 MMOL/L (ref 98–107)
CHOLEST SERPL-MCNC: 204 MG/DL
CREAT SERPL-MCNC: 1.03 MG/DL (ref 0.67–1.17)
CREAT UR-MCNC: 139 MG/DL
DEPRECATED HCO3 PLAS-SCNC: 25 MMOL/L (ref 22–29)
EGFRCR SERPLBLD CKD-EPI 2021: 83 ML/MIN/1.73M2
FASTING STATUS PATIENT QL REPORTED: NO
GLUCOSE SERPL-MCNC: 173 MG/DL (ref 70–99)
HDLC SERPL-MCNC: 34 MG/DL
LDLC SERPL CALC-MCNC: 130 MG/DL
MICROALBUMIN UR-MCNC: 13.4 MG/L
MICROALBUMIN/CREAT UR: 9.64 MG/G CR (ref 0–17)
NONHDLC SERPL-MCNC: 170 MG/DL
POTASSIUM SERPL-SCNC: 4.3 MMOL/L (ref 3.4–5.3)
SODIUM SERPL-SCNC: 140 MMOL/L (ref 135–145)
TRIGL SERPL-MCNC: 202 MG/DL

## 2024-01-17 ENCOUNTER — TELEPHONE (OUTPATIENT)
Dept: FAMILY MEDICINE | Facility: CLINIC | Age: 64
End: 2024-01-17
Payer: COMMERCIAL

## 2024-01-17 DIAGNOSIS — R05.9 COUGH: ICD-10-CM

## 2024-01-17 NOTE — TELEPHONE ENCOUNTER
A user error has taken place: encounter opened in error, closed for administrative reasons.    Lori Burdick MA

## 2024-01-17 NOTE — TELEPHONE ENCOUNTER
Patient spouse calls today. She says that they did not get the Omeprazole as one of the 3 meds that were discussed. What they did get was sildenafil. She says this was never discussed at the appointment they dont need it.     Asking for 3 mo supply.    Patient call back number [phone]    What they asked for was :  Lisinopril  Metformin   Omeprazole

## 2024-01-18 RX ORDER — OMEPRAZOLE 40 MG/1
40 CAPSULE, DELAYED RELEASE ORAL DAILY
Qty: 90 CAPSULE | Refills: 1 | Status: SHIPPED | OUTPATIENT
Start: 2024-01-18 | End: 2024-07-11

## 2024-04-16 ENCOUNTER — OFFICE VISIT (OUTPATIENT)
Dept: FAMILY MEDICINE | Facility: CLINIC | Age: 64
End: 2024-04-16
Attending: FAMILY MEDICINE
Payer: COMMERCIAL

## 2024-04-16 VITALS
DIASTOLIC BLOOD PRESSURE: 81 MMHG | BODY MASS INDEX: 28.56 KG/M2 | SYSTOLIC BLOOD PRESSURE: 112 MMHG | TEMPERATURE: 96.9 F | HEART RATE: 72 BPM | HEIGHT: 71 IN | RESPIRATION RATE: 16 BRPM | OXYGEN SATURATION: 99 % | WEIGHT: 204 LBS

## 2024-04-16 DIAGNOSIS — E11.65 TYPE 2 DIABETES MELLITUS WITH HYPERGLYCEMIA, WITHOUT LONG-TERM CURRENT USE OF INSULIN (H): Primary | ICD-10-CM

## 2024-04-16 DIAGNOSIS — R06.83 SNORES: ICD-10-CM

## 2024-04-16 DIAGNOSIS — E78.5 HYPERLIPIDEMIA LDL GOAL <100: ICD-10-CM

## 2024-04-16 LAB — HBA1C MFR BLD: 7.1 % (ref 0–5.6)

## 2024-04-16 PROCEDURE — 36415 COLL VENOUS BLD VENIPUNCTURE: CPT | Performed by: FAMILY MEDICINE

## 2024-04-16 PROCEDURE — 83036 HEMOGLOBIN GLYCOSYLATED A1C: CPT | Performed by: FAMILY MEDICINE

## 2024-04-16 PROCEDURE — 99214 OFFICE O/P EST MOD 30 MIN: CPT | Performed by: FAMILY MEDICINE

## 2024-04-16 RX ORDER — METFORMIN HCL 500 MG
1000 TABLET, EXTENDED RELEASE 24 HR ORAL 2 TIMES DAILY WITH MEALS
Qty: 360 TABLET | Refills: 2 | Status: SHIPPED | OUTPATIENT
Start: 2024-04-16

## 2024-04-16 RX ORDER — ATORVASTATIN CALCIUM 20 MG/1
20 TABLET, FILM COATED ORAL DAILY
Qty: 90 TABLET | Refills: 3 | Status: SHIPPED | OUTPATIENT
Start: 2024-04-16

## 2024-04-16 ASSESSMENT — PAIN SCALES - GENERAL: PAINLEVEL: MODERATE PAIN (5)

## 2024-04-16 NOTE — PROGRESS NOTES
Assessment and Plan    (E11.65) Type 2 diabetes mellitus with hyperglycemia, without long-term current use of insulin (H)  (primary encounter diagnosis)  Comment: restarting metformin  Plan: HEMOGLOBIN A1C, metFORMIN (GLUCOPHAGE XR) 500         MG 24 hr tablet            (E78.5) Hyperlipidemia LDL goal <100  Comment: advised to start statin and is willing to  Plan: atorvastatin (LIPITOR) 20 MG tablet            (R06.83) Snores  Comment:   Plan: Adult Sleep Eval & Management          Referral              [facility] in [facility]     Bharath Love MD      Guru Lima is a 64 year old, presenting for the following health issues:  Hypertension        4/16/2024     4:06 PM   Additional Questions   Roomed by connie iqbal   Accompanied by self         4/16/2024     4:06 PM   Patient Reported Additional Medications   Patient reports taking the following new medications na     HPI       Hypertension Follow-up    Do you check your blood pressure regularly outside of the clinic? Yes   Are you following a low salt diet? Yes  Are your blood pressures ever more than 140 on the top number (systolic) OR more   than 90 on the bottom number (diastolic), for example 140/90? No  How many servings of fruits and vegetables do you eat daily?  2-3  On average, how many sweetened beverages do you drink each day (Examples: soda, juice, sweet tea, etc.  Do NOT count diet or artificially sweetened beverages)?   1  How many days per week do you exercise enough to make your heart beat faster? 4  How many minutes a day do you exercise enough to make your heart beat faster? 20 - 29  How many days per week do you miss taking your medication? 0    Struggling with knee pain.      Has been trying to manage diabetes with diet, is not currently using metformin.  Is not able to do much with exercise d/t knee.      Wife c/o snoring, has woken himself up.  Does also have a deviated septum.        Objective    /81   Pulse 72   Temp 96.9  F (36.1  " C) (Oral)   Resp 16   Ht 1.803 m (5' 11\")   Wt 92.5 kg (204 lb)   SpO2 99%   BMI 28.45 kg/m    Body mass index is 28.45 kg/m .  Physical Exam  Vitals and nursing note reviewed.   HENT:      Head: Normocephalic.   Eyes:      Conjunctiva/sclera: Conjunctivae normal.   Cardiovascular:      Rate and Rhythm: Normal rate and regular rhythm.      Heart sounds: Normal heart sounds.   Pulmonary:      Effort: Pulmonary effort is normal.      Breath sounds: Normal breath sounds.   Skin:     General: Skin is warm and dry.   Neurological:      General: No focal deficit present.      Mental Status: He is alert and oriented to person, place, and time.   Psychiatric:         Mood and Affect: Mood normal.         Behavior: Behavior normal.              Signed Electronically by: Bharath Love MD    "

## 2024-06-03 ENCOUNTER — TELEPHONE (OUTPATIENT)
Dept: ORTHOPEDICS | Facility: CLINIC | Age: 64
End: 2024-06-03
Payer: COMMERCIAL

## 2024-06-03 NOTE — TELEPHONE ENCOUNTER
Patient needed to verify place of surgery for Dr. Reddy for insurance purposes. No surgery scheduled as of today but patient would like to meet with Dr. Reddy to discuss future TKA. Patient scheduled. Nothing further needed at this time.     Alicia Alba RN   [facility]

## 2024-06-03 NOTE — TELEPHONE ENCOUNTER
Hello,    I'm with [employer].  Ins is calling asking where Dr. Reddy would perform surgery.  Please call pt with this info at his cell.

## 2024-06-17 ENCOUNTER — OFFICE VISIT (OUTPATIENT)
Dept: ORTHOPEDICS | Facility: CLINIC | Age: 64
End: 2024-06-17
Payer: COMMERCIAL

## 2024-06-17 ENCOUNTER — ANCILLARY PROCEDURE (OUTPATIENT)
Dept: GENERAL RADIOLOGY | Facility: CLINIC | Age: 64
End: 2024-06-17
Attending: ORTHOPAEDIC SURGERY
Payer: COMMERCIAL

## 2024-06-17 VITALS
HEART RATE: 88 BPM | DIASTOLIC BLOOD PRESSURE: 83 MMHG | SYSTOLIC BLOOD PRESSURE: 121 MMHG | BODY MASS INDEX: 28.38 KG/M2 | TEMPERATURE: 97.2 F | WEIGHT: 203.5 LBS

## 2024-06-17 DIAGNOSIS — M17.0 PRIMARY OSTEOARTHRITIS OF BOTH KNEES: Primary | ICD-10-CM

## 2024-06-17 DIAGNOSIS — M17.0 PRIMARY OSTEOARTHRITIS OF BOTH KNEES: ICD-10-CM

## 2024-06-17 PROCEDURE — 99213 OFFICE O/P EST LOW 20 MIN: CPT | Performed by: ORTHOPAEDIC SURGERY

## 2024-06-17 PROCEDURE — 73564 X-RAY EXAM KNEE 4 OR MORE: CPT | Mod: TC | Performed by: RADIOLOGY

## 2024-06-17 ASSESSMENT — PAIN SCALES - GENERAL: PAINLEVEL: MILD PAIN (3)

## 2024-06-17 NOTE — PROGRESS NOTES
Knee arthroplasty book given along with pre-procedure soap.     Alicia Alba RN   [facility]

## 2024-06-17 NOTE — LETTER
6/17/2024      Clarence Browning [place]  [id number] [address] [address]      Dear Colleague,    Thank you for referring your patient, Clarence Tai, to the [facility]. Please see a copy of my visit note below.    S:  Patient last evaluated about a year ago.  Continues to have bilateral knee pain, R greater than L.  Injection helped transiently.  HgbA1C went up after previous injections.  Would like to discuss surgical intervention.         Patient Active Problem List   Diagnosis     Lumbago     CARDIOVASCULAR SCREENING; LDL GOAL LESS THAN 130     Superficial phlebitis of right leg     Varicose veins of legs     Nephrolithiasis     Benign essential hypertension     Primary osteoarthritis of both knees     Type 2 diabetes mellitus with hyperglycemia, without long-term current use of insulin (H)            Past Medical History:   Diagnosis Date     Nephrolithiasis      Plantar fascial fibromatosis             Past Surgical History:   Procedure Laterality Date     CARPAL TUNNEL RELEASE RT/LT Left 03/05/2020     HC LAP,INGUINAL HERNIA REPR,INITIAL      x2, each side     SURGICAL HISTORY OF -       Right elbow screw due to fracture     ZZC APPENDECTOMY  1972            Social History     Tobacco Use     Smoking status: Never     Passive exposure: Yes     Smokeless tobacco: Never     Tobacco comments:     passive in home   Substance Use Topics     Alcohol use: Yes     Alcohol/week: 0.0 standard drinks of alcohol     Comment: 1 per week            Family History   Adopted: Yes   Problem Relation Age of Onset     Unknown/Adopted Mother      Unknown/Adopted Father                Allergies   Allergen Reactions     Nkda [No Known Drug Allergy]             Current Outpatient Medications   Medication Sig Dispense Refill     atorvastatin (LIPITOR) 20 MG tablet Take 1 tablet (20 mg) by mouth daily 90 tablet 3     lisinopril (ZESTRIL) 10 MG tablet Take 1 tablet (10 mg) by mouth daily 90 tablet 1      metFORMIN (GLUCOPHAGE XR) 500 MG 24 hr tablet Take 2 tablets (1,000 mg) by mouth 2 times daily (with meals) 360 tablet 2     sildenafil (VIAGRA) 100 MG tablet Take 1 tablet (100 mg) by mouth daily as needed 30 min to 4 hrs before sex. Do not use with nitroglycerin, terazosin or doxazosin. 6 tablet 1     STATIN NOT PRESCRIBED (INTENTIONAL) Please choose reason not prescribed from choices below.       omeprazole (PRILOSEC) 40 MG DR capsule Take 1 capsule (40 mg) by mouth daily (Patient not taking: Reported on 4/16/2024) 90 capsule 1          Review Of Systems  Skin: negative  Eyes: negative  Ears/Nose/Throat: negative  Respiratory: No shortness of breath, dyspnea on exertion, cough, or hemoptysis    O: Physical Exam:  Tender to palpation each knee medial joint line.  Adequate knee flexion and extension. CMS intact to both lower extremities.  No tension signs.  Varicosities medial R knee greater than L. Varus each lower extremity.      Lab:HgbA1C 7.1, chol 204, triglyc 202,     Images:  Medial compartment arthrosis each knee right greatrer than L.           A:  Medial compartment arthrosis each knee R greater than L      P:  MRI R knee to evaluate for possible uni vs total knee arthroplasty.  L knee consider uni arthroplasty.  Schedule R knee arthroplasty, see back as preop and decide uni vs total with MRI information.    Discussed interventions, risks/benefits/possible complications/procedure/postop rehab.  See back as preop.  Preop check list.             In addition to the above assessment and plan each active problem on Clarence's problem list was evaluated today. This included the questioning of Clarence for any medication problems. We will continue the current treatment plan for these active problems except as noted.        Knee arthroplasty book given along with pre-procedure soap.     Alicia Alba RN   [facility]      Again, thank you for allowing me to participate in the care  of your patient.        Sincerely,        Elia Reddy MD

## 2024-06-17 NOTE — PROGRESS NOTES
S:  Patient last evaluated about a year ago.  Continues to have bilateral knee pain, R greater than L.  Injection helped transiently.  HgbA1C went up after previous injections.  Would like to discuss surgical intervention.         Patient Active Problem List   Diagnosis    Lumbago    CARDIOVASCULAR SCREENING; LDL GOAL LESS THAN 130    Superficial phlebitis of right leg    Varicose veins of legs    Nephrolithiasis    Benign essential hypertension    Primary osteoarthritis of both knees    Type 2 diabetes mellitus with hyperglycemia, without long-term current use of insulin (H)            Past Medical History:   Diagnosis Date    Nephrolithiasis     Plantar fascial fibromatosis             Past Surgical History:   Procedure Laterality Date    CARPAL TUNNEL RELEASE RT/LT Left 03/05/2020    HC LAP,INGUINAL HERNIA REPR,INITIAL      x2, each side    SURGICAL HISTORY OF -       Right elbow screw due to fracture    ZZC APPENDECTOMY  1972            Social History     Tobacco Use    Smoking status: Never     Passive exposure: Yes    Smokeless tobacco: Never    Tobacco comments:     passive in home   Substance Use Topics    Alcohol use: Yes     Alcohol/week: 0.0 standard drinks of alcohol     Comment: 1 per week            Family History   Adopted: Yes   Problem Relation Age of Onset    Unknown/Adopted Mother     Unknown/Adopted Father                Allergies   Allergen Reactions    Nkda [No Known Drug Allergy]             Current Outpatient Medications   Medication Sig Dispense Refill    atorvastatin (LIPITOR) 20 MG tablet Take 1 tablet (20 mg) by mouth daily 90 tablet 3    lisinopril (ZESTRIL) 10 MG tablet Take 1 tablet (10 mg) by mouth daily 90 tablet 1    metFORMIN (GLUCOPHAGE XR) 500 MG 24 hr tablet Take 2 tablets (1,000 mg) by mouth 2 times daily (with meals) 360 tablet 2    sildenafil (VIAGRA) 100 MG tablet Take 1 tablet (100 mg) by mouth daily as needed 30 min to 4 hrs before sex. Do not use with nitroglycerin,  terazosin or doxazosin. 6 tablet 1    STATIN NOT PRESCRIBED (INTENTIONAL) Please choose reason not prescribed from choices below.      omeprazole (PRILOSEC) 40 MG DR capsule Take 1 capsule (40 mg) by mouth daily (Patient not taking: Reported on 4/16/2024) 90 capsule 1          Review Of Systems  Skin: negative  Eyes: negative  Ears/Nose/Throat: negative  Respiratory: No shortness of breath, dyspnea on exertion, cough, or hemoptysis    O: Physical Exam:  Tender to palpation each knee medial joint line.  Adequate knee flexion and extension. CMS intact to both lower extremities.  No tension signs.  Varicosities medial R knee greater than L. Varus each lower extremity.      Lab:HgbA1C 7.1, chol 204, triglyc 202,     Images:  Medial compartment arthrosis each knee right greatrer than L.           A:  Medial compartment arthrosis each knee R greater than L      P:  MRI R knee to evaluate for possible uni vs total knee arthroplasty.  L knee consider uni arthroplasty.  Schedule R knee arthroplasty, see back as preop and decide uni vs total with MRI information.    Discussed interventions, risks/benefits/possible complications/procedure/postop rehab.  See back as preop.  Preop check list.             In addition to the above assessment and plan each active problem on Clarence's problem list was evaluated today. This included the questioning of Clarence for any medication problems. We will continue the current treatment plan for these active problems except as noted.

## 2024-06-17 NOTE — PATIENT INSTRUCTIONS
Thank you for choosing [facility]!      FOLLOW-UP  Dr. Reddy would like you to follow-up 1-2 weeks before surgery. Please stop by the  on your way out or call [phone] to schedule.       LAB WORK   Dr. Reddy would like you to have some lab work completed. You can go to the Specialty Lab on the upper level to have this completed following your appointment, or you can call [phone] to schedule a future appointment if you do not have time to complete this today.           IMAGING  Please call [phone] to schedule your MRI .     *Once your imaging exam has been scheduled, our prior authorization team will connect with your insurance to ensure coverage of the exam. They will only reach out to you if a prior authorization is denied.  Please schedule your imaging exam at least 7 days out so our team has time to complete this process.  Failure to do so could result in insurance denial and you becoming responsible for the cost of the exam.     After your imaging appointment is scheduled, call [phone] to schedule your an in-person follow-up appointment with Dr. Reddy to discuss your results. .         PHYSICAL THERAPY  Dr. Reddy has placed orders for you to see physical therapy. They should be reaching out to you within the next two days to assist you in scheduling, however, if you do not hear from them they can be reached at [phone].             PROCEEDING WITH SURGERY  Because of your severe arthritis and failure of conservative measures, we have decided to proceed with a Right total knee arthroplasty in [place].    The risks for this procedure include, but are not limited to:  *Blood loss possibly requiring transfusion  *Blood clots with possible pulmonary embolism  *Infection or wound healing problems  *Nerve damage - you may experience ongoing numbness/tingling  *Vascular circulation problems  *Continued pain  *Instability  *Loosening or wear  *Anesthetic  risks  *The possibility of needing additional procedures    Scheduling your surgery:   *Our surgery schedulers will call you to schedule surgery.  If you don't get a call in the next few days, you can call [phone] to schedule.   *You will be receiving a packet of information via mail or [employer] once your surgery is scheduled.     Prior to surgery:  *You will need to have some lab work completed. Please stop by our specialty lab following your appt, or call [phone] to schedule a lab appointment prior to your surgery date.   *Please contact your dentist for a letter clearing you to proceed with surgery. This can be faxed to [phone].  *A pre-op physical with your primary physician is needed within 30 days of the surgery.  *You will need to complete a DEXA scan prior to surgery. Please call [phone] to schedule this. .    Leading up to surgery:   *Nothing to eat or drink for 8 hours before surgery.  *Stop blood thinners 5 days before surgery (aspirin, warfarin, anti-inflammatories).    *Staff from our surgery team will be reaching out with your arrival time 2-3 days prior to your procedure as the time is subject to change if any emergency cases are added on.     Recovery:  *Recovery time differs from patient to patient.    *You may spend 1 night in the hospital, but most people can return home at that point.  *You will be in Physical Therapy for 1-2 months until you have gained full range of motion. Physical therapy should reach out to you to schedule appointments for after your procedure, however, if you do not hear from them within 2 business days please call [phone].     Dental appointments:   *Routine dental cleanings should be done before surgery, it is recommended that no cleaning, dental procedures or colonoscopies be done for 6 months after joint replacement.   *Lifetime prophylactic antibiotics will be required before dental procedures, dental cleaning, and any non-sterile procedures.  Please reach out via [employer] or by calling [phone] if you are in need of a refill.

## 2024-07-08 ENCOUNTER — HOSPITAL ENCOUNTER (OUTPATIENT)
Dept: MRI IMAGING | Facility: CLINIC | Age: 64
Discharge: HOME | End: 2024-07-08
Attending: ORTHOPAEDIC SURGERY | Admitting: ORTHOPAEDIC SURGERY
Payer: COMMERCIAL

## 2024-07-08 DIAGNOSIS — M17.0 PRIMARY OSTEOARTHRITIS OF BOTH KNEES: ICD-10-CM

## 2024-07-08 PROCEDURE — 73721 MRI JNT OF LWR EXTRE W/O DYE: CPT | Mod: 26 | Performed by: RADIOLOGY

## 2024-07-08 PROCEDURE — 73721 MRI JNT OF LWR EXTRE W/O DYE: CPT | Mod: RT

## 2024-07-09 ENCOUNTER — ANCILLARY PROCEDURE (OUTPATIENT)
Dept: BONE DENSITY | Facility: CLINIC | Age: 64
End: 2024-07-09
Attending: ORTHOPAEDIC SURGERY
Payer: COMMERCIAL

## 2024-07-09 DIAGNOSIS — M17.0 PRIMARY OSTEOARTHRITIS OF BOTH KNEES: ICD-10-CM

## 2024-07-09 PROCEDURE — 77080 DXA BONE DENSITY AXIAL: CPT | Mod: TC | Performed by: RADIOLOGY

## 2024-07-10 DIAGNOSIS — I10 BENIGN ESSENTIAL HYPERTENSION: ICD-10-CM

## 2024-07-10 DIAGNOSIS — R05.9 COUGH: ICD-10-CM

## 2024-07-10 RX ORDER — LISINOPRIL 10 MG/1
10 TABLET ORAL DAILY
Qty: 90 TABLET | Refills: 0 | Status: SHIPPED | OUTPATIENT
Start: 2024-07-10 | End: 2024-09-23

## 2024-07-11 RX ORDER — OMEPRAZOLE 40 MG/1
40 CAPSULE, DELAYED RELEASE ORAL DAILY
Qty: 90 CAPSULE | Refills: 1 | Status: SHIPPED | OUTPATIENT
Start: 2024-07-11

## 2024-07-12 ENCOUNTER — OFFICE VISIT (OUTPATIENT)
Dept: FAMILY MEDICINE | Facility: CLINIC | Age: 64
End: 2024-07-12
Attending: FAMILY MEDICINE
Payer: COMMERCIAL

## 2024-07-12 VITALS
WEIGHT: 203.1 LBS | TEMPERATURE: 98.2 F | SYSTOLIC BLOOD PRESSURE: 103 MMHG | OXYGEN SATURATION: 97 % | BODY MASS INDEX: 29.07 KG/M2 | DIASTOLIC BLOOD PRESSURE: 73 MMHG | HEIGHT: 70 IN | RESPIRATION RATE: 15 BRPM | HEART RATE: 76 BPM

## 2024-07-12 DIAGNOSIS — E11.65 TYPE 2 DIABETES MELLITUS WITH HYPERGLYCEMIA, WITHOUT LONG-TERM CURRENT USE OF INSULIN (H): Primary | ICD-10-CM

## 2024-07-12 DIAGNOSIS — I10 BENIGN ESSENTIAL HYPERTENSION: ICD-10-CM

## 2024-07-12 LAB
HBA1C MFR BLD: 7.5 % (ref 0–5.6)
HOLD SPECIMEN: NORMAL
HOLD SPECIMEN: NORMAL

## 2024-07-12 PROCEDURE — G2211 COMPLEX E/M VISIT ADD ON: HCPCS | Performed by: FAMILY MEDICINE

## 2024-07-12 PROCEDURE — 36415 COLL VENOUS BLD VENIPUNCTURE: CPT | Performed by: FAMILY MEDICINE

## 2024-07-12 PROCEDURE — 99214 OFFICE O/P EST MOD 30 MIN: CPT | Performed by: FAMILY MEDICINE

## 2024-07-12 PROCEDURE — 83036 HEMOGLOBIN GLYCOSYLATED A1C: CPT | Performed by: FAMILY MEDICINE

## 2024-07-12 ASSESSMENT — PAIN SCALES - GENERAL: PAINLEVEL: NO PAIN (0)

## 2024-07-12 NOTE — PROGRESS NOTES
Assessment and Plan    (E11.65) Type 2 diabetes mellitus with hyperglycemia, without long-term current use of insulin (H)  (primary encounter diagnosis)  Comment: Has been very inconsistent with metformin out of concerns about dropping his BS too low.  Explained this is essentially not possible with this medcation and he says he will be more consistent taking it  Plan: Hemoglobin A1c, FOOT EXAM            (I10) Benign essential hypertension  Comment: well treated  Plan:       RTC in     Bharath Love MD      Guru Lima is a 64 year old, presenting for the following health issues:  Hypertension        7/12/2024     8:02 AM   Additional Questions   Roomed by MR   Accompanied by NA         7/12/2024     8:02 AM   Patient Reported Additional Medications   Patient reports taking the following new medications NA     Via the Health Maintenance questionnaire, the patient has reported the following services have been completed -Eye Exam: [facility] 2024-01-01, this information has been sent to the abstraction team.    CECY OBTAINED    History of Present Illness       Diabetes:   He presents for follow up of diabetes.  He is checking home blood glucose one time daily.   He checks blood glucose at bedtime.  Blood glucose is never over 200 and never under 70. He is aware of hypoglycemia symptoms including shakiness.    He has no concerns regarding his diabetes at this time.   He is not experiencing numbness or burning in feet, excessive thirst, blurry vision, weight changes or redness, sores or blisters on feet. The patient has had a diabetic eye exam in the last 12 months. Eye exam performed on 00002024. Location of last eye exam [facility].          DECLINES VACCINES       Hyperlipidemia Follow-Up    Are you regularly taking any medication or supplement to lower your cholesterol?   Yes- atorvastatin   Are you having muscle aches or other side effects that you think could be caused by your  "cholesterol lowering medication?  No    Feeling well overall.  Still struggling with knee pain, is gearing up for THR    Denies CP, palpitations, edema, dyspnea, HA, vision changes. Admits that he is irregular with his metformin as he won't take it if his BS looks OK.  Estimates that he is probably taking about half his prescribed dose.    Is not fasting.      Objective    /73 (BP Location: Right arm, Patient Position: Sitting, Cuff Size: Adult Regular)   Pulse 76   Temp 98.2  F (36.8  C) (Oral)   Resp 15   Ht 1.77 m (5' 9.69\")   Wt 92.1 kg (203 lb 1.6 oz)   SpO2 97%   BMI 29.41 kg/m    Body mass index is 29.41 kg/m .  Physical Exam  Vitals and nursing note reviewed.   Constitutional:       General: He is not in acute distress.     Appearance: He is well-developed.   Cardiovascular:      Rate and Rhythm: Normal rate and regular rhythm.      Pulses:           Dorsalis pedis pulses are 2+ on the right side and 2+ on the left side.      Heart sounds: Normal heart sounds.   Pulmonary:      Effort: Pulmonary effort is normal.      Breath sounds: Normal breath sounds.   Skin:     General: Skin is warm and dry.      Comments: No callus, onychomycosis or wound KANA feet   Neurological:      Mental Status: He is alert and oriented to person, place, and time.      Deep Tendon Reflexes:      Reflex Scores:       Achilles reflexes are 2+ on the right side and 2+ on the left side.     Comments: Normal filament and proprioception KANA feet   Psychiatric:         Judgment: Judgment normal.              Signed Electronically by: Bharath Love MD    "

## 2024-07-16 ENCOUNTER — TELEPHONE (OUTPATIENT)
Dept: ORTHOPEDICS | Facility: CLINIC | Age: 64
End: 2024-07-16
Payer: COMMERCIAL

## 2024-07-16 NOTE — TELEPHONE ENCOUNTER
Other: Requesting c/b from [facility]     Could we send this information to you in [employer] or would you prefer to receive a phone call?:   Patient would prefer a phone call   Okay to leave a detailed message?: No at Home number on file [phone] (home)

## 2024-07-17 ENCOUNTER — MEDICAL CORRESPONDENCE (OUTPATIENT)
Dept: HEALTH INFORMATION MANAGEMENT | Facility: CLINIC | Age: 64
End: 2024-07-17

## 2024-07-17 NOTE — TELEPHONE ENCOUNTER
Talked with Wife Charmaine and patient will call clinic back to set up MRI follow up with Dr. Barry MD.     Alicia Alba, RN   [facility]

## 2024-07-19 ENCOUNTER — OFFICE VISIT (OUTPATIENT)
Dept: ORTHOPEDICS | Facility: CLINIC | Age: 64
End: 2024-07-19
Payer: COMMERCIAL

## 2024-07-19 VITALS
TEMPERATURE: 97.6 F | DIASTOLIC BLOOD PRESSURE: 80 MMHG | SYSTOLIC BLOOD PRESSURE: 122 MMHG | BODY MASS INDEX: 30.07 KG/M2 | HEIGHT: 69 IN | WEIGHT: 203 LBS

## 2024-07-19 DIAGNOSIS — M17.10 ARTHRITIS OF KNEE: Primary | ICD-10-CM

## 2024-07-19 PROCEDURE — 99213 OFFICE O/P EST LOW 20 MIN: CPT | Performed by: ORTHOPAEDIC SURGERY

## 2024-07-19 NOTE — LETTER
7/19/2024      Clarence Browning [place]  [address] [address] [address]      Dear Colleague,    Thank you for referring your patient, Clarence Tai, to the [facility]. Please see a copy of my visit note below.    S:Patient continues to have knee issues, Left greater than Right and would like to proceed with arthroplasty.  Here to review studies and discuss next steps.         Patient Active Problem List   Diagnosis     Lumbago     CARDIOVASCULAR SCREENING; LDL GOAL LESS THAN 130     Superficial phlebitis of right leg     Varicose veins of legs     Nephrolithiasis     Benign essential hypertension     Primary osteoarthritis of both knees     Type 2 diabetes mellitus with hyperglycemia, without long-term current use of insulin (H)            Past Medical History:   Diagnosis Date     Nephrolithiasis      Plantar fascial fibromatosis             Past Surgical History:   Procedure Laterality Date     CARPAL TUNNEL RELEASE RT/LT Left 03/05/2020     HC LAP,INGUINAL HERNIA REPR,INITIAL      x2, each side     SURGICAL HISTORY OF -       Right elbow screw due to fracture     ZZC APPENDECTOMY  1972            Social History     Tobacco Use     Smoking status: Never     Passive exposure: Yes     Smokeless tobacco: Never     Tobacco comments:     passive in home   Substance Use Topics     Alcohol use: Yes     Alcohol/week: 0.0 standard drinks of alcohol     Comment: 1 per week            Family History   Adopted: Yes   Problem Relation Age of Onset     Unknown/Adopted Mother      Unknown/Adopted Father                Allergies   Allergen Reactions     Nkda [No Known Drug Allergy]             Current Outpatient Medications   Medication Sig Dispense Refill     atorvastatin (LIPITOR) 20 MG tablet Take 1 tablet (20 mg) by mouth daily 90 tablet 3     lisinopril (ZESTRIL) 10 MG tablet TAKE 1 TABLET (10 MG) BY MOUTH DAILY. 90 tablet 0     metFORMIN (GLUCOPHAGE XR) 500 MG 24 hr tablet Take 2 tablets (1,000 mg)  by mouth 2 times daily (with meals) 360 tablet 2     omeprazole (PRILOSEC) 40 MG DR capsule TAKE 1 CAPSULE BY MOUTH EVERY DAY 90 capsule 1     sildenafil (VIAGRA) 100 MG tablet Take 1 tablet (100 mg) by mouth daily as needed 30 min to 4 hrs before sex. Do not use with nitroglycerin, terazosin or doxazosin. 6 tablet 1          Review Of Systems  Skin: negative  Eyes: negative  Ears/Nose/Throat: negative  Respiratory: No shortness of breath, dyspnea on exertion, cough, or hemoptysis    O: Physical Exam:Pain to palpation medial joint line left knee greater than R knee medial compartment.  Varus bilateral lower extremities.  CMS intact to each lower extremity.  Some effusion each knee.    Lab:  HgbA1C 7.5, Cr 1.03    Images:  Narrative & Impression   XR KNEE BILATERAL G/E 4 VIEWS   6/17/2024 12:08 PM      HISTORY: Primary osteoarthritis of both knees  COMPARISON: 10/25/22.                                                                       IMPRESSION:      RIGHT KNEE: There is advanced medial compartment and mild  patellofemoral compartment degenerative arthrosis. No acute fracture  is identified. No sizable joint effusion. Small chronic ossicle  projects inferior to the patella on the lateral view.     LEFT KNEE: There is moderate to advanced medial and mild  patellofemoral compartment degenerative arthrosis. No acute fracture  is identified. No sizable joint effusion.     Narrative & Impression   MR right knee without contrast 7/8/2024 6:15 PM     Techniques: Multiplanar multisequence imaging of the right knee was  obtained without administration of intra-articular or intravenous  contrast using routing protocol.     History: Primary osteoarthritis of both knees     Comparison: Radiographs dated 6/17/2024     Findings:     MENISCI:  Medial meniscus: Complex degenerative tearing of the medial meniscus  with complete extrusion.  Lateral meniscus: Intact.     LIGAMENTS  Cruciate ligaments: Mucoid degeneration of the  ACL could represent  sequela of prior injury. Posterior cruciate ligament is intact and  unremarkable.  Medial supporting structures: Bowing of the medial supporting  structures due to meniscal extrusion. The deep meniscal femoral and  meniscotibial ligaments as well as the superficial medial collateral  ligament are intact.  Lateral supporting structures: Iliotibial band, lateral collateral  ligament, biceps femoris and popliteus tendons are intact and  unremarkable.     EXTENSOR MECHANISM  Intact.  Loose body in Hoffa's fat pad.     FLUID  No joint effusion. No substantial Baker's cyst.     OSSEOUS and ARTICULAR STRUCTURES  Bones: No fracture, contusion, or osseous lesion is seen.     Patellofemoral compartment: No significant hyaline cartilage disease.     Medial compartment: Full-thickness articular cartilage loss in the  central weightbearing aspect of the entire medial compartment. This is  consistent this grade IV chondromalacia.     Lateral compartment: No significant hyaline cartilage disease.     ANCILLARY FINDINGS  Cystic changes in the intercondylar notch. Significant varicose  changes at the medial aspect of the joint.                                                                      Impression:  1. Complex degenerative tearing of the medial meniscus, which is  completely extruded, creating a meniscal deficient medial compartment.  2. Full-thickness articular cartilage loss in the central  weightbearing aspect of the entire medial compartment, grade IV  chondromalacia.  3. Mucoid degeneration of the ACL, cystic changes within the  intercondylar notch, this could be sequela of prior injury.  4. Moderate joint effusion.  5. Significant dilated venous varicosis at the medial aspect of the  joint.  6. Largely preserved lateral and patellofemoral compartments.  7. Small loose body in Hoffa's fat pad.   EXAM: DX AXIAL HIPS/SPINE  LOCATION: [facility]  DATE: 7/9/2024     INDICATION:   Primary osteoarthritis of both knees  DEMOGRAPHICS: Age- 64 years. Gender- Male.  COMPARISON: No prior studies available on the current scanner.  TECHNIQUE: Dual-energy x-ray absorptiometry (DXA) performed with routine technique.     FINDINGS:     DXA RESULTS  -Lumbar Spine: L1-L4: BMD: 1.211 g/cm2. T-score: -0.2. Z-score: 0.2. Degenerative change may artifactually increase BMD.  -RIGHT Hip Total: BMD: 0.877 g/cm2. T-score: -1.6. Z-score: -1.1.  -RIGHT Hip Femoral neck: BMD: 0.781 g/cm2. T-score: -2.2. Z-score: -1.2.  -LEFT Hip Total: BMD: 0.944 g/cm2. T-score: -1.1. Z-score: -0.6.  -LEFT Hip Femoral neck: BMD: 0.865 g/cm2. T-score: -1.6. Z-score: -0.6.     WHO T-SCORE CRITERIA  -Normal: T score at or above -1 SD  -Osteopenia: T score between -1 and -2.5 SD  -Osteoporosis: T score at or below -2.5 SD     The World Health Organization (WHO) criteria is applicable to perimenopausal females, postmenopausal females, and men aged 50 years or older.     FRACTURE RISK  -FRAX Results: The 10 year probability of major osteoporotic fracture is 7.7%, and of hip fracture is 1.6%, based on right femoral neck BMD.     RECOMMENDATIONS  Consider treatment if major osteoporotic fracture score is greater than or equal to 20%, or if the hip fracture score is greater than or equal to 3%.                                                                      IMPRESSION: Low bone density (OSTEOPENIA). T score meets the WHO criteria for low bone density (osteopenia) at one or more measured sites. The risk of osteoporotic fracture increases approximately two-fold for each standard deviation decrease in T-score.         A:  L knee arthrosis, primarily medial compartment      P:  Plan unicompartmental L knee medial compartment arthroplasty, confirm with arthroscopy at same time.  Have discussed risks/benefits/possible complications/ procedure/post op rehab.  NO contraindications to procedure, continue preop check list, see back at time of  procedure if no other issues.             In addition to the above assessment and plan each active problem on Clarence's problem list was evaluated today. This included the questioning of Clarence for any medication problems. We will continue the current treatment plan for these active problems except as noted.        Again, thank you for allowing me to participate in the care of your patient.        Sincerely,        Elia Reddy MD

## 2024-07-24 ENCOUNTER — TELEPHONE (OUTPATIENT)
Dept: ORTHOPEDICS | Facility: CLINIC | Age: 64
End: 2024-07-24
Payer: COMMERCIAL

## 2024-07-24 NOTE — TELEPHONE ENCOUNTER
Other: pt called, last friday was seen by provider, care team was supposed to turn in paperwork to insurance to have partial knee replacement. Wants a follow up. Would like to schedule surgery appt. Would also like for Alicia Alba, RN to contact him back.      Could we send this information to you in [facility] or would you prefer to receive a phone call?:   Patient would prefer a phone call   Okay to leave a detailed message?: Yes at Home number on file [phone] (home)

## 2024-07-25 ENCOUNTER — TELEPHONE (OUTPATIENT)
Dept: ORTHOPEDICS | Facility: CLINIC | Age: 64
End: 2024-07-25

## 2024-07-25 RX ORDER — TRANEXAMIC ACID 650 MG/1
1950 TABLET ORAL ONCE
Status: CANCELLED | OUTPATIENT
Start: 2024-07-25 | End: 2024-07-25

## 2024-07-25 NOTE — TELEPHONE ENCOUNTER
Talked with patient's wife Charmaine, and patient is hoping to get on the surgery scheduled for early August for a TKA due to his work schedule.     Will update Dr. Barry MD so case request can be put in.     Alicia Alba RN   [facility]

## 2024-07-25 NOTE — TELEPHONE ENCOUNTER
Type of surgery: ARTHROPLASTY, KNEE, UNICOMPARTMENTAL (Left)   ARTHROSCOPY, KNEE (Left)   Location of surgery: [facility] OR  Date and time of surgery: 10/03/2024  Surgeon: Barry  Pre-Op Appt Date: 09/23/2024  Post-Op Appt Date: 10/21/2024   Packet sent out: Yes / mail   Pre-cert/Authorization completed:  No  Date:

## 2024-07-25 NOTE — PROGRESS NOTES
S:Patient continues to have knee issues, Left greater than Right and would like to proceed with arthroplasty.  Here to review studies and discuss next steps.         Patient Active Problem List   Diagnosis    Lumbago    CARDIOVASCULAR SCREENING; LDL GOAL LESS THAN 130    Superficial phlebitis of right leg    Varicose veins of legs    Nephrolithiasis    Benign essential hypertension    Primary osteoarthritis of both knees    Type 2 diabetes mellitus with hyperglycemia, without long-term current use of insulin (H)            Past Medical History:   Diagnosis Date    Nephrolithiasis     Plantar fascial fibromatosis             Past Surgical History:   Procedure Laterality Date    CARPAL TUNNEL RELEASE RT/LT Left 03/05/2020    HC LAP,INGUINAL HERNIA REPR,INITIAL      x2, each side    SURGICAL HISTORY OF -       Right elbow screw due to fracture    ZZC APPENDECTOMY  1972            Social History     Tobacco Use    Smoking status: Never     Passive exposure: Yes    Smokeless tobacco: Never    Tobacco comments:     passive in home   Substance Use Topics    Alcohol use: Yes     Alcohol/week: 0.0 standard drinks of alcohol     Comment: 1 per week            Family History   Adopted: Yes   Problem Relation Age of Onset    Unknown/Adopted Mother     Unknown/Adopted Father                Allergies   Allergen Reactions    Nkda [No Known Drug Allergy]             Current Outpatient Medications   Medication Sig Dispense Refill    atorvastatin (LIPITOR) 20 MG tablet Take 1 tablet (20 mg) by mouth daily 90 tablet 3    lisinopril (ZESTRIL) 10 MG tablet TAKE 1 TABLET (10 MG) BY MOUTH DAILY. 90 tablet 0    metFORMIN (GLUCOPHAGE XR) 500 MG 24 hr tablet Take 2 tablets (1,000 mg) by mouth 2 times daily (with meals) 360 tablet 2    omeprazole (PRILOSEC) 40 MG DR capsule TAKE 1 CAPSULE BY MOUTH EVERY DAY 90 capsule 1    sildenafil (VIAGRA) 100 MG tablet Take 1 tablet (100 mg) by mouth daily as needed 30 min to 4 hrs before sex. Do not use  with nitroglycerin, terazosin or doxazosin. 6 tablet 1          Review Of Systems  Skin: negative  Eyes: negative  Ears/Nose/Throat: negative  Respiratory: No shortness of breath, dyspnea on exertion, cough, or hemoptysis    O: Physical Exam:Pain to palpation medial joint line left knee greater than R knee medial compartment.  Varus bilateral lower extremities.  CMS intact to each lower extremity.  Some effusion each knee.    Lab:  HgbA1C 7.5, Cr 1.03    Images:  Narrative & Impression   XR KNEE BILATERAL G/E 4 VIEWS   6/17/2024 12:08 PM      HISTORY: Primary osteoarthritis of both knees  COMPARISON: 10/25/22.                                                                       IMPRESSION:      RIGHT KNEE: There is advanced medial compartment and mild  patellofemoral compartment degenerative arthrosis. No acute fracture  is identified. No sizable joint effusion. Small chronic ossicle  projects inferior to the patella on the lateral view.     LEFT KNEE: There is moderate to advanced medial and mild  patellofemoral compartment degenerative arthrosis. No acute fracture  is identified. No sizable joint effusion.     Narrative & Impression   MR right knee without contrast 7/8/2024 6:15 PM     Techniques: Multiplanar multisequence imaging of the right knee was  obtained without administration of intra-articular or intravenous  contrast using routing protocol.     History: Primary osteoarthritis of both knees     Comparison: Radiographs dated 6/17/2024     Findings:     MENISCI:  Medial meniscus: Complex degenerative tearing of the medial meniscus  with complete extrusion.  Lateral meniscus: Intact.     LIGAMENTS  Cruciate ligaments: Mucoid degeneration of the ACL could represent  sequela of prior injury. Posterior cruciate ligament is intact and  unremarkable.  Medial supporting structures: Bowing of the medial supporting  structures due to meniscal extrusion. The deep meniscal femoral and  meniscotibial ligaments as  well as the superficial medial collateral  ligament are intact.  Lateral supporting structures: Iliotibial band, lateral collateral  ligament, biceps femoris and popliteus tendons are intact and  unremarkable.     EXTENSOR MECHANISM  Intact.  Loose body in Hoffa's fat pad.     FLUID  No joint effusion. No substantial Baker's cyst.     OSSEOUS and ARTICULAR STRUCTURES  Bones: No fracture, contusion, or osseous lesion is seen.     Patellofemoral compartment: No significant hyaline cartilage disease.     Medial compartment: Full-thickness articular cartilage loss in the  central weightbearing aspect of the entire medial compartment. This is  consistent this grade IV chondromalacia.     Lateral compartment: No significant hyaline cartilage disease.     ANCILLARY FINDINGS  Cystic changes in the intercondylar notch. Significant varicose  changes at the medial aspect of the joint.                                                                      Impression:  1. Complex degenerative tearing of the medial meniscus, which is  completely extruded, creating a meniscal deficient medial compartment.  2. Full-thickness articular cartilage loss in the central  weightbearing aspect of the entire medial compartment, grade IV  chondromalacia.  3. Mucoid degeneration of the ACL, cystic changes within the  intercondylar notch, this could be sequela of prior injury.  4. Moderate joint effusion.  5. Significant dilated venous varicosis at the medial aspect of the  joint.  6. Largely preserved lateral and patellofemoral compartments.  7. Small loose body in Hoffa's fat pad.   EXAM: DX AXIAL HIPS/SPINE  LOCATION: [facility]  DATE: 7/9/2024     INDICATION:  Primary osteoarthritis of both knees  DEMOGRAPHICS: Age- 64 years. Gender- Male.  COMPARISON: No prior studies available on the current scanner.  TECHNIQUE: Dual-energy x-ray absorptiometry (DXA) performed with routine technique.     FINDINGS:     DXA  RESULTS  -Lumbar Spine: L1-L4: BMD: 1.211 g/cm2. T-score: -0.2. Z-score: 0.2. Degenerative change may artifactually increase BMD.  -RIGHT Hip Total: BMD: 0.877 g/cm2. T-score: -1.6. Z-score: -1.1.  -RIGHT Hip Femoral neck: BMD: 0.781 g/cm2. T-score: -2.2. Z-score: -1.2.  -LEFT Hip Total: BMD: 0.944 g/cm2. T-score: -1.1. Z-score: -0.6.  -LEFT Hip Femoral neck: BMD: 0.865 g/cm2. T-score: -1.6. Z-score: -0.6.     WHO T-SCORE CRITERIA  -Normal: T score at or above -1 SD  -Osteopenia: T score between -1 and -2.5 SD  -Osteoporosis: T score at or below -2.5 SD     The World Health Organization (WHO) criteria is applicable to perimenopausal females, postmenopausal females, and men aged 50 years or older.     FRACTURE RISK  -FRAX Results: The 10 year probability of major osteoporotic fracture is 7.7%, and of hip fracture is 1.6%, based on right femoral neck BMD.     RECOMMENDATIONS  Consider treatment if major osteoporotic fracture score is greater than or equal to 20%, or if the hip fracture score is greater than or equal to 3%.                                                                      IMPRESSION: Low bone density (OSTEOPENIA). T score meets the WHO criteria for low bone density (osteopenia) at one or more measured sites. The risk of osteoporotic fracture increases approximately two-fold for each standard deviation decrease in T-score.         A:  L knee arthrosis, primarily medial compartment      P:  Plan unicompartmental L knee medial compartment arthroplasty, confirm with arthroscopy at same time.  Have discussed risks/benefits/possible complications/ procedure/post op rehab.  NO contraindications to procedure, continue preop check list, see back at time of procedure if no other issues.             In addition to the above assessment and plan each active problem on Clarence's problem list was evaluated today. This included the questioning of Clarence for any medication problems. We will continue the current  treatment plan for these active problems except as noted.

## 2024-08-26 NOTE — TELEPHONE ENCOUNTER
Patient is calling noting that the surgery paperwork that he received is noting Left knee but his MRI and his bone density was on his Right. Please call patient to verify with knee he is supposed to be having surgery on. Thank you     [phone]    Patient notes that you can speak with his wife.

## 2024-09-03 RX ORDER — TRANEXAMIC ACID 650 MG/1
1950 TABLET ORAL ONCE
Status: CANCELLED | OUTPATIENT
Start: 2024-09-03 | End: 2024-09-03

## 2024-09-04 NOTE — TELEPHONE ENCOUNTER
New care request received with new procedure:    Right knee arthroscopy followed by R knee medial compartment unicompartmental arthroplasty

## 2024-09-18 ENCOUNTER — TELEPHONE (OUTPATIENT)
Dept: ORTHOPEDICS | Facility: CLINIC | Age: 64
End: 2024-09-18
Payer: COMMERCIAL

## 2024-09-18 NOTE — TELEPHONE ENCOUNTER
Reason for Call:  Form, our goal is to have forms completed with 72 hours, however, some forms may require a visit or additional information.    Type of letter, form or note:  FMLA    Who is the form from?:     Where did the form come from: Patient or family brought in       What clinic location was the form placed at?: [facility]    Where the form was placed: Given to MA/RN    What number is listed as a contact on the form?:        Additional comments: email to patient at [email or web address]    Alicia Alba RN   [facility]

## 2024-09-23 ENCOUNTER — OFFICE VISIT (OUTPATIENT)
Dept: FAMILY MEDICINE | Facility: CLINIC | Age: 64
End: 2024-09-23
Payer: COMMERCIAL

## 2024-09-23 VITALS
HEART RATE: 67 BPM | RESPIRATION RATE: 16 BRPM | HEIGHT: 69 IN | WEIGHT: 202 LBS | TEMPERATURE: 97.6 F | OXYGEN SATURATION: 95 % | BODY MASS INDEX: 29.92 KG/M2 | SYSTOLIC BLOOD PRESSURE: 117 MMHG | DIASTOLIC BLOOD PRESSURE: 83 MMHG

## 2024-09-23 DIAGNOSIS — I10 BENIGN ESSENTIAL HYPERTENSION: ICD-10-CM

## 2024-09-23 DIAGNOSIS — Z01.818 PRE-OP EXAM: Primary | ICD-10-CM

## 2024-09-23 DIAGNOSIS — Z12.11 SCREEN FOR COLON CANCER: ICD-10-CM

## 2024-09-23 DIAGNOSIS — M17.0 PRIMARY OSTEOARTHRITIS OF BOTH KNEES: ICD-10-CM

## 2024-09-23 DIAGNOSIS — E11.65 TYPE 2 DIABETES MELLITUS WITH HYPERGLYCEMIA, WITHOUT LONG-TERM CURRENT USE OF INSULIN (H): ICD-10-CM

## 2024-09-23 LAB
ANION GAP SERPL CALCULATED.3IONS-SCNC: 14 MMOL/L (ref 7–15)
BUN SERPL-MCNC: 12.8 MG/DL (ref 8–23)
CALCIUM SERPL-MCNC: 9.7 MG/DL (ref 8.8–10.4)
CHLORIDE SERPL-SCNC: 106 MMOL/L (ref 98–107)
CREAT SERPL-MCNC: 0.96 MG/DL (ref 0.67–1.17)
EGFRCR SERPLBLD CKD-EPI 2021: 89 ML/MIN/1.73M2
ERYTHROCYTE [DISTWIDTH] IN BLOOD BY AUTOMATED COUNT: 12.1 % (ref 10–15)
EST. AVERAGE GLUCOSE BLD GHB EST-MCNC: 163 MG/DL
GLUCOSE SERPL-MCNC: 147 MG/DL (ref 70–99)
HBA1C MFR BLD: 7.3 % (ref 0–5.6)
HCO3 SERPL-SCNC: 21 MMOL/L (ref 22–29)
HCT VFR BLD AUTO: 46.8 % (ref 40–53)
HGB BLD-MCNC: 15.9 G/DL (ref 13.3–17.7)
MCH RBC QN AUTO: 30.6 PG (ref 26.5–33)
MCHC RBC AUTO-ENTMCNC: 34 G/DL (ref 31.5–36.5)
MCV RBC AUTO: 90 FL (ref 78–100)
PLATELET # BLD AUTO: 275 10E3/UL (ref 150–450)
POTASSIUM SERPL-SCNC: 4.4 MMOL/L (ref 3.4–5.3)
RBC # BLD AUTO: 5.2 10E6/UL (ref 4.4–5.9)
SODIUM SERPL-SCNC: 141 MMOL/L (ref 135–145)
WBC # BLD AUTO: 6.1 10E3/UL (ref 4–11)

## 2024-09-23 PROCEDURE — 36415 COLL VENOUS BLD VENIPUNCTURE: CPT | Performed by: FAMILY MEDICINE

## 2024-09-23 PROCEDURE — 99214 OFFICE O/P EST MOD 30 MIN: CPT | Performed by: FAMILY MEDICINE

## 2024-09-23 PROCEDURE — 93000 ELECTROCARDIOGRAM COMPLETE: CPT | Performed by: FAMILY MEDICINE

## 2024-09-23 PROCEDURE — 80048 BASIC METABOLIC PNL TOTAL CA: CPT | Performed by: FAMILY MEDICINE

## 2024-09-23 PROCEDURE — 83036 HEMOGLOBIN GLYCOSYLATED A1C: CPT | Performed by: FAMILY MEDICINE

## 2024-09-23 PROCEDURE — 85027 COMPLETE CBC AUTOMATED: CPT | Performed by: FAMILY MEDICINE

## 2024-09-23 RX ORDER — LISINOPRIL 10 MG/1
10 TABLET ORAL DAILY
Qty: 90 TABLET | Refills: 0 | Status: SHIPPED | OUTPATIENT
Start: 2024-09-23

## 2024-09-23 ASSESSMENT — PAIN SCALES - GENERAL: PAINLEVEL: MILD PAIN (3)

## 2024-09-23 NOTE — PROGRESS NOTES
Preoperative Evaluation  [facility]  [address] [address]  Phone: [phone]  Primary Provider: Bharath Love MD  Pre-op Performing Provider: Bharath Love MD  Sep 23, 2024             9/23/2024   Surgical Information   What procedure is being done? pre op for partial knee replacemnt   Facility or Hospital where procedure/surgery will be performed: [facility]   Who is doing the procedure / surgery? do   Date of surgery / procedure: [id number]   Time of surgery / procedure: to be determined   Where do you plan to recover after surgery? at home with family        Fax number for surgical facility: Note does not need to be faxed, will be available electronically in Epic.    Assessment and Plan    (Z01.818) Pre-op exam  (primary encounter diagnosis)  Comment: cleared for surgery without further eval  Plan: EKG 12-lead complete w/read - Clinics, Basic         metabolic panel  (Ca, Cl, CO2, Creat, Gluc, K,         Na, BUN), CBC with platelets            (I10) Benign essential hypertension  Comment: BP looks good, refilling  Plan: lisinopril (ZESTRIL) 10 MG tablet            (E11.65) Type 2 diabetes mellitus with hyperglycemia, without long-term current use of insulin (H)  Comment: A1c remains a bit over goal, but not enough to impact surgery (7.3%)   Plan: Hemoglobin A1c, Basic metabolic panel  (Ca, Cl,        CO2, Creat, Gluc, K, Na, BUN)            (M17.0) Primary osteoarthritis of both knees  Comment:   Plan: Basic metabolic panel  (Ca, Cl, CO2, Creat,         Gluc, K, Na, BUN)                RTC in  Dm    Bharath Love MD     Guru Lima is a 64 year old, presenting for the following:  Pre-Op Exam          9/23/2024     7:07 AM   Additional Questions   Roomed by connie iqbal   Accompanied by self         9/23/2024     7:07 AM   Patient Reported Additional Medications   Patient reports taking the following new medications na     HPI  related to upcoming procedure: R medial partial knee replacement.  Overall feeling well.  Denies CP, palpitations, edema, dyspnea, HA, vision changes. No n/v, change in bowel or bladder habits.        9/23/2024   Pre-Op Questionnaire   Have you ever had a heart attack or stroke? No   Have you ever had surgery on your heart or blood vessels, such as a stent placement, a coronary artery bypass, or surgery on an artery in your head, neck, heart, or legs? No   Do you have chest pain with activity? No   Do you have a history of heart failure? No   Do you currently have a cold, bronchitis or symptoms of other infection? No   Do you have a cough, shortness of breath, or wheezing? No   Do you or anyone in your family have previous history of blood clots? (!) UNKNOWN    Do you or does anyone in your family have a serious bleeding problem such as prolonged bleeding following surgeries or cuts? (!) UNKNOWN    Have you ever had problems with anemia or been told to take iron pills? No   Have you had any abnormal blood loss such as black, tarry or bloody stools? No   Have you ever had a blood transfusion? No   Are you willing to have a blood transfusion if it is medically needed before, during, or after your surgery? Yes   Have you or any of your relatives ever had problems with anesthesia? (!) UNKNOWN    Do you have sleep apnea, excessive snoring or daytime drowsiness? (!) YES   Do you have a CPAP machine? (!) NO - awaiting sleep eval   Do you have any artifical heart valves or other implanted medical devices like a pacemaker, defibrillator, or continuous glucose monitor? No   Do you have artificial joints? No   Are you allergic to latex? No        Health Care Directive  Patient does not have a Health Care Directive or Living Will:     Preoperative Review of    reviewed - no record of controlled substances prescribed.      Status of Chronic Conditions:  DIABETES - Patient has a longstanding history of DiabetesType Type II  . Patient is being treated with oral agents and denies significant side effects. Control has been fair. Complicating factors include but are not limited to: hypertension and hyperlipidemia.     HYPERLIPIDEMIA - Patient has a long history of significant Hyperlipidemia requiring medication for treatment with recent good control. Patient reports no problems or side effects with the medication.     HYPERTENSION - Patient has longstanding history of HTN , currently denies any symptoms referable to elevated blood pressure. Specifically denies chest pain, palpitations, dyspnea, orthopnea, PND or peripheral edema. Blood pressure readings have been in normal range. Current medication regimen is as listed below. Patient denies any side effects of medication.     SLEEP PROBLEM - Patient has a longstanding history of snoring, excessive daytime somnolence, and fatigue.. Patient has tried OTC medications with limited success.     Patient Active Problem List    Diagnosis Date Noted    Type 2 diabetes mellitus with hyperglycemia, without long-term current use of insulin (H) 08/23/2022     Priority: Medium    Primary osteoarthritis of both knees 08/20/2021     Priority: Medium    Benign essential hypertension 10/29/2018     Priority: Medium    Nephrolithiasis      Priority: Medium    Superficial phlebitis of right leg 06/15/2011     Priority: Medium    Varicose veins of legs 06/15/2011     Priority: Medium    CARDIOVASCULAR SCREENING; LDL GOAL LESS THAN 130 02/10/2010     Priority: Medium    Lumbago 11/08/2007     Priority: Medium      Past Medical History:   Diagnosis Date    Nephrolithiasis     Plantar fascial fibromatosis      Past Surgical History:   Procedure Laterality Date    CARPAL TUNNEL RELEASE RT/LT Left 03/05/2020    HC LAP,INGUINAL HERNIA REPR,INITIAL      x2, each side    SURGICAL HISTORY OF -       Right elbow screw due to fracture    ZZC APPENDECTOMY  1972     Current Outpatient Medications   Medication Sig Dispense  "Refill    atorvastatin (LIPITOR) 20 MG tablet Take 1 tablet (20 mg) by mouth daily 90 tablet 3    lisinopril (ZESTRIL) 10 MG tablet Take 1 tablet (10 mg) by mouth daily. 90 tablet 0    metFORMIN (GLUCOPHAGE XR) 500 MG 24 hr tablet Take 2 tablets (1,000 mg) by mouth 2 times daily (with meals) 360 tablet 2    omeprazole (PRILOSEC) 40 MG DR capsule TAKE 1 CAPSULE BY MOUTH EVERY DAY 90 capsule 1    sildenafil (VIAGRA) 100 MG tablet Take 1 tablet (100 mg) by mouth daily as needed 30 min to 4 hrs before sex. Do not use with nitroglycerin, terazosin or doxazosin. 6 tablet 1       Allergies   Allergen Reactions    Nkda [No Known Drug Allergy]         Social History     Tobacco Use    Smoking status: Never     Passive exposure: Yes    Smokeless tobacco: Never    Tobacco comments:     passive in home   Substance Use Topics    Alcohol use: Yes     Alcohol/week: 0.0 standard drinks of alcohol     Comment: 1 per week       History   Drug Use No               Objective    /83   Pulse 67   Temp 97.6  F (36.4  C) (Oral)   Resp 16   Ht 1.753 m (5' 9\")   Wt 91.6 kg (202 lb)   SpO2 95%   BMI 29.83 kg/m     Estimated body mass index is 29.83 kg/m  as calculated from the following:    Height as of this encounter: 1.753 m (5' 9\").    Weight as of this encounter: 91.6 kg (202 lb).  Physical Exam  Vitals and nursing note reviewed.   Constitutional:       Appearance: He is well-developed.   HENT:      Head: Normocephalic and atraumatic.      Right Ear: Tympanic membrane, ear canal and external ear normal.      Left Ear: Tympanic membrane, ear canal and external ear normal.   Eyes:      Pupils: Pupils are equal, round, and reactive to light.   Neck:      Thyroid: No thyromegaly.   Cardiovascular:      Rate and Rhythm: Normal rate and regular rhythm.      Heart sounds: Normal heart sounds.   Pulmonary:      Effort: Pulmonary effort is normal.      Breath sounds: Normal breath sounds.   Abdominal:      General: Bowel sounds are " normal.      Palpations: Abdomen is soft. There is no mass.   Musculoskeletal:         General: Normal range of motion.   Lymphadenopathy:      Cervical: No cervical adenopathy.   Skin:     General: Skin is warm and dry.      Findings: No rash.   Neurological:      Mental Status: He is alert and oriented to person, place, and time.   Psychiatric:         Judgment: Judgment normal.         Recent Labs   Lab Test 07/12/24  0755 04/16/24  1557 01/12/24  1438   NA  --   --  140   POTASSIUM  --   --  4.3   CR  --   --  1.03   A1C 7.5* 7.1* 6.9*        Diagnostics  Labs pending at this time.  Results will be reviewed when available.   EKG: appears normal, NSR, no LVH by voltage criteria, there are no prior tracings available, left axis    Revised Cardiac Risk Index (RCRI)  The patient has the following serious cardiovascular risks for perioperative complications:   - No serious cardiac risks = 0 points     RCRI Interpretation: 0 points: Class I (very low risk - 0.4% complication rate)         Signed Electronically by: Bharath Love MD  A copy of this evaluation report is provided to the requesting physician.

## 2024-09-24 NOTE — PROGRESS NOTES
Ortho Navigator Note      Pre-op Date 9/23/24     Medical Clearance  CLEARED     Labs WNL     COVID Test Date No longer indicated      Skin  Intact      Activity: Ambulates independently     Equipment Need Patient will likely need a walker for discharge. Defer to PT/OT for recs.       Meds to Hold Held all supplements 14 days prior to surgery  -HOLD all meds morning of surgery  -HOLD viagra 24hrs prior to surgery  * Medication recommendations are not intended to be exhaustive; they are limited to common medications that are potentially dangerous if incorrectly managed   NPO Instructions  Instructed to stop solids 8 hr prior to arrival and clears 2 hr prior to arrival.       Pre-op Joint Education Complete? Completed    Discharge Plan Patient has plan to discharge home on morning of POD 1.   Spouse Charmaine will arrive at hospital at 0800 to participate in therapy and discharge education. They will then transport patient home   ADDENDUM: POD 0   /Transportation Spouse Charmaine will be  and transportation.  is physically able to care for patient.      Barriers to Discharge No barriers to discharge.      Additional Info/   Special Needs : -Possible ROMARIO, no sleep study done.              09/17/24 1517   Discharge Planning   Patient/Family Anticipates Transition to home with family   Concerns to be Addressed denies needs/concerns at this time   Living Arrangements   People in Home spouse   Type of Residence Private Residence   Is your private residence a single family home or apartment? Single family home   Number of Stairs, Within Home, Primary greater than 10 stairs   Stair Railings, Within Home, Primary railings safe and in good condition   Once home, are you able to live on one level? No   Which level? Lower Level   Which rooms are not on the main level? Kitchen   Bathroom Shower/Tub Walk-in shower   Equipment Currently Used at Home commode chair;shower chair;raised toilet seat;walker, standard;tub bench    Support System   Support Systems Spouse/Significant Other   Do you have someone available to stay with you one or two nights once you are home? Yes   Medical Clearance   Date of Physical 09/23/24   [facility] Name [facility]   It is recommended that you call and check with any specialty providers before surgery to see if you need surgical clearance.  Do you see any specialty providers outside of your primary care provider? No   Blood   Known Bleeding Disorder or Coagulopathy No   Does the patient have any Mormon/cultural preferences related to blood products? Yes   Education   Has the patient scheduled or completed pre-op total joint education, either in class or online, in the last 12 months? No   Patient attended total joint pre-op class/received pre-op teaching  online   Relationship/Living Environment   Name(s) of People in Home Spouse Charmaine

## 2024-09-30 ENCOUNTER — MYC MEDICAL ADVICE (OUTPATIENT)
Dept: ORTHOPEDICS | Facility: CLINIC | Age: 64
End: 2024-09-30

## 2024-09-30 ENCOUNTER — OFFICE VISIT (OUTPATIENT)
Dept: ORTHOPEDICS | Facility: CLINIC | Age: 64
End: 2024-09-30
Payer: COMMERCIAL

## 2024-09-30 ENCOUNTER — TELEPHONE (OUTPATIENT)
Dept: ORTHOPEDICS | Facility: CLINIC | Age: 64
End: 2024-09-30

## 2024-09-30 VITALS
DIASTOLIC BLOOD PRESSURE: 75 MMHG | SYSTOLIC BLOOD PRESSURE: 119 MMHG | BODY MASS INDEX: 30.05 KG/M2 | WEIGHT: 203.5 LBS | TEMPERATURE: 97.6 F

## 2024-09-30 DIAGNOSIS — M17.0 PRIMARY OSTEOARTHRITIS OF BOTH KNEES: Primary | ICD-10-CM

## 2024-09-30 PROCEDURE — 99213 OFFICE O/P EST LOW 20 MIN: CPT | Performed by: ORTHOPAEDIC SURGERY

## 2024-09-30 ASSESSMENT — PAIN SCALES - GENERAL: PAINLEVEL: MILD PAIN (2)

## 2024-09-30 NOTE — TELEPHONE ENCOUNTER
I reached out to this patient secondary to the fact that the patient is going to be a same-day surgery total so we want to make sure everything is set up.  I did talk to him and his wife and below is our plan.            One Week Prior to Total Joint    Surgery: Right unicompartmental knee arthroplasty by Dr. Reddy 10/3/2024  Labs: Within normal limits other than slightly increased glucose and hemoglobin A1c secondary to his diabetes.  H&P: Cleared by MD Kate on 9/23/2024  Physical Therapy: He would like to do this in [place] but unfortunately does not have that set up yet.  SVEN Vargas will be faxing an order to [place] and getting him in in 1 week after the surgery at least.  DVT prophylaxis: Asprin 81mg bid x 6 weeks.   Dispo: Patient plans on going home the same day with his wife will be there for 24/7 care.  They are planning on staying in a hotel the day before to make sure they are here on time.  He is also okay using oxycodone and feels he has used that previously.      This note was dictated with [employer].    Elia Arellano PA-C

## 2024-09-30 NOTE — LETTER
9/30/2024      Clarence Tai  [id number] [facility] [id number]      Dear Colleague,    Thank you for referring your patient, Clarence Tai, to the [facility]. Please see a copy of my visit note below.    S: Patient having issues with  both knees but today right knee is most symptomatic.  Ready to proceed with unicompartmental arthroplasty R knee.          Patient Active Problem List   Diagnosis     Lumbago     CARDIOVASCULAR SCREENING; LDL GOAL LESS THAN 130     Superficial phlebitis of right leg     Varicose veins of legs     Nephrolithiasis     Benign essential hypertension     Primary osteoarthritis of both knees     Type 2 diabetes mellitus with hyperglycemia, without long-term current use of insulin (H)            Past Medical History:   Diagnosis Date     Nephrolithiasis      Plantar fascial fibromatosis             Past Surgical History:   Procedure Laterality Date     CARPAL TUNNEL RELEASE RT/LT Left 03/05/2020     HC LAP,INGUINAL HERNIA REPR,INITIAL      x2, each side     SURGICAL HISTORY OF -       Right elbow screw due to fracture     ZZC APPENDECTOMY  1972            Social History     Tobacco Use     Smoking status: Never     Passive exposure: Yes     Smokeless tobacco: Never     Tobacco comments:     passive in home   Substance Use Topics     Alcohol use: Yes     Alcohol/week: 0.0 standard drinks of alcohol     Comment: 1 per week            Family History   Adopted: Yes   Problem Relation Age of Onset     Unknown/Adopted Mother      Unknown/Adopted Father                Allergies   Allergen Reactions     Nkda [No Known Drug Allergy]             Current Outpatient Medications   Medication Sig Dispense Refill     atorvastatin (LIPITOR) 20 MG tablet Take 1 tablet (20 mg) by mouth daily 90 tablet 3     lisinopril (ZESTRIL) 10 MG tablet Take 1 tablet (10 mg) by mouth daily. 90 tablet 0     metFORMIN (GLUCOPHAGE XR) 500 MG 24 hr tablet Take 2 tablets (1,000 mg) by mouth 2 times  daily (with meals) 360 tablet 2     omeprazole (PRILOSEC) 40 MG DR capsule TAKE 1 CAPSULE BY MOUTH EVERY DAY 90 capsule 1     sildenafil (VIAGRA) 100 MG tablet Take 1 tablet (100 mg) by mouth daily as needed 30 min to 4 hrs before sex. Do not use with nitroglycerin, terazosin or doxazosin. (Patient not taking: Reported on 9/30/2024) 6 tablet 1          Review Of Systems  Skin: negative  Eyes: negative  Ears/Nose/Throat: negative  Respiratory: No shortness of breath, dyspnea on exertion, cough, or hemoptysis    O: Physical Exam:  Pain to palpation medial compartment/medial joint line R knee.  Adequate knee flexion and extension. CMS intact to RLE.  No wounds identified.    Lab:Hgb A1C 7.3, Cr 0.96    Images:  XR KNEE BILATERAL G/E 4 VIEWS   6/17/2024 12:08 PM      HISTORY: Primary osteoarthritis of both knees  COMPARISON: 10/25/22.                                                                       IMPRESSION:      RIGHT KNEE: There is advanced medial compartment and mild  patellofemoral compartment degenerative arthrosis. No acute fracture  is identified. No sizable joint effusion. Small chronic ossicle  projects inferior to the patella on the lateral view.     LEFT KNEE: There is moderate to advanced medial and mild  patellofemoral compartment degenerative arthrosis. No acute fracture  is identified. No sizable joint effusion.          Narrative & Impression   MR right knee without contrast 7/8/2024 6:15 PM     Techniques: Multiplanar multisequence imaging of the right knee was  obtained without administration of intra-articular or intravenous  contrast using routing protocol.     History: Primary osteoarthritis of both knees     Comparison: Radiographs dated 6/17/2024     Findings:     MENISCI:  Medial meniscus: Complex degenerative tearing of the medial meniscus  with complete extrusion.  Lateral meniscus: Intact.     LIGAMENTS  Cruciate ligaments: Mucoid degeneration of the ACL could represent  sequela of  prior injury. Posterior cruciate ligament is intact and  unremarkable.  Medial supporting structures: Bowing of the medial supporting  structures due to meniscal extrusion. The deep meniscal femoral and  meniscotibial ligaments as well as the superficial medial collateral  ligament are intact.  Lateral supporting structures: Iliotibial band, lateral collateral  ligament, biceps femoris and popliteus tendons are intact and  unremarkable.     EXTENSOR MECHANISM  Intact.  Loose body in Hoffa's fat pad.     FLUID  No joint effusion. No substantial Baker's cyst.     OSSEOUS and ARTICULAR STRUCTURES  Bones: No fracture, contusion, or osseous lesion is seen.     Patellofemoral compartment: No significant hyaline cartilage disease.     Medial compartment: Full-thickness articular cartilage loss in the  central weightbearing aspect of the entire medial compartment. This is  consistent this grade IV chondromalacia.     Lateral compartment: No significant hyaline cartilage disease.     ANCILLARY FINDINGS  Cystic changes in the intercondylar notch. Significant varicose  changes at the medial aspect of the joint.                                                                      Impression:  1. Complex degenerative tearing of the medial meniscus, which is  completely extruded, creating a meniscal deficient medial compartment.  2. Full-thickness articular cartilage loss in the central  weightbearing aspect of the entire medial compartment, grade IV  chondromalacia.  3. Mucoid degeneration of the ACL, cystic changes within the  intercondylar notch, this could be sequela of prior injury.  4. Moderate joint effusion.  5. Significant dilated venous varicosis at the medial aspect of the  joint.  6. Largely preserved lateral and patellofemoral compartments.  7. Small loose body in Hoffa's fat pad.          A:  Medial compartment arthrosis R knee      P:  proceed with unicompartmental arthroplasty R knee medial compartment if  confirmed by arthroscopy  Discussed risks/benefits/possible complications/procedure/postop rehab  No contra indications at this time  See back at time of procedure             In addition to the above assessment and plan each active problem on Clarence's problem list was evaluated today. This included the questioning of Clarence for any medication problems. We will continue the current treatment plan for these active problems except as noted.        Patient optimized to be a fast track for his upcoming TKA. Dr. Reddy in agreement. Patient educated and denies further questions at this time.     Will update the appropriate staff of fast track.     Alicia Alba RN   [facility]       Again, thank you for allowing me to participate in the care of your patient.        Sincerely,        Elia Reddy MD

## 2024-09-30 NOTE — PROGRESS NOTES
S: Patient having issues with  both knees but today right knee is most symptomatic.  Ready to proceed with unicompartmental arthroplasty R knee.          Patient Active Problem List   Diagnosis    Lumbago    CARDIOVASCULAR SCREENING; LDL GOAL LESS THAN 130    Superficial phlebitis of right leg    Varicose veins of legs    Nephrolithiasis    Benign essential hypertension    Primary osteoarthritis of both knees    Type 2 diabetes mellitus with hyperglycemia, without long-term current use of insulin (H)            Past Medical History:   Diagnosis Date    Nephrolithiasis     Plantar fascial fibromatosis             Past Surgical History:   Procedure Laterality Date    CARPAL TUNNEL RELEASE RT/LT Left 03/05/2020    HC LAP,INGUINAL HERNIA REPR,INITIAL      x2, each side    SURGICAL HISTORY OF -       Right elbow screw due to fracture    ZZC APPENDECTOMY  1972            Social History     Tobacco Use    Smoking status: Never     Passive exposure: Yes    Smokeless tobacco: Never    Tobacco comments:     passive in home   Substance Use Topics    Alcohol use: Yes     Alcohol/week: 0.0 standard drinks of alcohol     Comment: 1 per week            Family History   Adopted: Yes   Problem Relation Age of Onset    Unknown/Adopted Mother     Unknown/Adopted Father                Allergies   Allergen Reactions    Nkda [No Known Drug Allergy]             Current Outpatient Medications   Medication Sig Dispense Refill    atorvastatin (LIPITOR) 20 MG tablet Take 1 tablet (20 mg) by mouth daily 90 tablet 3    lisinopril (ZESTRIL) 10 MG tablet Take 1 tablet (10 mg) by mouth daily. 90 tablet 0    metFORMIN (GLUCOPHAGE XR) 500 MG 24 hr tablet Take 2 tablets (1,000 mg) by mouth 2 times daily (with meals) 360 tablet 2    omeprazole (PRILOSEC) 40 MG DR capsule TAKE 1 CAPSULE BY MOUTH EVERY DAY 90 capsule 1    sildenafil (VIAGRA) 100 MG tablet Take 1 tablet (100 mg) by mouth daily as needed 30 min to 4 hrs before sex. Do not use with  nitroglycerin, terazosin or doxazosin. (Patient not taking: Reported on 9/30/2024) 6 tablet 1          Review Of Systems  Skin: negative  Eyes: negative  Ears/Nose/Throat: negative  Respiratory: No shortness of breath, dyspnea on exertion, cough, or hemoptysis    O: Physical Exam:  Pain to palpation medial compartment/medial joint line R knee.  Adequate knee flexion and extension. CMS intact to RLE.  No wounds identified.    Lab:Hgb A1C 7.3, Cr 0.96    Images:  XR KNEE BILATERAL G/E 4 VIEWS   6/17/2024 12:08 PM      HISTORY: Primary osteoarthritis of both knees  COMPARISON: 10/25/22.                                                                       IMPRESSION:      RIGHT KNEE: There is advanced medial compartment and mild  patellofemoral compartment degenerative arthrosis. No acute fracture  is identified. No sizable joint effusion. Small chronic ossicle  projects inferior to the patella on the lateral view.     LEFT KNEE: There is moderate to advanced medial and mild  patellofemoral compartment degenerative arthrosis. No acute fracture  is identified. No sizable joint effusion.          Narrative & Impression   MR right knee without contrast 7/8/2024 6:15 PM     Techniques: Multiplanar multisequence imaging of the right knee was  obtained without administration of intra-articular or intravenous  contrast using routing protocol.     History: Primary osteoarthritis of both knees     Comparison: Radiographs dated 6/17/2024     Findings:     MENISCI:  Medial meniscus: Complex degenerative tearing of the medial meniscus  with complete extrusion.  Lateral meniscus: Intact.     LIGAMENTS  Cruciate ligaments: Mucoid degeneration of the ACL could represent  sequela of prior injury. Posterior cruciate ligament is intact and  unremarkable.  Medial supporting structures: Bowing of the medial supporting  structures due to meniscal extrusion. The deep meniscal femoral and  meniscotibial ligaments as well as the superficial  medial collateral  ligament are intact.  Lateral supporting structures: Iliotibial band, lateral collateral  ligament, biceps femoris and popliteus tendons are intact and  unremarkable.     EXTENSOR MECHANISM  Intact.  Loose body in Hoffa's fat pad.     FLUID  No joint effusion. No substantial Baker's cyst.     OSSEOUS and ARTICULAR STRUCTURES  Bones: No fracture, contusion, or osseous lesion is seen.     Patellofemoral compartment: No significant hyaline cartilage disease.     Medial compartment: Full-thickness articular cartilage loss in the  central weightbearing aspect of the entire medial compartment. This is  consistent this grade IV chondromalacia.     Lateral compartment: No significant hyaline cartilage disease.     ANCILLARY FINDINGS  Cystic changes in the intercondylar notch. Significant varicose  changes at the medial aspect of the joint.                                                                      Impression:  1. Complex degenerative tearing of the medial meniscus, which is  completely extruded, creating a meniscal deficient medial compartment.  2. Full-thickness articular cartilage loss in the central  weightbearing aspect of the entire medial compartment, grade IV  chondromalacia.  3. Mucoid degeneration of the ACL, cystic changes within the  intercondylar notch, this could be sequela of prior injury.  4. Moderate joint effusion.  5. Significant dilated venous varicosis at the medial aspect of the  joint.  6. Largely preserved lateral and patellofemoral compartments.  7. Small loose body in Hoffa's fat pad.          A:  Medial compartment arthrosis R knee      P:  proceed with unicompartmental arthroplasty R knee medial compartment if confirmed by arthroscopy  Discussed risks/benefits/possible complications/procedure/postop rehab  No contra indications at this time  See back at time of procedure             In addition to the above assessment and plan each active problem on Clarence's problem  list was evaluated today. This included the questioning of Clarence for any medication problems. We will continue the current treatment plan for these active problems except as noted.

## 2024-09-30 NOTE — PROGRESS NOTES
Patient optimized to be a fast track for his upcoming TKA. Dr. Reddy in agreement. Patient educated and denies further questions at this time.     Will update the appropriate staff of fast track.     Alicia Alba RN   [facility]

## 2024-10-02 ENCOUNTER — ANESTHESIA EVENT (OUTPATIENT)
Dept: SURGERY | Facility: CLINIC | Age: 64
End: 2024-10-02
Payer: COMMERCIAL

## 2024-10-03 ENCOUNTER — APPOINTMENT (OUTPATIENT)
Dept: GENERAL RADIOLOGY | Facility: CLINIC | Age: 64
End: 2024-10-03
Attending: PHYSICIAN ASSISTANT
Payer: COMMERCIAL

## 2024-10-03 ENCOUNTER — ANESTHESIA (OUTPATIENT)
Dept: SURGERY | Facility: CLINIC | Age: 64
End: 2024-10-03
Payer: COMMERCIAL

## 2024-10-03 ENCOUNTER — HOSPITAL ENCOUNTER (OUTPATIENT)
Facility: CLINIC | Age: 64
Discharge: HOME | End: 2024-10-03
Attending: ORTHOPAEDIC SURGERY | Admitting: ORTHOPAEDIC SURGERY
Payer: COMMERCIAL

## 2024-10-03 ENCOUNTER — APPOINTMENT (OUTPATIENT)
Dept: PHYSICAL THERAPY | Facility: CLINIC | Age: 64
End: 2024-10-03
Attending: ORTHOPAEDIC SURGERY
Payer: COMMERCIAL

## 2024-10-03 VITALS
TEMPERATURE: 97.8 F | OXYGEN SATURATION: 94 % | BODY MASS INDEX: 29.95 KG/M2 | DIASTOLIC BLOOD PRESSURE: 90 MMHG | SYSTOLIC BLOOD PRESSURE: 131 MMHG | HEART RATE: 66 BPM | WEIGHT: 202.82 LBS | RESPIRATION RATE: 13 BRPM

## 2024-10-03 DIAGNOSIS — M17.10 ARTHRITIS OF KNEE: ICD-10-CM

## 2024-10-03 DIAGNOSIS — Z96.651 HISTORY OF UNICONDYLAR ARTHROPLASTY OF RIGHT KNEE: Primary | ICD-10-CM

## 2024-10-03 DIAGNOSIS — I10 BENIGN ESSENTIAL HYPERTENSION: ICD-10-CM

## 2024-10-03 LAB
GLUCOSE BLDC GLUCOMTR-MCNC: 136 MG/DL (ref 70–99)
GLUCOSE BLDC GLUCOMTR-MCNC: 139 MG/DL (ref 70–99)

## 2024-10-03 PROCEDURE — 97162 PT EVAL MOD COMPLEX 30 MIN: CPT | Mod: GP | Performed by: PHYSICAL THERAPIST

## 2024-10-03 PROCEDURE — 250N000011 HC RX IP 250 OP 636: Performed by: ORTHOPAEDIC SURGERY

## 2024-10-03 PROCEDURE — 250N000009 HC RX 250: Performed by: NURSE ANESTHETIST, CERTIFIED REGISTERED

## 2024-10-03 PROCEDURE — 710N000010 HC RECOVERY PHASE 1, LEVEL 2, PER MIN: Performed by: ORTHOPAEDIC SURGERY

## 2024-10-03 PROCEDURE — 272N000001 HC OR GENERAL SUPPLY STERILE: Performed by: ORTHOPAEDIC SURGERY

## 2024-10-03 PROCEDURE — 73560 X-RAY EXAM OF KNEE 1 OR 2: CPT | Mod: RT

## 2024-10-03 PROCEDURE — 258N000003 HC RX IP 258 OP 636: Performed by: NURSE ANESTHETIST, CERTIFIED REGISTERED

## 2024-10-03 PROCEDURE — 999N000065 XR KNEE PORT RIGHT 1/2 VIEWS

## 2024-10-03 PROCEDURE — 250N000009 HC RX 250: Performed by: ORTHOPAEDIC SURGERY

## 2024-10-03 PROCEDURE — 258N000003 HC RX IP 258 OP 636: Performed by: PHYSICIAN ASSISTANT

## 2024-10-03 PROCEDURE — 258N000001 HC RX 258: Performed by: ORTHOPAEDIC SURGERY

## 2024-10-03 PROCEDURE — 370N000017 HC ANESTHESIA TECHNICAL FEE, PER MIN: Performed by: ORTHOPAEDIC SURGERY

## 2024-10-03 PROCEDURE — 360N000077 HC SURGERY LEVEL 4, PER MIN: Performed by: ORTHOPAEDIC SURGERY

## 2024-10-03 PROCEDURE — 97530 THERAPEUTIC ACTIVITIES: CPT | Mod: GP | Performed by: PHYSICAL THERAPIST

## 2024-10-03 PROCEDURE — 82962 GLUCOSE BLOOD TEST: CPT

## 2024-10-03 PROCEDURE — 250N000011 HC RX IP 250 OP 636: Performed by: NURSE ANESTHETIST, CERTIFIED REGISTERED

## 2024-10-03 PROCEDURE — C1776 JOINT DEVICE (IMPLANTABLE): HCPCS | Performed by: ORTHOPAEDIC SURGERY

## 2024-10-03 PROCEDURE — C1713 ANCHOR/SCREW BN/BN,TIS/BN: HCPCS | Performed by: ORTHOPAEDIC SURGERY

## 2024-10-03 PROCEDURE — 27446 REVISION OF KNEE JOINT: CPT | Mod: RT | Performed by: ORTHOPAEDIC SURGERY

## 2024-10-03 PROCEDURE — 250N000013 HC RX MED GY IP 250 OP 250 PS 637: Performed by: PHYSICIAN ASSISTANT

## 2024-10-03 PROCEDURE — 999N000141 HC STATISTIC PRE-PROCEDURE NURSING ASSESSMENT: Performed by: ORTHOPAEDIC SURGERY

## 2024-10-03 PROCEDURE — 250N000013 HC RX MED GY IP 250 OP 250 PS 637: Performed by: ORTHOPAEDIC SURGERY

## 2024-10-03 PROCEDURE — 97116 GAIT TRAINING THERAPY: CPT | Mod: GP | Performed by: PHYSICAL THERAPIST

## 2024-10-03 PROCEDURE — 97110 THERAPEUTIC EXERCISES: CPT | Mod: GP | Performed by: PHYSICAL THERAPIST

## 2024-10-03 PROCEDURE — 27446 REVISION OF KNEE JOINT: CPT | Mod: AS | Performed by: PHYSICIAN ASSISTANT

## 2024-10-03 DEVICE — BONE CEMENT SIMPLEX FULL DOSE 6191-1-001: Type: IMPLANTABLE DEVICE | Site: KNEE | Status: FUNCTIONAL

## 2024-10-03 DEVICE — IMPLANTABLE DEVICE: Type: IMPLANTABLE DEVICE | Site: KNEE | Status: FUNCTIONAL

## 2024-10-03 RX ORDER — NALOXONE HYDROCHLORIDE 0.4 MG/ML
0.2 INJECTION, SOLUTION INTRAMUSCULAR; INTRAVENOUS; SUBCUTANEOUS
Status: DISCONTINUED | OUTPATIENT
Start: 2024-10-03 | End: 2024-10-03 | Stop reason: HOSPADM

## 2024-10-03 RX ORDER — METOPROLOL TARTRATE 1 MG/ML
1-2 INJECTION, SOLUTION INTRAVENOUS EVERY 5 MIN PRN
Status: DISCONTINUED | OUTPATIENT
Start: 2024-10-03 | End: 2024-10-03 | Stop reason: HOSPADM

## 2024-10-03 RX ORDER — SODIUM CHLORIDE 9 MG/ML
INJECTION, SOLUTION INTRAVENOUS CONTINUOUS
Status: DISCONTINUED | OUTPATIENT
Start: 2024-10-03 | End: 2024-10-03 | Stop reason: HOSPADM

## 2024-10-03 RX ORDER — BUPIVACAINE HYDROCHLORIDE 2.5 MG/ML
INJECTION, SOLUTION INFILTRATION; PERINEURAL PRN
Status: DISCONTINUED | OUTPATIENT
Start: 2024-10-03 | End: 2024-10-03 | Stop reason: HOSPADM

## 2024-10-03 RX ORDER — NALOXONE HYDROCHLORIDE 0.4 MG/ML
0.4 INJECTION, SOLUTION INTRAMUSCULAR; INTRAVENOUS; SUBCUTANEOUS
Status: DISCONTINUED | OUTPATIENT
Start: 2024-10-03 | End: 2024-10-03 | Stop reason: HOSPADM

## 2024-10-03 RX ORDER — ACETAMINOPHEN 325 MG/1
650 TABLET ORAL EVERY 4 HOURS PRN
Status: DISCONTINUED | OUTPATIENT
Start: 2024-10-06 | End: 2024-10-03 | Stop reason: HOSPADM

## 2024-10-03 RX ORDER — FAMOTIDINE 20 MG/1
20 TABLET, FILM COATED ORAL 2 TIMES DAILY
Status: DISCONTINUED | OUTPATIENT
Start: 2024-10-03 | End: 2024-10-03 | Stop reason: HOSPADM

## 2024-10-03 RX ORDER — DEXAMETHASONE SODIUM PHOSPHATE 10 MG/ML
4 INJECTION, SOLUTION INTRAMUSCULAR; INTRAVENOUS
Status: DISCONTINUED | OUTPATIENT
Start: 2024-10-03 | End: 2024-10-03 | Stop reason: HOSPADM

## 2024-10-03 RX ORDER — LIDOCAINE HYDROCHLORIDE 20 MG/ML
INJECTION, SOLUTION INFILTRATION; PERINEURAL PRN
Status: DISCONTINUED | OUTPATIENT
Start: 2024-10-03 | End: 2024-10-03

## 2024-10-03 RX ORDER — PROPOFOL 10 MG/ML
INJECTION, EMULSION INTRAVENOUS CONTINUOUS PRN
Status: DISCONTINUED | OUTPATIENT
Start: 2024-10-03 | End: 2024-10-03

## 2024-10-03 RX ORDER — NALOXONE HYDROCHLORIDE 0.4 MG/ML
0.1 INJECTION, SOLUTION INTRAMUSCULAR; INTRAVENOUS; SUBCUTANEOUS
Status: DISCONTINUED | OUTPATIENT
Start: 2024-10-03 | End: 2024-10-03 | Stop reason: HOSPADM

## 2024-10-03 RX ORDER — ONDANSETRON 4 MG/1
4 TABLET, ORALLY DISINTEGRATING ORAL EVERY 6 HOURS PRN
Status: DISCONTINUED | OUTPATIENT
Start: 2024-10-03 | End: 2024-10-03 | Stop reason: HOSPADM

## 2024-10-03 RX ORDER — ACETAMINOPHEN 325 MG/1
650 TABLET ORAL EVERY 4 HOURS PRN
Qty: 100 TABLET | Refills: 0 | Status: SHIPPED | OUTPATIENT
Start: 2024-10-03

## 2024-10-03 RX ORDER — ASPIRIN 81 MG/1
81 TABLET ORAL 2 TIMES DAILY
Qty: 84 TABLET | Refills: 0 | Status: SHIPPED | OUTPATIENT
Start: 2024-10-04 | End: 2024-11-15

## 2024-10-03 RX ORDER — CELECOXIB 100 MG/1
200 CAPSULE ORAL 2 TIMES DAILY
Status: DISCONTINUED | OUTPATIENT
Start: 2024-10-03 | End: 2024-10-03 | Stop reason: HOSPADM

## 2024-10-03 RX ORDER — METFORMIN HYDROCHLORIDE 500 MG/1
1000 TABLET, EXTENDED RELEASE ORAL 2 TIMES DAILY WITH MEALS
Status: DISCONTINUED | OUTPATIENT
Start: 2024-10-03 | End: 2024-10-03 | Stop reason: HOSPADM

## 2024-10-03 RX ORDER — CEFAZOLIN SODIUM/WATER 2 G/20 ML
2 SYRINGE (ML) INTRAVENOUS
Status: COMPLETED | OUTPATIENT
Start: 2024-10-03 | End: 2024-10-03

## 2024-10-03 RX ORDER — TRANEXAMIC ACID 650 MG/1
1950 TABLET ORAL ONCE
Status: COMPLETED | OUTPATIENT
Start: 2024-10-03 | End: 2024-10-03

## 2024-10-03 RX ORDER — OXYCODONE HYDROCHLORIDE 5 MG/1
5 TABLET ORAL EVERY 4 HOURS PRN
Status: DISCONTINUED | OUTPATIENT
Start: 2024-10-03 | End: 2024-10-03 | Stop reason: HOSPADM

## 2024-10-03 RX ORDER — POLYETHYLENE GLYCOL 3350 17 G/17G
17 POWDER, FOR SOLUTION ORAL DAILY
Status: DISCONTINUED | OUTPATIENT
Start: 2024-10-04 | End: 2024-10-03 | Stop reason: HOSPADM

## 2024-10-03 RX ORDER — SODIUM CHLORIDE, SODIUM LACTATE, POTASSIUM CHLORIDE, CALCIUM CHLORIDE 600; 310; 30; 20 MG/100ML; MG/100ML; MG/100ML; MG/100ML
INJECTION, SOLUTION INTRAVENOUS CONTINUOUS
Status: DISCONTINUED | OUTPATIENT
Start: 2024-10-03 | End: 2024-10-03 | Stop reason: HOSPADM

## 2024-10-03 RX ORDER — HYDROXYZINE HYDROCHLORIDE 25 MG/1
25 TABLET, FILM COATED ORAL EVERY 6 HOURS PRN
Status: DISCONTINUED | OUTPATIENT
Start: 2024-10-03 | End: 2024-10-03 | Stop reason: HOSPADM

## 2024-10-03 RX ORDER — ONDANSETRON 2 MG/ML
4 INJECTION INTRAMUSCULAR; INTRAVENOUS EVERY 6 HOURS PRN
Status: DISCONTINUED | OUTPATIENT
Start: 2024-10-03 | End: 2024-10-03 | Stop reason: HOSPADM

## 2024-10-03 RX ORDER — CEFAZOLIN SODIUM/WATER 2 G/20 ML
2 SYRINGE (ML) INTRAVENOUS EVERY 8 HOURS
Status: DISCONTINUED | OUTPATIENT
Start: 2024-10-03 | End: 2024-10-03 | Stop reason: HOSPADM

## 2024-10-03 RX ORDER — FENTANYL CITRATE 50 UG/ML
INJECTION, SOLUTION INTRAMUSCULAR; INTRAVENOUS PRN
Status: DISCONTINUED | OUTPATIENT
Start: 2024-10-03 | End: 2024-10-03

## 2024-10-03 RX ORDER — HYDROMORPHONE HCL IN WATER/PF 6 MG/30 ML
0.2 PATIENT CONTROLLED ANALGESIA SYRINGE INTRAVENOUS
Status: DISCONTINUED | OUTPATIENT
Start: 2024-10-03 | End: 2024-10-03 | Stop reason: HOSPADM

## 2024-10-03 RX ORDER — PROPOFOL 10 MG/ML
INJECTION, EMULSION INTRAVENOUS PRN
Status: DISCONTINUED | OUTPATIENT
Start: 2024-10-03 | End: 2024-10-03

## 2024-10-03 RX ORDER — CALCIUM CARBONATE 500 MG/1
500 TABLET, CHEWABLE ORAL 4 TIMES DAILY PRN
Status: DISCONTINUED | OUTPATIENT
Start: 2024-10-03 | End: 2024-10-03 | Stop reason: HOSPADM

## 2024-10-03 RX ORDER — HYDROMORPHONE HCL IN WATER/PF 6 MG/30 ML
0.4 PATIENT CONTROLLED ANALGESIA SYRINGE INTRAVENOUS EVERY 5 MIN PRN
Status: DISCONTINUED | OUTPATIENT
Start: 2024-10-03 | End: 2024-10-03 | Stop reason: HOSPADM

## 2024-10-03 RX ORDER — BUPIVACAINE HYDROCHLORIDE 7.5 MG/ML
INJECTION, SOLUTION INTRASPINAL PRN
Status: DISCONTINUED | OUTPATIENT
Start: 2024-10-03 | End: 2024-10-03

## 2024-10-03 RX ORDER — PROCHLORPERAZINE MALEATE 5 MG/1
10 TABLET ORAL EVERY 6 HOURS PRN
Status: DISCONTINUED | OUTPATIENT
Start: 2024-10-03 | End: 2024-10-03 | Stop reason: HOSPADM

## 2024-10-03 RX ORDER — ACETAMINOPHEN 325 MG/1
975 TABLET ORAL EVERY 8 HOURS
Status: DISCONTINUED | OUTPATIENT
Start: 2024-10-03 | End: 2024-10-03 | Stop reason: HOSPADM

## 2024-10-03 RX ORDER — OXYCODONE HYDROCHLORIDE 5 MG/1
10 TABLET ORAL EVERY 4 HOURS PRN
Status: DISCONTINUED | OUTPATIENT
Start: 2024-10-03 | End: 2024-10-03 | Stop reason: HOSPADM

## 2024-10-03 RX ORDER — MEPERIDINE HYDROCHLORIDE 25 MG/ML
12.5 INJECTION INTRAMUSCULAR; INTRAVENOUS; SUBCUTANEOUS EVERY 5 MIN PRN
Status: DISCONTINUED | OUTPATIENT
Start: 2024-10-03 | End: 2024-10-03 | Stop reason: HOSPADM

## 2024-10-03 RX ORDER — HYDRALAZINE HYDROCHLORIDE 20 MG/ML
2.5-5 INJECTION INTRAMUSCULAR; INTRAVENOUS EVERY 10 MIN PRN
Status: DISCONTINUED | OUTPATIENT
Start: 2024-10-03 | End: 2024-10-03 | Stop reason: HOSPADM

## 2024-10-03 RX ORDER — PREGABALIN 25 MG/1
75 CAPSULE ORAL 2 TIMES DAILY
Status: DISCONTINUED | OUTPATIENT
Start: 2024-10-03 | End: 2024-10-03 | Stop reason: HOSPADM

## 2024-10-03 RX ORDER — ALBUTEROL SULFATE 0.83 MG/ML
2.5 SOLUTION RESPIRATORY (INHALATION) EVERY 4 HOURS PRN
Status: DISCONTINUED | OUTPATIENT
Start: 2024-10-03 | End: 2024-10-03 | Stop reason: HOSPADM

## 2024-10-03 RX ORDER — LIDOCAINE 40 MG/G
CREAM TOPICAL
Status: DISCONTINUED | OUTPATIENT
Start: 2024-10-03 | End: 2024-10-03 | Stop reason: HOSPADM

## 2024-10-03 RX ORDER — ASPIRIN 81 MG/1
81 TABLET ORAL 2 TIMES DAILY
Status: DISCONTINUED | OUTPATIENT
Start: 2024-10-03 | End: 2024-10-03 | Stop reason: HOSPADM

## 2024-10-03 RX ORDER — BUPIVACAINE HYDROCHLORIDE AND EPINEPHRINE 5; 5 MG/ML; UG/ML
INJECTION, SOLUTION PERINEURAL PRN
Status: DISCONTINUED | OUTPATIENT
Start: 2024-10-03 | End: 2024-10-03

## 2024-10-03 RX ORDER — BISACODYL 10 MG
10 SUPPOSITORY, RECTAL RECTAL DAILY PRN
Status: DISCONTINUED | OUTPATIENT
Start: 2024-10-03 | End: 2024-10-03 | Stop reason: HOSPADM

## 2024-10-03 RX ORDER — PANTOPRAZOLE SODIUM 40 MG/1
40 TABLET, DELAYED RELEASE ORAL DAILY
Status: DISCONTINUED | OUTPATIENT
Start: 2024-10-04 | End: 2024-10-03 | Stop reason: HOSPADM

## 2024-10-03 RX ORDER — ATORVASTATIN CALCIUM 10 MG/1
20 TABLET, FILM COATED ORAL DAILY
Status: DISCONTINUED | OUTPATIENT
Start: 2024-10-04 | End: 2024-10-03 | Stop reason: HOSPADM

## 2024-10-03 RX ORDER — ONDANSETRON 2 MG/ML
INJECTION INTRAMUSCULAR; INTRAVENOUS PRN
Status: DISCONTINUED | OUTPATIENT
Start: 2024-10-03 | End: 2024-10-03

## 2024-10-03 RX ORDER — OXYCODONE HYDROCHLORIDE 5 MG/1
5-10 TABLET ORAL EVERY 4 HOURS PRN
Qty: 33 TABLET | Refills: 0 | Status: SHIPPED | OUTPATIENT
Start: 2024-10-03

## 2024-10-03 RX ORDER — AMOXICILLIN 250 MG
1 CAPSULE ORAL 2 TIMES DAILY
Status: DISCONTINUED | OUTPATIENT
Start: 2024-10-03 | End: 2024-10-03 | Stop reason: HOSPADM

## 2024-10-03 RX ORDER — AMOXICILLIN 250 MG
1-2 CAPSULE ORAL 2 TIMES DAILY
Qty: 30 TABLET | Refills: 1 | Status: SHIPPED | OUTPATIENT
Start: 2024-10-03

## 2024-10-03 RX ORDER — CEFAZOLIN SODIUM/WATER 2 G/20 ML
2 SYRINGE (ML) INTRAVENOUS SEE ADMIN INSTRUCTIONS
Status: DISCONTINUED | OUTPATIENT
Start: 2024-10-03 | End: 2024-10-03 | Stop reason: HOSPADM

## 2024-10-03 RX ORDER — DEXAMETHASONE SODIUM PHOSPHATE 4 MG/ML
INJECTION, SOLUTION INTRA-ARTICULAR; INTRALESIONAL; INTRAMUSCULAR; INTRAVENOUS; SOFT TISSUE PRN
Status: DISCONTINUED | OUTPATIENT
Start: 2024-10-03 | End: 2024-10-03

## 2024-10-03 RX ORDER — HYDROMORPHONE HCL IN WATER/PF 6 MG/30 ML
0.4 PATIENT CONTROLLED ANALGESIA SYRINGE INTRAVENOUS
Status: DISCONTINUED | OUTPATIENT
Start: 2024-10-03 | End: 2024-10-03 | Stop reason: HOSPADM

## 2024-10-03 RX ORDER — ONDANSETRON 4 MG/1
4 TABLET, ORALLY DISINTEGRATING ORAL EVERY 30 MIN PRN
Status: DISCONTINUED | OUTPATIENT
Start: 2024-10-03 | End: 2024-10-03 | Stop reason: HOSPADM

## 2024-10-03 RX ORDER — DIMENHYDRINATE 50 MG/ML
INJECTION, SOLUTION INTRAMUSCULAR; INTRAVENOUS PRN
Status: DISCONTINUED | OUTPATIENT
Start: 2024-10-03 | End: 2024-10-03

## 2024-10-03 RX ORDER — ONDANSETRON 2 MG/ML
4 INJECTION INTRAMUSCULAR; INTRAVENOUS EVERY 30 MIN PRN
Status: DISCONTINUED | OUTPATIENT
Start: 2024-10-03 | End: 2024-10-03 | Stop reason: HOSPADM

## 2024-10-03 RX ORDER — FENTANYL CITRATE 50 UG/ML
50 INJECTION, SOLUTION INTRAMUSCULAR; INTRAVENOUS EVERY 5 MIN PRN
Status: DISCONTINUED | OUTPATIENT
Start: 2024-10-03 | End: 2024-10-03 | Stop reason: HOSPADM

## 2024-10-03 RX ADMIN — SODIUM CHLORIDE, POTASSIUM CHLORIDE, SODIUM LACTATE AND CALCIUM CHLORIDE: 600; 310; 30; 20 INJECTION, SOLUTION INTRAVENOUS at 10:54

## 2024-10-03 RX ADMIN — CELECOXIB 200 MG: 100 CAPSULE ORAL at 13:33

## 2024-10-03 RX ADMIN — Medication 2 G: at 07:46

## 2024-10-03 RX ADMIN — DIMENHYDRINATE 25 MG: 50 INJECTION, SOLUTION INTRAMUSCULAR; INTRAVENOUS at 08:32

## 2024-10-03 RX ADMIN — ACETAMINOPHEN 975 MG: 325 TABLET ORAL at 13:32

## 2024-10-03 RX ADMIN — SENNOSIDES AND DOCUSATE SODIUM 1 TABLET: 8.6; 5 TABLET ORAL at 13:33

## 2024-10-03 RX ADMIN — FENTANYL CITRATE 12.5 MCG: 50 INJECTION, SOLUTION INTRAMUSCULAR; INTRAVENOUS at 07:58

## 2024-10-03 RX ADMIN — BUPIVACAINE HYDROCHLORIDE AND EPINEPHRINE BITARTRATE 20 ML: 5; .005 INJECTION, SOLUTION PERINEURAL at 11:13

## 2024-10-03 RX ADMIN — PROPOFOL 100 MCG/KG/MIN: 10 INJECTION, EMULSION INTRAVENOUS at 07:52

## 2024-10-03 RX ADMIN — ONDANSETRON 4 MG: 2 INJECTION INTRAMUSCULAR; INTRAVENOUS at 07:52

## 2024-10-03 RX ADMIN — DEXAMETHASONE SODIUM PHOSPHATE 10 MG: 4 INJECTION, SOLUTION INTRA-ARTICULAR; INTRALESIONAL; INTRAMUSCULAR; INTRAVENOUS; SOFT TISSUE at 11:13

## 2024-10-03 RX ADMIN — BUPIVACAINE HYDROCHLORIDE IN DEXTROSE 2 ML: 7.5 INJECTION, SOLUTION SUBARACHNOID at 07:58

## 2024-10-03 RX ADMIN — MIDAZOLAM 2 MG: 1 INJECTION INTRAMUSCULAR; INTRAVENOUS at 07:46

## 2024-10-03 RX ADMIN — TRANEXAMIC ACID 1950 MG: 650 TABLET ORAL at 06:16

## 2024-10-03 RX ADMIN — SODIUM CHLORIDE, POTASSIUM CHLORIDE, SODIUM LACTATE AND CALCIUM CHLORIDE: 600; 310; 30; 20 INJECTION, SOLUTION INTRAVENOUS at 06:41

## 2024-10-03 RX ADMIN — PHENYLEPHRINE HYDROCHLORIDE 0.3 MCG/KG/MIN: 10 INJECTION INTRAVENOUS at 08:01

## 2024-10-03 RX ADMIN — OXYCODONE HYDROCHLORIDE 5 MG: 5 TABLET ORAL at 15:53

## 2024-10-03 RX ADMIN — LIDOCAINE HYDROCHLORIDE 50 MG: 20 INJECTION, SOLUTION INFILTRATION; PERINEURAL at 07:51

## 2024-10-03 RX ADMIN — SODIUM CHLORIDE, PRESERVATIVE FREE: 5 INJECTION INTRAVENOUS at 13:44

## 2024-10-03 RX ADMIN — FAMOTIDINE 20 MG: 20 TABLET, FILM COATED ORAL at 13:33

## 2024-10-03 RX ADMIN — PROPOFOL 40 MG: 10 INJECTION, EMULSION INTRAVENOUS at 07:52

## 2024-10-03 RX ADMIN — PREGABALIN 75 MG: 25 CAPSULE ORAL at 13:33

## 2024-10-03 RX ADMIN — ASPIRIN 81 MG: 81 TABLET, COATED ORAL at 13:33

## 2024-10-03 RX ADMIN — FENTANYL CITRATE 37.5 MCG: 50 INJECTION INTRAMUSCULAR; INTRAVENOUS at 07:46

## 2024-10-03 ASSESSMENT — ACTIVITIES OF DAILY LIVING (ADL)
ADLS_ACUITY_SCORE: 21
ADLS_ACUITY_SCORE: 18
ADLS_ACUITY_SCORE: 18
ADLS_ACUITY_SCORE: 16
ADLS_ACUITY_SCORE: 18
ADLS_ACUITY_SCORE: 21
ADLS_ACUITY_SCORE: 18
ADLS_ACUITY_SCORE: 21

## 2024-10-03 ASSESSMENT — LIFESTYLE VARIABLES: TOBACCO_USE: 0

## 2024-10-03 NOTE — ANESTHESIA PROCEDURE NOTES
"Adductor canal Procedure Note    Pre-Procedure   Staff -        CRNA: Can Oropeza APRN CRNA       Other Anesthesia Staff: Hernán Yoder       Performed By: SRNA and with CRNAs       Procedure performed by resident/fellow/CRNA in presence of a teaching physician.         Location: post-op       Procedure Start/Stop Times: 10/3/2024 11:03 AM and 10/3/2024 11:13 AM       Pre-Anesthestic Checklist: patient identified, IV checked, site marked, risks and benefits discussed, informed consent, monitors and equipment checked, pre-op evaluation, at physician/surgeon's request and post-op pain management  Timeout:       Correct Patient: Yes        Correct Procedure: Yes        Correct Site: Yes        Correct Position: Yes        Correct Laterality: Yes        Site Marked: Yes  Procedure Documentation  Procedure: Adductor canal       Laterality: right       Patient Position: supine       Skin prep: Chloraprep       Needle Type: Touhy needle       Needle Gauge: 20.        Needle Length (millimeters): 100        Ultrasound guided       1. Ultrasound was used to identify targeted nerve, plexus, vascular marker, or fascial plane and place a needle adjacent to it in real-time.       2. Ultrasound was used to visualize the spread of anesthetic in close proximity to the above referenced structure.       3. A permanent image is entered into the patient's record.       4. The visualized anatomic structures appeared normal.       5. There were no apparent abnormal pathologic findings.    Assessment/Narrative         The placement was negative for: blood aspirated, painful injection and site bleeding       Paresthesias: No.    Medication(s) Administered   Bupivacaine 0.5% w/ 1:200K Epi (Other) - Other   20 mL - 10/3/2024 11:12:00 AM  Medication Administration Time: 10/3/2024 11:03 AM      FOR [facility] ([facility]) ONLY:   Pain Team Contact information: please page the Pain Team Via [employer]. Search \"Pain\". During daytime " hours, please page the attending first. At night please page the resident first.

## 2024-10-03 NOTE — ANESTHESIA PROCEDURE NOTES
Adductor canal Procedure Note    Pre-Procedure   Staff -        CRNA: Can Oropeza APRN CRNA       Other Anesthesia Staff: Hernán Yoder       Performed By: CRNA       Location: post-op       Procedure Start/Stop Times: 10/3/2024 11:03 AM and 10/3/2024 11:13 AM       Pre-Anesthestic Checklist: patient identified, IV checked, site marked, risks and benefits discussed, informed consent, monitors and equipment checked, pre-op evaluation, at physician/surgeon's request and post-op pain management  Timeout:       Correct Patient: Yes        Correct Procedure: Yes        Correct Site: Yes        Correct Position: Yes        Correct Laterality: Yes        Site Marked: Yes  Procedure Documentation  Procedure: Adductor canal       Patient Position: supine       Patient Prep/Sterile Barriers: sterile gloves, mask       Skin prep: Chloraprep       Local skin infiltrated with 3 mL of 2% lidocaine.        Needle Type: insulated       Needle Gauge: 22.        Needle Length (millimeters): 100        Ultrasound guided       1. Ultrasound was used to identify targeted nerve, plexus, vascular marker, or fascial plane and place a needle adjacent to it in real-time.       2. Ultrasound was used to visualize the spread of anesthetic in close proximity to the above referenced structure.       3. A permanent image is entered into the patient's record.       4. The visualized anatomic structures appeared normal.       5. There were no apparent abnormal pathologic findings.    Assessment/Narrative         The placement was negative for: blood aspirated, painful injection and site bleeding       Paresthesias: No.       Test dose of mL at.         Test dose negative, 3 minutes after injection, for signs of intravascular, subdural, or intrathecal injection.       Bolus given via needle..        Secured via.        Insertion/Infusion Method: Single Shot       Complications: none       Injection made incrementally with aspirations  "every 5 mL.    Medication(s) Administered   Medication Administration Time: 10/3/2024 11:03 AM     Comments:  Placed by SRNA with the assistance of CRNA.  Good visualization of the femoral artery and the local spread lateral to the artery.  No HR increase with injections and no other complications noted.  I will follow up with the pt if needed.      FOR [facility] ([facility]) ONLY:   Pain Team Contact information: please page the Pain Team Via [employer]. Search \"Pain\". During daytime hours, please page the attending first. At night please page the resident first.      "

## 2024-10-03 NOTE — PROGRESS NOTES
[facility]    64 year old male POD#1 s/p R medial unicompartmental knee arthroplasty by Dr. Reddy  *Same-day discharge*  PLAN:  1. Pain-Tylenol, oxycodone 5 to 10 mg, Lyrica 75 mg twice daily, Celebrex 200 mg twice daily   2. DVT Prophylaxis-Asprin 81mg bid x 6 weeks.   3. Weight Bearing Status-WBAT RLE  4. Physical Therapy-to see patient and evaluate this afternoon.  He has outpatient physical therapy set up on 10/7/2024  5. Incision-Aquacel on. Hemovac drain to be pulled by RN/PASunitaC per order protocol.  Pulled prior to device charge  6. Plan-Anticipate discharge this afternoon with wife to care for him.  All admission and discharge orders are in and I will reconcile all medications and put the final discharge order in after the patient is cleared by physical therapy.    TEENA AnC  10/3/2024

## 2024-10-03 NOTE — OP NOTE
Summary: Link Unicompartmental arthroplasty medial compartment R knee 52 cemented femur, 55 x 9 all poly tibia cemented     Preop DX:  OA R knee, medial compartment moderate /severe  Postop Dx: same   Procedure:  Arthroscopy Right knee followed by medial compartment unicompartmental arthroplasty. Link 52 cemented femur, 55 x 9 all poly cemented tibia R  knee.     Attending: Barry  Assist:  Ryan Arellano PA-C                                 Indications for procedure:  Patient feels he has exhausted conservative measures for Right knee including PT/bracing/ambulatory aides/injection/antiinflammtories and continues to have pain and disability Right knee primarily medial compartment.     Findings at procedure:  No Patellofemoral OA rightt knee , primarily medial compartment arthrosis R knee femur with eburnation.  Lateral compartment pristine Right knee.  ACL intact knee.    We placed 52 link femoral component cemented Right knee and 55 x 9 all poly tibial component right knee.  Patient had full range of motion, no evidence of instability.  Postop images showed anatomic orientation of components.  NO complications identified.  I spoke with patient's family in waiting area.     Procedure:  Following verbal and written consent patient taken to operating room 1.  Spinal anesthetic and block postop.  Placed supine with foot and lateral leg supports placed as well as tourniquet.  Right leg prepped and draped in sterile fashion.  Patient had exsanguination followed by tourniquet inflated.  Tourniquet R knee 130 mins . Arthroscopy was performed with knee flexed and inferior lateral portal.  Right knee  with intact articulation patellofemoral.  Lateral compartment pristine.  Medial compartment femoral eburnated surfaces direct weight bearing of medial right knee.  Primarily erosion anteromedial tibia. Median parapatellar incision 3 inches long made sharply.  Then limited subvastus reflection of synovium and retinaculum.  Ar  fibers elevated from medial patellar facet border, medial third infrapatellar fat pad excised to better visualize acl margin, osteophyte medial patellar border removed rongeur and saw.  Contact point tibia on femur marked in full extension on femur with bovie.  Osteophytes removed from medial femoral condyle.  Notch plasty performed.  Saw used to remove remaining articular cartilage from femur and to contour femur to make this more convex.  Posterior distal 5 mm femur removed perpendicular to longitudinal axis with saw and z retractor to protect MCL.  Further osteophytes removed.  Femoral guide placed and distraction pins placed.  Distracted.  Entire medial meniscus removed sharply.  Articular cartilage removed from proximal tibia with angled curette.  5.0 christy used first to fashion receiving site for tibial poly, 8 mm anterior and 12 mm posterior with about 7 degrees declination.  1-2 mm margin of bone rim left medially.  3 mm christy then used to square margins.  Arthroscopy hook used for posterior margin identification. [place] used to penetrate posterolateral cortex medial inlay site to accommodate ap diameter poly.  Inlay placed with good fit and lightly impacted in place.  This then removed and 1/4 plain marcaine placed posterior capsule and along periphery.  Femur then exposed and guide placed to ream for femoral component pegs.  Femur had also been contoured with christy posteriorly and anterior distal femur.  Femoral component impacted.  Tibial component placed.  Leg taken through full range of motion.  All surfaces prepared for cement once inspected for entire meniscal remnants removed ensured.  Tibial component impacted in place and all free cement removed followed by femoral component.  We used 52 femoral component and 55 x 9 tibial all poly component after removing keel.  All free cement removed.  Betadine soak for 3 minutes.  Irrigated with simpulse lavage and medium hemovac drain placed.  Synovium closed with  2-0 vicryl in a running fashion.  Retinaculum closed with #1 vicryl running followed by #1 stratofix for water tight seal.  Deep layer closed with #1 vicryl interrupted figure of 8, intermediate 0 vicryl and superficial 2-0 vicryl.  Epithelium closed with 3-0 monocryl in running subcuticular fashion.Knee flexed to 90 degrees and exofin placed.  Xeroform.  Aquacel, sterile cast padding and modified cooper dressing with thigh high JULIAN.  No complications identified.  Patient taken to recovery without incident and I spoke with family in waiting area.      Ryan Arellano PA-C required for surgical assistance holding extremity and retractors, helped with closure.anatomic orientation of components and no complications identified with postop images.   I was present entire procedure.

## 2024-10-03 NOTE — PROGRESS NOTES
10/03/24 1514   Appointment Info   Signing Clinician's Name / Credentials (PT) Amada Noe PT, DPT, ATC, LAT   Rehab Comments (PT) PT eval and treat post op knee surgery Activity order: ice, ambulate, up in chair, up with assist, ROM, WBAT, elevate, position at rest, no immobilizer   Quick Adds   Quick Adds Certification       Present no   Living Environment   People in Home spouse   Current Living Arrangements house   Home Accessibility stairs within home;stairs to enter home   Number of Stairs, Main Entrance 4   Stair Railings, Main Entrance railing on left side (ascending)   Stair Railings, Within Home, Primary railings safe and in good condition   Transportation Anticipated family or friend will provide   Living Environment Comments main level living. flat bed. walk in shower with threshold.   Self-Care   Usual Activity Tolerance good   Current Activity Tolerance moderate   Regular Exercise No   Equipment Currently Used at Home shower chair;raised toilet seat;walker, standard;cane, straight   Fall history within last six months no   General Information   Onset of Illness/Injury or Date of Surgery 10/03/24   Referring Physician Elia Arellano PA-C   Patient/Family Therapy Goals Statement (PT) home with OP PT 10/7   Pertinent History of Current Problem (include personal factors and/or comorbidities that impact the POC) Patient is a 64 year old male, day 0 status post right knee medial unicompartmental arthroplasty by Dr. Reddy, spinal block with adductor block. Patient with a previous medical history of HTN, GERD, DM type 2, low back pain.   Weight-Bearing Status - LUE full weight-bearing   Weight-Bearing Status - RUE weight-bearing as tolerated   Weight-Bearing Status - LLE full weight-bearing   Weight-Bearing Status - RLE full weight-bearing   General Observations on room air, has not voided with IV fluids running   Cognition   Affect/Mental Status (Cognition) WNL   Orientation Status  (Cognition) oriented x 4   Follows Commands (Cognition) WFL   Pain Assessment   Patient Currently in Pain Yes, see Vital Sign flowsheet  (7/10 with exercises, 3/10 with ambulation)   Integumentary/Edema   Integumentary/Edema Comments post op dressings CDI, hemovac in place   Posture    Posture Not impaired   Range of Motion (ROM)   ROM Comment right knee 0-60* in long sitting, short sitting 90* with pain   Strength (Manual Muscle Testing)   Strength (Manual Muscle Testing) Able to perform R SLR;Able to perform L SLR   Bed Mobility   Bed Mobility supine-sit;sit-supine   Supine-Sit [place] (Bed Mobility) independent   Sit-Supine [place] (Bed Mobility) independent   Transfers   Transfers sit-stand transfer   Sit-Stand Transfer   Sit-Stand [place] (Transfers) modified independence   Assistive Device (Sit-Stand Transfers) walker, front-wheeled   Gait/Stairs (Locomotion)   [place] Level (Gait) supervision;verbal cues   Assistive Device (Gait) walker, front-wheeled   Distance in Feet (Gait) 10   Pattern (Gait) step-to;3-point   Deviations/Abnormal Patterns (Gait) stride length decreased;gait speed decreased;base of support, narrow;antalgic   Maintains Weight-bearing Status (Gait) able to maintain   Balance   Balance Comments IND short sitting balance.   Sensory Examination   Sensory Perception patient reports no sensory changes   Coordination   Coordination no deficits were identified   Muscle Tone   Muscle Tone no deficits were identified   Clinical Impression   Criteria for Skilled Therapeutic Intervention Yes, treatment indicated   PT Diagnosis (PT) impaired mobility, muscle weakness, joint stiffness   Influenced by the following impairments POD 0 partial knee replacement   Functional limitations due to impairments pain, use of walker   Clinical Presentation (PT Evaluation Complexity) evolving   Clinical Presentation Rationale post op acuity, medical history, clinical judgement   Clinical Decision  Making (Complexity) low complexity   Planned Therapy Interventions (PT) bed mobility training;cryotherapy;gait training;home exercise program;patient/family education;ROM (range of motion);stair training;strengthening;transfer training;progressive activity/exercise;home program guidelines   Risk & Benefits of therapy have been explained evaluation/treatment results reviewed;participants voiced agreement with care plan;participants included;patient;spouse/significant other   Clinical Impression Comments Following today's functional mobility training, education and functional performance patient is able to complete mobility necessary to manage the home environment with assistance available at home. Anticipate the patient will do well with discharge home with progression to outpatient physical therapy in order to progress towards desired level of function in accordance with the surgeon's protocol. No skilled IP OT needs identified and no additional IP PT needs.   PT Total Evaluation Time   PT Eval, Moderate Complexity Minutes (90696) 10   Therapy Certification   Start of care date 10/03/24   Certification date from 10/03/24   Certification date to 10/03/24   Medical Diagnosis s/p partial knee arthroplasty   Physical Therapy Goals   PT Frequency One time eval and treatment only   PT Predicted Duration/Target Date for Goal Attainment 10/03/24   PT Goals Bed Mobility;Transfers;Gait;Stairs   PT: Bed Mobility Independent;Supine to/from sit   PT: Transfers Modified independent;Sit to/from stand;Assistive device;Within precautions   PT: Gait Modified independent;Rolling walker;150 feet;Within precautions   PT: Stairs Supervision/stand-by assist;2 stairs;Rail on left;Within precautions   PT Discharge Planning   PT Plan no additional IP PT needs   PT Discharge Recommendation (DC Rec) home with assist;home with outpatient physical therapy   PT Rationale for DC Rec Patient from home with wife, stairs to enter and main level  living within. Following today's functional mobility training, education and functional performance patient is able to complete mobility necessary to manage the home environment with assistance available at home. Anticipate the patient will do well with discharge home with progression to outpatient physical therapy in order to progress towards desired level of function in accordance with the surgeon's protocol. No skilled IP OT needs identified and no additional IP PT needs.   PT Brief overview of current status IND bed mobility. MOD IND transfers. MOD IND ambulation 200' with walker. SBA 4 stairs with left hand rail. HEP well tolerated   Total Session Time   Total Session Time (sum of timed and untimed services) 10     [facility]  OUTPATIENT PHYSICAL THERAPY EVALUATION  PLAN OF TREATMENT FOR OUTPATIENT REHABILITATION  (COMPLETE FOR INITIAL CLAIMS ONLY)  Patient's Last Name, First Name, M.I.  YOB: 1960  Clarence Tai                        Provider's Name  [facility] Medical Record No.  [id number]                             Onset Date:  10/03/24   Start of Care Date:  10/03/24   Type:     _X_PT   ___OT   ___SLP Medical Diagnosis:  s/p partial knee arthroplasty              PT Diagnosis:  impaired mobility, muscle weakness, joint stiffness Visits from SOC:  1     See note for plan of treatment, functional goals and certification details    I CERTIFY THE NEED FOR THESE SERVICES FURNISHED UNDER        THIS PLAN OF TREATMENT AND WHILE UNDER MY CARE     (Physician co-signature of this document indicates review and certification of the therapy plan).              Thank you for your referral.  Amada Noe, PT, DPT, ATC, LAT    [facility]  O: [phone]  E: [email or web address]

## 2024-10-03 NOTE — ANESTHESIA PREPROCEDURE EVALUATION
Anesthesia Pre-Procedure Evaluation    Patient: Clarence Tai   MRN: [id number] : 1960        Procedure : Procedure(s):  ARTHROSCOPY, KNEE  right knee medial compartment unicompartmental arthroplasty          Past Medical History:   Diagnosis Date     Nephrolithiasis      Plantar fascial fibromatosis       Past Surgical History:   Procedure Laterality Date     CARPAL TUNNEL RELEASE RT/LT Left 2020     HC LAP,INGUINAL HERNIA REPR,INITIAL      x2, each side     SURGICAL HISTORY OF -       Right elbow screw due to fracture     ZZC APPENDECTOMY  1972      Allergies   Allergen Reactions     Nkda [No Known Drug Allergy]       Social History     Tobacco Use     Smoking status: Never     Passive exposure: Yes     Smokeless tobacco: Never     Tobacco comments:     passive in home   Substance Use Topics     Alcohol use: Yes     Alcohol/week: 0.0 standard drinks of alcohol     Comment: 1 per week      Wt Readings from Last 1 Encounters:   24 92.3 kg (203 lb 8 oz)        Anesthesia Evaluation   Pt has had prior anesthetic. Type: General and MAC.    No history of anesthetic complications       ROS/MED HX  ENT/Pulmonary:  - neg pulmonary ROS   (+)     ROMARIO risk factors, snores loudly, hypertension,   observed stopped breathing,                           (-) tobacco use   Neurologic:  - neg neurologic ROS     Cardiovascular:     (+)  hypertension- -   -  - -                                 Previous cardiac testing   Echo: Date: Results:    Stress Test:  Date: Results:    ECG Reviewed:  Date: 24 Results:  Sinus Rhythm   -Prominent R(V1) and left axis -nonspecific -Seen with pulmonary disease -possible anterior fascicular block.  Cath:  Date: Results:      METS/Exercise Tolerance:     Hematologic:  - neg hematologic  ROS     Musculoskeletal: Comment: CLBP and right knee pain      GI/Hepatic:     (+) GERD, Asymptomatic on medication,                  Renal/Genitourinary:     (+)       Nephrolithiasis ,      "  Endo:     (+)  type II DM, Last HgA1c: 7.5, date: 7-12-24, Not using insulin, - not using insulin pump. Normal glucose range: blood sugar was 139 this AM,               Psychiatric/Substance Use:  - neg psychiatric ROS     Infectious Disease:  - neg infectious disease ROS     Malignancy:  - neg malignancy ROS     Other:  - neg other ROS          Physical Exam    Airway        Mallampati: II   TM distance: > 3 FB   Neck ROM: full   Mouth opening: > 3 cm    Respiratory Devices and Support         Dental     Comment: Missing one upper left tooth    (+) Multiple crowns, permanant bridges      Cardiovascular   cardiovascular exam normal       Rhythm and rate: regular and normal     Pulmonary   pulmonary exam normal        breath sounds clear to auscultation         OUTSIDE LABS:  CBC:   Lab Results   Component Value Date    WBC 6.1 09/23/2024    WBC 10.1 07/15/2022    HGB 15.9 09/23/2024    HGB 16.9 07/15/2022    HCT 46.8 09/23/2024    HCT 48.3 07/15/2022     09/23/2024     07/15/2022     BMP:   Lab Results   Component Value Date     09/23/2024     01/12/2024    POTASSIUM 4.4 09/23/2024    POTASSIUM 4.3 01/12/2024    CHLORIDE 106 09/23/2024    CHLORIDE 104 01/12/2024    CO2 21 (L) 09/23/2024    CO2 25 01/12/2024    BUN 12.8 09/23/2024    BUN 13.9 01/12/2024    CR 0.96 09/23/2024    CR 1.03 01/12/2024     (H) 09/23/2024     (H) 01/12/2024     COAGS: No results found for: \"PTT\", \"INR\", \"FIBR\"  POC: No results found for: \"BGM\", \"HCG\", \"HCGS\"  HEPATIC:   Lab Results   Component Value Date    ALBUMIN 4.0 10/29/2018    PROTTOTAL 7.3 10/29/2018    ALT 43 10/29/2018    AST 24 10/29/2018    ALKPHOS 79 10/29/2018    BILITOTAL 0.5 10/29/2018     OTHER:   Lab Results   Component Value Date    A1C 7.3 (H) 09/23/2024    MILY 9.7 09/23/2024    TSH 0.48 07/15/2022       Anesthesia Plan    ASA Status:  2    NPO Status:  NPO Appropriate    Anesthesia Type: Spinal.   Induction: Intravenous, " "Propofol.   Maintenance: TIVA.        Consents    Anesthesia Plan(s) and associated risks, benefits, and realistic alternatives discussed. Questions answered and patient/representative(s) expressed understanding.     - Discussed:     - Discussed with:  Patient      - Extended Intubation/Ventilatory Support Discussed: No.      - Patient is DNR/DNI Status: No     Use of blood products discussed: Yes.     - Discussed with: Patient.     - Consented: consented to blood products     Postoperative Care    Pain management: IV analgesics, Oral pain medications, Peripheral nerve block (Single Shot).     - Plan for long acting post-op opioid use   PONV prophylaxis: Ondansetron (or other 5HT-3), Meclizine or Dimenhydrinate, Background Propofol Infusion     Comments:    Other Comments: The risks and benefits of anesthesia, and the alternatives where applicable, have been discussed with the patient, and they wish to proceed.     Will place a post-operative right adductor canal block           HUY Aviles CRNA    I have reviewed the pertinent notes and labs in the chart from the past 30 days and (re)examined the patient.  Any updates or changes from those notes are reflected in this note.              # DMII: A1C = 7.3 % (Ref range: 0.0 - 5.6 %) within past 6 months  # Obesity: Estimated body mass index is 30.05 kg/m  as calculated from the following:    Height as of 9/23/24: 1.753 m (5' 9\").    Weight as of 9/30/24: 92.3 kg (203 lb 8 oz).      "

## 2024-10-03 NOTE — PROGRESS NOTES
Ortho Note:    1.  Patient passed physical therapy  2.  I pulled the drain out and applied a new dressing.  3.  Blood pressure looks good and can start lisinopril tomorrow  4.  Patient to urinate prior to discharge.  5.  Answered all patient and wife's questions on medications dressings and follow-up.  6.  Medications reconciled and discharge order signed.  Patient can discharge as soon as he urinates.  Discussed with RN and charge RN.  Message update sent to Dr. Reddy.    Elia Arellano PA-C

## 2024-10-03 NOTE — PROGRESS NOTES
S-(situation): Patient registered to Observation. Patient arrived to room 1300 via bed from PACU    B-(background): 63 y.o male s/p R medial unicompartmental knee arthroplasty by Dr. Reddy     A-(assessment): AxO x 4, on RA. 3/10 pain on R knee, declined prn pain meds. VSS. Able to ambulate for 80 feet with walker and GB.     R-(recommendations): Orders and observation goals reviewed with patient and spouse    Nursing Observation criteria listed below was met:    Skin issues/needs documented:R knee incision, otherwise skin intact.  Isolation needs addressed and Signage up: No  Fall Prevention: Education given and documented: Yes  Education Assessment documented:Yes  Admission Education Documented: Yes  New medication patient education completed and documented (Possible Side Effects of Common Medications handout): Yes  OBS video/handout Reviewed & Documented: Yes  Allergies Reviewed: Yes  Medication Reconciliation Complete: Yes  Home medications if not able to send immediately home with family stored here: NA  Reminder note placed in discharge instructions of home meds: NA  Patient has discharge needs (If yes, please explain): No  Patient discharge preferences addressed and charted on white board:  Yes  Provider notified that patient has arrived to the unit: No      Patient vital signs are at baseline: Yes  Patient able to ambulate as they were prior to admission or with assist devices provided by therapies during their stay:  Yes  Patient MUST void prior to discharge:  No,  Reason:  PACU bladder scan 403ml  Patient able to tolerate oral intake:  Yes  Pain has adequate pain control using Oral analgesics:  Yes  Does patient have an identified :  Yes self and wife  Has goal D/C date and time been discussed with patient:  Yes

## 2024-10-03 NOTE — ANESTHESIA PROCEDURE NOTES
"Intrathecal Procedure Note    Pre-Procedure   Staff -        CRNA: Margarito Alexander APRN CRNA       Other Anesthesia Staff: Hernán Yoder       Performed By: SRNA and with CRNAs       Location: OR       Pre-Anesthestic Checklist: patient identified, IV checked, risks and benefits discussed, informed consent, monitors and equipment checked and pre-op evaluation  Timeout:       Correct Patient: Yes        Correct Procedure: Yes        Correct Site: Yes        Correct Position: Yes   Procedure Documentation  Procedure: intrathecal       Patient Position: sitting       Patient Prep/Sterile Barriers: sterile gloves, mask, patient draped       Skin prep: Betadine       Insertion Site: L3-4. (midline approach).       Needle Gauge: 25.        Needle Length (Inches): 3.5        Spinal Needle Type: Pencan       Introducer used       Introducer: 20 G       # of attempts: 1 and  # of redirects:  0    Assessment/Narrative         Paresthesias: No.       CSF fluid: clear.       Opening pressure was cmH2O while  Sitting.       Comments:  Placed by the SRNA under the supervision of the CRNA.  No complications.  Good sympathectomy.  Good swirl before and after injection.  Will monitor for surgical efficacy.      FOR [facility] ([facility]) ONLY:   Pain Team Contact information: please page the Pain Team Via [employer]. Search \"Pain\". During daytime hours, please page the attending first. At night please page the resident first.      "

## 2024-10-03 NOTE — PROVIDER NOTIFICATION
S-(situation): Patient discharged to home via wheelchair with spouse    B-(background): Right knee arthroplasty    A-(assessment): Pt is alert and oriented. Ambulating independently with the walker. Pain is controlled with oxycodone. Dressing is clean and intact.    R-(recommendations): Discharge instructions reviewed with patient and spouse. Listed belongings gathered and returned to patient.          Discharge Nursing Criteria:   Patient Education given and documented: (STROKE, CHF, Diabetes):  { N/A    Care Plan and Patient education resolved: No    New Medications- pt has been educated about purpose and side effects: Yes    Vaccines  Influenza status verified at discharge:  Not Applicable    IMISC  Prescriptions if needed, hard copies sent with patient  Yes  Home medications returned to patient: Yes  Medication Bin checked and emptied on discharge Yes  Patient reports post-discharge pain management plan is effective: Yes

## 2024-10-03 NOTE — ANESTHESIA POSTPROCEDURE EVALUATION
Patient: Clarence Tai    Procedure: Procedure(s):  ARTHROSCOPY, KNEE  right knee medial compartment unicompartmental arthroplasty       Anesthesia Type:  Spinal    Note:  Disposition: Outpatient   Postop Pain Control: Uneventful            Sign Out: Well controlled pain   PONV: No   Neuro/Psych: Uneventful            Sign Out: Acceptable/Baseline neuro status   Airway/Respiratory: Uneventful            Sign Out: Acceptable/Baseline resp. status   CV/Hemodynamics: Uneventful            Sign Out: Acceptable CV status   Other NRE: NONE   DID A NON-ROUTINE EVENT OCCUR? No    Event details/Postop Comments:  Pt was happy with anesthesia care.  No complications.  He was able to ambulate in the fountain and is participating in his physical therapy activities.  I will follow up with the pt if needed.           Last vitals:  Vitals Value Taken Time   /75 10/03/24 1230   Temp 97.7  F (36.5  C) 10/03/24 1230   Pulse 64 10/03/24 1230   Resp 10 10/03/24 1230   SpO2 96 % 10/03/24 1232   Vitals shown include unfiled device data.    Electronically Signed By: HUY Aviles CRNA  October 3, 2024  4:14 PM

## 2024-10-04 ENCOUNTER — TELEPHONE (OUTPATIENT)
Dept: ORTHOPEDICS | Facility: CLINIC | Age: 64
End: 2024-10-04
Payer: COMMERCIAL

## 2024-10-04 NOTE — TELEPHONE ENCOUNTER
[employer] forms filled out. Awaiting signed CECY (patient says Alicia has a copy. Will leave for Alicia to complete Monday) and provider to sign.    Placed in provider mailbox.    Lia Randle RN on 10/4/2024 at 4:01 PM

## 2024-10-07 ENCOUNTER — THERAPY VISIT (OUTPATIENT)
Dept: PHYSICAL THERAPY | Facility: CLINIC | Age: 64
End: 2024-10-07
Payer: COMMERCIAL

## 2024-10-07 DIAGNOSIS — M25.561 RIGHT KNEE PAIN: Primary | ICD-10-CM

## 2024-10-07 DIAGNOSIS — Z96.651 STATUS POST UNILATERAL KNEE REPLACEMENT, RIGHT: ICD-10-CM

## 2024-10-07 PROCEDURE — 97110 THERAPEUTIC EXERCISES: CPT | Mod: GP | Performed by: PHYSICAL THERAPIST

## 2024-10-07 PROCEDURE — 97161 PT EVAL LOW COMPLEX 20 MIN: CPT | Mod: GP | Performed by: PHYSICAL THERAPIST

## 2024-10-07 PROCEDURE — 97116 GAIT TRAINING THERAPY: CPT | Mod: GP | Performed by: PHYSICAL THERAPIST

## 2024-10-07 ASSESSMENT — ACTIVITIES OF DAILY LIVING (ADL)
STAND: ACTIVITY IS FAIRLY DIFFICULT
HOW_WOULD_YOU_RATE_THE_CURRENT_FUNCTION_OF_YOUR_KNEE_DURING_YOUR_USUAL_DAILY_ACTIVITIES_ON_A_SCALE_FROM_0_TO_100_WITH_100_BEING_YOUR_LEVEL_OF_KNEE_FUNCTION_PRIOR_TO_YOUR_INJURY_AND_0_BEING_THE_INABILITY_TO_PERFORM_ANY_OF_YOUR_USUAL_DAILY_ACTIVITIES?: 10
GO DOWN STAIRS: ACTIVITY IS FAIRLY DIFFICULT
SQUAT: I AM UNABLE TO DO THE ACTIVITY
SIT WITH YOUR KNEE BENT: ACTIVITY IS FAIRLY DIFFICULT
GIVING WAY, BUCKLING OR SHIFTING OF KNEE: THE SYMPTOM PREVENTS ME FROM ALL DAILY ACTIVITIES
STIFFNESS: THE SYMPTOM PREVENTS ME FROM ALL DAILY ACTIVITIES
HOW_WOULD_YOU_RATE_THE_OVERALL_FUNCTION_OF_YOUR_KNEE_DURING_YOUR_USUAL_DAILY_ACTIVITIES?: ABNORMAL
AS_A_RESULT_OF_YOUR_KNEE_INJURY,_HOW_WOULD_YOU_RATE_YOUR_CURRENT_LEVEL_OF_DAILY_ACTIVITY?: SEVERELY ABNORMAL
PAIN: THE SYMPTOM PREVENTS ME FROM ALL DAILY ACTIVITIES
SWELLING: THE SYMPTOM PREVENTS ME FROM ALL DAILY ACTIVITIES
LIMPING: THE SYMPTOM PREVENTS ME FROM ALL DAILY ACTIVITIES
PLEASE_INDICATE_YOR_PRIMARY_REASON_FOR_REFERRAL_TO_THERAPY:: KNEE
WALK: ACTIVITY IS FAIRLY DIFFICULT
SWELLING: THE SYMPTOM PREVENTS ME FROM ALL DAILY ACTIVITIES
STIFFNESS: THE SYMPTOM PREVENTS ME FROM ALL DAILY ACTIVITIES
RAW_SCORE: 12
GIVING WAY, BUCKLING OR SHIFTING OF KNEE: THE SYMPTOM PREVENTS ME FROM ALL DAILY ACTIVITIES
AS_A_RESULT_OF_YOUR_KNEE_INJURY,_HOW_WOULD_YOU_RATE_YOUR_CURRENT_LEVEL_OF_DAILY_ACTIVITY?: SEVERELY ABNORMAL
GO DOWN STAIRS: ACTIVITY IS FAIRLY DIFFICULT
GO UP STAIRS: ACTIVITY IS FAIRLY DIFFICULT
STAND: ACTIVITY IS FAIRLY DIFFICULT
SIT WITH YOUR KNEE BENT: ACTIVITY IS FAIRLY DIFFICULT
HOW_WOULD_YOU_RATE_THE_OVERALL_FUNCTION_OF_YOUR_KNEE_DURING_YOUR_USUAL_DAILY_ACTIVITIES?: ABNORMAL
LIMPING: THE SYMPTOM PREVENTS ME FROM ALL DAILY ACTIVITIES
KNEE_ACTIVITY_OF_DAILY_LIVING_SUM: 12
WEAKNESS: THE SYMPTOM PREVENTS ME FROM ALL DAILY ACTIVITIES
HOW_WOULD_YOU_RATE_THE_CURRENT_FUNCTION_OF_YOUR_KNEE_DURING_YOUR_USUAL_DAILY_ACTIVITIES_ON_A_SCALE_FROM_0_TO_100_WITH_100_BEING_YOUR_LEVEL_OF_KNEE_FUNCTION_PRIOR_TO_YOUR_INJURY_AND_0_BEING_THE_INABILITY_TO_PERFORM_ANY_OF_YOUR_USUAL_DAILY_ACTIVITIES?: 10
RISE FROM A CHAIR: ACTIVITY IS FAIRLY DIFFICULT
KNEE_ACTIVITY_OF_DAILY_LIVING_SCORE: 17.14
SQUAT: I AM UNABLE TO DO THE ACTIVITY
KNEEL ON THE FRONT OF YOUR KNEE: I AM UNABLE TO DO THE ACTIVITY
RISE FROM A CHAIR: ACTIVITY IS FAIRLY DIFFICULT
KNEEL ON THE FRONT OF YOUR KNEE: I AM UNABLE TO DO THE ACTIVITY
GO UP STAIRS: ACTIVITY IS FAIRLY DIFFICULT
WALK: ACTIVITY IS FAIRLY DIFFICULT
WEAKNESS: THE SYMPTOM PREVENTS ME FROM ALL DAILY ACTIVITIES
PAIN: THE SYMPTOM PREVENTS ME FROM ALL DAILY ACTIVITIES

## 2024-10-07 NOTE — PROGRESS NOTES
PHYSICAL THERAPY EVALUATION  Type of Visit: Evaluation       Fall Risk Screen:  Fall screen completed by: PT  Have you fallen 2 or more times in the past year?: No  Have you fallen and had an injury in the past year?: No  Is patient a fall risk?: No    Subjective       Presenting condition or subjective complaint: partial knee replacement on thursday (10/03/24).Taking oxycodone prn, Tylenol in between. Doing stairs at home 1 at a time, going pretty well. Not sleeping well at home. Walking tolerance ~ 5-10 minutes with walker.   Date of onset: 10/03/24    Relevant medical history:     Dates & types of surgery: tonsils,youth  hernia,1989  elbow screw, 1992  partual kbee replacement,10/03/2024    Prior diagnostic imaging/testing results: MRI; X-ray; EMG; Bone scan     Prior therapy history for the same diagnosis, illness or injury: No        Living Environment  Social support: With a significant other or spouse   Type of home: House; Multi-level   Stairs to enter the home: Yes       Ramp: No   Stairs inside the home: Yes 8 Is there a railing: Yes     Help at home: Self Cares (home health aide/personal care attendant, family, etc); Home management tasks (cooking, cleaning); Assist for driving and community activities  Equipment owned: Walker with wheels     Employment: Yes   Hobbies/Interests: hunting,fishing,[employer] events    Patient goals for therapy: everything    Pain assessment:  6/10 now     Objective   KNEE EVALUATION  INTEGUMENTARY (edema, incisions):  significant varicose veins on R LE (reports long hx of these), gross swelling throughout lower leg/knee/thigh but no signs of DVT (-Anselmo's)    GAIT:  Weightbearing Status: WBAT  Assistive Device(s): Walker (front wheeled)  Gait Deviations: Antalgic  Decreased heel strike and push off on R  ROM:  PROM R knee 5- 95 in supine, sitting 5-102  STRENGTH:  poor to fair quad set, able to do SLR with slight extensor lag on lowering  phase  FLEXIBILITY:  tightness R>L gastroc/hamstrings  JOINT MOBILITY:  good patellar mobility R     Assessment & Plan   CLINICAL IMPRESSIONS  Medical Diagnosis: s/p R unilateral knee arthro    Treatment Diagnosis: s/p R unilateral knee arthro   Impression/Assessment: Patient is a 64 year old male with s/p R unilateral knee arthroplasty complaints.  The following significant findings have been identified: Pain, Decreased ROM/flexibility, Decreased joint mobility, Decreased strength, Impaired balance, Decreased proprioception, Inflammation, Edema, Impaired gait, Impaired muscle performance, and Decreased activity tolerance. These impairments interfere with their ability to perform self care tasks, work tasks, recreational activities, household chores, driving , household mobility, and community mobility as compared to previous level of function.     Clinical Decision Making (Complexity):  Clinical Presentation: Stable/Uncomplicated  Clinical Presentation Rationale: based on medical and personal factors listed in PT evaluation  Clinical Decision Making (Complexity): Low complexity    PLAN OF CARE  Treatment Interventions:  Modalities: Cryotherapy  Interventions: Gait Training, Manual Therapy, Neuromuscular Re-education, Therapeutic Activity, Therapeutic Exercise, Self-Care/Home Management    Long Term Goals     PT Goal 1  Rationale: to maximize safety and independence with performance of ADLs and functional tasks;to maximize safety and independence within the home;to maximize safety and independence within the community;to maximize safety and independence with self cares;to maximize safety and independence with transportation  Target Date: 11/20/24      Frequency of Treatment: 1x week  Duration of Treatment: 6 weeks    Recommended Referrals to Other Professionals: Physical Therapy  Education Assessment:   Learner/Method: Patient;Pictures/Video    Risks and benefits of evaluation/treatment have been explained.    Patient/Family/caregiver agrees with Plan of Care.     Evaluation Time:     PT Eval, Low Complexity Minutes (45189): 15       Signing Clinician: Stephanie Shelton, PT,OCS

## 2024-10-09 PROBLEM — Z96.651: Status: ACTIVE | Noted: 2024-10-09

## 2024-10-15 ENCOUNTER — THERAPY VISIT (OUTPATIENT)
Dept: PHYSICAL THERAPY | Facility: CLINIC | Age: 64
End: 2024-10-15
Payer: COMMERCIAL

## 2024-10-15 DIAGNOSIS — Z96.651 STATUS POST UNILATERAL KNEE REPLACEMENT, RIGHT: ICD-10-CM

## 2024-10-15 DIAGNOSIS — M25.561 RIGHT KNEE PAIN: Primary | ICD-10-CM

## 2024-10-15 PROCEDURE — 97110 THERAPEUTIC EXERCISES: CPT | Mod: GP | Performed by: PHYSICAL THERAPIST

## 2024-10-15 PROCEDURE — 97116 GAIT TRAINING THERAPY: CPT | Mod: GP | Performed by: PHYSICAL THERAPIST

## 2024-10-15 NOTE — PROGRESS NOTES
PT PROGRESS NOTE    ASSESSMENT  Patient presents to PT 2 weeks s/p R penelope arthroplasty of the knee. Overall he is regaining motion and strength in R LE. He demonstrates stable gait pattern with limited need of SPC. Able to ascend steps with reciprocal pattern (limited in descent by R knee end range of motion). Today his R knee AROM measures at 3-102 degrees.   PLAN  Return for follow up with surgical team  Recommending patient continue with PT for an additional 2-4 weeks to promote full return to functional mobility and strength.  GOALS  Progressing as expected       10/15/24 0500   Appointment Info   Signing clinician's name / credentials Rahel Fields PT, DPT   Total/Authorized Visits EPIC 06/17/24   Visits Used 2   Medical Diagnosis s/p R unilateral knee arthro   PT Tx Diagnosis s/p R unilateral knee arthro   Progress Note/Certification   Onset of illness/injury or Date of Surgery 10/03/24   Therapy Frequency 1x week   Predicted Duration 8 weeks   Progress Note Due Date 12/07/24   GOALS   PT Goals 2   PT Goal 1   Goal Identifier Able to ambulate   Goal Description 2-3 miles   Rationale to maximize safety and independence with performance of ADLs and functional tasks;to maximize safety and independence within the home;to maximize safety and independence within the community;to maximize safety and independence with self cares;to maximize safety and independence with transportation   Goal Progress progressing   Target Date 11/20/24   PT Goal 2   Goal Identifier ambulate stairs reciprocally   Rationale to maximize safety and independence with performance of ADLs and functional tasks;to maximize safety and independence within the home;to maximize safety and independence within the community;to maximize safety and independence with self cares   Goal Progress progressing, able to toelrate with recirocal stair climbing but 2/2 limited knee flexion motion when descending with reciprocal pattern   Target Date 12/04/24    Subjective Report   Subjective Report States overall things are going well. He has been completing his exercises consitently. Notes that he has backed off of the frequency of icing. Ambulates into clinic with SPC but places very little weight through the cane when ambulating. Notes he is not using SPC in the hosue.   Objective Measures   Objective Measures Objective Measure 2   Objective Measure 1   Objective Measure R knee AROM   Details in sujpine 3-102 degrees   Objective Measure 2   Objective Measure Joint Line Edema Circumference   Details R: 35 cm; L: 30 cm   Therapeutic Procedure/Exercise   Therapeutic Procedures: strength, endurance, ROM, flexibility minutes (94244) 27   PTRx Ther Proc 1 Supine Heel Slides   PTRx Ther Proc 1 - Details progressed to seated or complete hooklying with increased repetitions   PTRx Ther Proc 2 Ankle Pumps in Long Sitting   PTRx Ther Proc 2 - Details x20 every 2 hours   PTRx Ther Proc 3 Isometric Quad   PTRx Ther Proc 3 - Details x20 reps with initial tactile cues good quad activation   PTRx Ther Proc 4 Active Assisted Knee Flexion   PTRx Ther Proc 4 - Details 2x15    PTRx Ther Proc 5 Hip Flexion Straight Leg Raise   PTRx Ther Proc 5 - Details x20 focus on activation of quad with slow 3-5 second lower   Skilled Intervention to strength LE and improve ROM   Patient Response/Progress toelrated well overall, demosntrates good understanding, appropriate muscular fatigue throughout session   PTRx Ther Proc 6 Short Arc Knee Extension   PTRx Ther Proc 6 - Details to fatigue at 15 today   PTRx Ther Proc 7 Knee Bends   PTRx Ther Proc 7 - Details 25% squat tolerated; no UE support required   PTRx Ther Proc 8 Bridging #1   PTRx Ther Proc 8 - Details 2x10   Ther Proc 1 Nustep   Ther Proc 1 - Details x5 minutes x4-5 resistance no UE, inching fwd to increase knee flexion   Gait Training   Gait Training Minutes, includes stair climbing (32568) 12   Gait 1 SPC and No AD   Skilled Intervention  to normalize gait mechanics   Patient Response/Progress excellent understanding and execution   Gait 2 Stairs- ascending and descending   Gait 2 - Details able to tolerate ascending stairs with reciprocal pattern using single hand on railing; can complete descending with reciprocal pattern but increases pain and demonstrates compensation with his hip due to lack of R knee flexion upon eccentric lowering   Gait Training Gait 2   Gait 1 - Details good gait pattern, improved heel strike, did catch R toe 1x on even surface   Education   Learner/Method Patient;Demonstration;Pictures/Video;No Barriers to Learning   Education Comments progression of ambulation, stair climbing, HEP   Plan   Home program PTRX HEP- printed versions   Plan for next session increased strengthening and functional tasks   Total Session Time   Timed Code Treatment Minutes 39   Total Treatment Time (sum of timed and untimed services) 39

## 2024-10-21 ENCOUNTER — OFFICE VISIT (OUTPATIENT)
Dept: ORTHOPEDICS | Facility: CLINIC | Age: 64
End: 2024-10-21
Payer: COMMERCIAL

## 2024-10-21 VITALS
HEIGHT: 69 IN | BODY MASS INDEX: 28.88 KG/M2 | DIASTOLIC BLOOD PRESSURE: 70 MMHG | SYSTOLIC BLOOD PRESSURE: 98 MMHG | TEMPERATURE: 97.5 F | WEIGHT: 195 LBS

## 2024-10-21 DIAGNOSIS — Z96.652 STATUS POST LEFT UNICOMPARTMENTAL KNEE REPLACEMENT: Primary | ICD-10-CM

## 2024-10-21 PROCEDURE — 99024 POSTOP FOLLOW-UP VISIT: CPT | Performed by: PHYSICIAN ASSISTANT

## 2024-10-21 ASSESSMENT — PAIN SCALES - GENERAL: PAINLEVEL: MILD PAIN (3)

## 2024-10-21 NOTE — PATIENT INSTRUCTIONS
Encounter Diagnosis   Name Primary?    Status post left unicompartmental knee replacement Yes     Rest, ice and elevate above heart level as needed for pain control  1. Incision: Looks excellent now and we got the extra sutures out..  No bandages necessary now.  Start gently washing the incision and incisional area with mix of half Betadine and half soapy water once a day until healed so about another week. Then you can start massaging the incision with vitamin E lotion which helps break down scar tissue and desensitize it.    2. Anticoagulation: Continue your 81 mg aspirin twice a day until full 6 weeks out from surgery    4. JULIAN stockings: Wear these when up for a full 6 weeks; they help prevent clots.    5. Medication: There is great that you are off oxycodone now.  Continue taking Tylenol as needed    6. Therapy: Continue your formal physical therapy to focus on getting your range of motion and strength back.  You are doing very well but I think it is good to continue working out at and you want to get as much knee flexion as you can get.  Continue doing your therapy.    7. Follow-up: We will follow-up in 4 weeks and at that time we will get x-rays.  We will have you follow-up with Dr. Reddy on 11/8/2024 which will be close to 6 weeks out from surgery.      [employer] and [employer] may offer reliable information regarding your diagnosis and treatment plan.    THANK YOU for coming in today. If you receive a survey via [employer] or mail please let us know if there was anything you especially appreciated today or if there is any way we can improve our clinic. We appreciate your input.    GENERAL INFORMATION:  My hours are:  Monday: Clinic 720am-440pm  Tuesday: Clinic 8am-440pm  Wednesday: Surgery  Thursday: Admin  Friday: Surgery      [facility] for any issues or concerns: [phone]    I am not in the office Thursdays. Therefore non- urgent calls and medical messages received on Thursday  will be addressed when we are back in the office on Wednesday. Urgent matters will be reviewed and addressed by one of our partners in the office as needed.    If lab work was done today as part of your evaluation you will generally be contacted via [employer], mail, or phone with the results within 1-5 days. If there is an alarming result we will contact you by phone. Lab results come back at varying times, I generally wait until all labs are resulted before making comments on results. Please note labs are automatically released to [employer] (if you have signed up for it) once available-at times you may see these prior to my having a chance to review them as well.    If you need refills please contact your pharmacist. They will send a refill request to me to review. Please allow 3 business days for us to process all refill requests. All narcotic refills should be handled in the clinic at the time of your visit.

## 2024-10-21 NOTE — PROGRESS NOTES
"Orthopedic Clinic Post-Operative Note    CHIEF COMPLAINT:   Chief Complaint   Patient presents with    Surgical Followup     Arthroscopy Right knee followed by medial compartment unicompartmental arthroplasty       HISTORY OF PRESENT ILLNESS  Location: Left knee  Rating of Pain: 3 out of 10  Pain is better with: Rest  Pain improving overall: Yes  Associated Features: Denies numbness or tingling shooting burning or electric pain or any problems with the incision such as drainage fevers or chills.  Patient concerns: None.  Patient states he thinks he might not need physical therapy anymore.  I did encourage him to continue to go.  Additional History: Patient underwent unicompartmental left knee replacement by Dr. Nicolas on 10/3/2024.  Patient was a same-day discharge and went home later that day.  Patient was on aspirin 81 mg twice daily x 6 weeks and oxycodone 5 to 10 mg and also was given Lyrica 75 mg twice daily and Celebrex 200 mg twice daily.  He is only taking Tylenol now and has not taken oxycodone for about 4 days.    Patient's past medical, surgical, social and family histories reviewed.     REVIEW OF SYSTEMS  Review of systems negative other than positive findings in HPI.    Physical Exam:  Vitals: BP 98/70 (BP Location: Right arm, Cuff Size: Adult Regular)   Temp 97.5  F (36.4  C) (Temporal)   Ht 1.753 m (5' 9\")   Wt 88.5 kg (195 lb)   BMI 28.80 kg/m    BMI= Body mass index is 28.8 kg/m .  Constitutional: healthy, alert and no acute distress   Psychiatric: mentation appears normal and affect normal/bright  NEURO: no focal deficits, CMS intact left lower extremity   RESP: Normal with easy respirations and no use of accessory muscles to breathe, no audible wheezing or retractions  CV: Calf soft and nontender to palpation, leg warm   SKIN: Medial from midline incision with slight erythema and slight swelling but no drainage.  Small arthroscopic incision with no erythema or swelling and no drainage.  " Incisions do not open with gentle pressure.  The rest of the knee with no erythema, rashes, excoriation, or breakdown. No evidence of infection.   MUSCULOSKELETAL:  INSPECTION of left knee: No gross deformities, erythema, edema, ecchymosis, atrophy or fasciculations.   PALPATION: No tenderness medial, lateral, anterior and posterior portion of the knee. No specific joint line tenderness.  No prepatella bursa tenderness or pes bursa tenderness. No increased warmth.  No effusion.   ROM: Passive: Extension full, flexion to 95 . All range of motion without catching, locking or pain.     STRENGTH: 5 out of 5 quad and hamstring without pain.   SPECIAL TEST: none today.  GAIT: Not observed today.  Lymph: no palpable lymph nodes    Diagnostic Modalities:  No radiographs necessary today.     I did review the x-rays  that were done on 10/3/2024 after surgery and it at first on the lateral look like the component was off of the condyle but I believe that is the other condyle making it look that way.  There are no signs of loosening that I can see.  Dr. Reddy also looked at the x-ray today and confirmed it looks good.      Impression: 1.  19 days status post Left knee medial unicompartmental knee arthroplasty by Dr. Reddy      FOCUSED PLAN:   Patient is doing very well and not taking oxycodone for the last 4 days just Tylenol.  Patient educated in washing the wound in 50% betadine and 50% soapy water daily for the next week/until wound is healed.  Anticoagulation continue aspirin 81 mg twice a day until a full 6 weeks after surgery.  Patient has been doing his formal physical therapy.  I did encourage him he is to continue going to make sure he maximizes his full potential for range of motion of his knee.  After visit summary given with all instructions.  Patient can follow-up with Dr. Reddy on 11/8/2024.    Re-x-ray on return: Weight Bearing AP and Lateral of left knee and bilateral sunrise      This note was dictated with [facility].    Elia Arellano PA-C

## 2024-11-07 ENCOUNTER — TELEPHONE (OUTPATIENT)
Dept: FAMILY MEDICINE | Facility: CLINIC | Age: 64
End: 2024-11-07

## 2024-11-07 NOTE — TELEPHONE ENCOUNTER
Patient Quality Outreach    Patient is due for the following:   Colon Cancer Screening    Next Steps:   No follow up needed at this time.    Type of outreach:    Sent [employer] message.      Questions for provider review:    None           Milana Cortes MA

## 2024-11-08 ENCOUNTER — ANCILLARY PROCEDURE (OUTPATIENT)
Dept: GENERAL RADIOLOGY | Facility: CLINIC | Age: 64
End: 2024-11-08
Attending: ORTHOPAEDIC SURGERY
Payer: COMMERCIAL

## 2024-11-08 ENCOUNTER — OFFICE VISIT (OUTPATIENT)
Dept: ORTHOPEDICS | Facility: CLINIC | Age: 64
End: 2024-11-08
Payer: COMMERCIAL

## 2024-11-08 DIAGNOSIS — Z96.652 STATUS POST LEFT UNICOMPARTMENTAL KNEE REPLACEMENT: ICD-10-CM

## 2024-11-08 DIAGNOSIS — Z96.652 STATUS POST LEFT UNICOMPARTMENTAL KNEE REPLACEMENT: Primary | ICD-10-CM

## 2024-11-08 PROCEDURE — 73564 X-RAY EXAM KNEE 4 OR MORE: CPT | Mod: TC | Performed by: RADIOLOGY

## 2024-11-08 PROCEDURE — 99024 POSTOP FOLLOW-UP VISIT: CPT | Performed by: ORTHOPAEDIC SURGERY

## 2024-11-08 NOTE — PROGRESS NOTES
S:  6 weeks out from medial compartment unicompartmental arthroplasty RLE.  No issues.Some hypersensitivity when in bed such that sheets against knee feel like burning at times.  Now left medial knee more symptomatic.         Patient Active Problem List   Diagnosis    Lumbago    CARDIOVASCULAR SCREENING; LDL GOAL LESS THAN 130    Superficial phlebitis of right leg    Varicose veins of legs    Nephrolithiasis    Benign essential hypertension    Right knee pain    Primary osteoarthritis of both knees    Type 2 diabetes mellitus with hyperglycemia, without long-term current use of insulin (H)    Arthritis of knee    Status post unilateral knee replacement, right            Past Medical History:   Diagnosis Date    Nephrolithiasis     Plantar fascial fibromatosis             Past Surgical History:   Procedure Laterality Date    ARTHROPLASTY KNEE UNICOMPARTMENT Right 10/3/2024    Procedure: right knee medial compartment unicompartmental arthroplasty;  Surgeon: Elia Reddy MD;  Location: [facility] OR    ARTHROSCOPY KNEE Right 10/3/2024    Procedure: ARTHROSCOPY, KNEE RIGHT;  Surgeon: Elia Reddy MD;  Location: [facility] OR    CARPAL TUNNEL RELEASE RT/LT Left 03/05/2020    HC LAP,INGUINAL HERNIA REPR,INITIAL      x2, each side    SURGICAL HISTORY OF -       Right elbow screw due to fracture    ZZC APPENDECTOMY  1972            Social History     Tobacco Use    Smoking status: Never     Passive exposure: Yes    Smokeless tobacco: Never    Tobacco comments:     passive in home   Substance Use Topics    Alcohol use: Yes     Alcohol/week: 0.0 standard drinks of alcohol     Comment: 1 per week            Family History   Adopted: Yes   Problem Relation Age of Onset    Unknown/Adopted Mother     Unknown/Adopted Father                Allergies   Allergen Reactions    Nkda [No Known Drug Allergy]             Current Outpatient Medications   Medication Sig Dispense Refill    acetaminophen (TYLENOL) 325 MG tablet Take 2 tablets (650 mg) by  mouth every 4 hours as needed for other (mild pain). 100 tablet 0    aspirin 81 MG EC tablet Take 1 tablet (81 mg) by mouth 2 times daily. (Patient not taking: Reported on 10/21/2024) 84 tablet 0    atorvastatin (LIPITOR) 20 MG tablet Take 1 tablet (20 mg) by mouth daily 90 tablet 3    lisinopril (ZESTRIL) 10 MG tablet Take 1 tablet (10 mg) by mouth daily. 90 tablet 0    metFORMIN (GLUCOPHAGE XR) 500 MG 24 hr tablet Take 2 tablets (1,000 mg) by mouth 2 times daily (with meals) 360 tablet 2    omeprazole (PRILOSEC) 40 MG DR capsule TAKE 1 CAPSULE BY MOUTH EVERY DAY (Patient not taking: Reported on 10/21/2024) 90 capsule 1    oxyCODONE (ROXICODONE) 5 MG tablet Take 1-2 tablets (5-10 mg) by mouth every 4 hours as needed for moderate to severe pain. 33 tablet 0    senna-docusate (SENOKOT-S/PERICOLACE) 8.6-50 MG tablet Take 1-2 tablets by mouth 2 times daily. Take while on oral narcotics to prevent or treat constipation. (Patient not taking: Reported on 10/21/2024) 30 tablet 1    sildenafil (VIAGRA) 100 MG tablet Take 1 tablet (100 mg) by mouth daily as needed 30 min to 4 hrs before sex. Do not use with nitroglycerin, terazosin or doxazosin. (Patient not taking: Reported on 9/30/2024) 6 tablet 1          Review Of Systems  Skin: negative  Eyes: negative  Ears/Nose/Throat: negative  Respiratory: No shortness of breath, dyspnea on exertion, cough, or hemoptysis    O: Physical exam:  Wound healing well, supple, well epithelialized.  CMS intact to RLE. Adequate knee flexion(130+) and extension (nearly full)).      Lab:Hgb A1C 7.3    Images:XR KNEE PORT RIGHT 1/2 VIEWS   10/3/2024 11:32 AM      HISTORY: Post-Op Total Knee  COMPARISON: None.                                                                       IMPRESSION:      There are immediate postoperative changes from right knee medial  unicompartmental arthroplasty, in standard alignment. No  periprosthetic fracture.         A: stable following unicompartmental medial  arthroplasty knee       P:  Continue to rehab as tolerated  Notify if exacerbation symptoms  See back one year with new images  Will let us know if he wishes to proceed with intervention L knee.             In addition to the above assessment and plan each active problem on Clarence's problem list was evaluated today. This included the questioning of Clarence for any medication problems. We will continue the current treatment plan for these active problems except as noted.

## 2024-11-08 NOTE — LETTER
11/8/2024      Clarence Tai  [address] [address] [address]      Dear Colleague,    Thank you for referring your patient, Clarence Tai, to the [facility]. Please see a copy of my visit note below.    S:  6 weeks out from medial compartment unicompartmental arthroplasty RLE.  No issues.Some hypersensitivity when in bed such that sheets against knee feel like burning at times.  Now left medial knee more symptomatic.         Patient Active Problem List   Diagnosis     Lumbago     CARDIOVASCULAR SCREENING; LDL GOAL LESS THAN 130     Superficial phlebitis of right leg     Varicose veins of legs     Nephrolithiasis     Benign essential hypertension     Right knee pain     Primary osteoarthritis of both knees     Type 2 diabetes mellitus with hyperglycemia, without long-term current use of insulin (H)     Arthritis of knee     Status post unilateral knee replacement, right            Past Medical History:   Diagnosis Date     Nephrolithiasis      Plantar fascial fibromatosis             Past Surgical History:   Procedure Laterality Date     ARTHROPLASTY KNEE UNICOMPARTMENT Right 10/3/2024    Procedure: right knee medial compartment unicompartmental arthroplasty;  Surgeon: Elia Reddy MD;  Location: [facility]     ARTHROSCOPY KNEE Right 10/3/2024    Procedure: ARTHROSCOPY, KNEE RIGHT;  Surgeon: Elia Reddy MD;  Location: [facility]     CARPAL TUNNEL RELEASE RT/LT Left 03/05/2020     HC LAP,INGUINAL HERNIA REPR,INITIAL      x2, each side     SURGICAL HISTORY OF -       Right elbow screw due to fracture     ZZC APPENDECTOMY  1972            Social History     Tobacco Use     Smoking status: Never     Passive exposure: Yes     Smokeless tobacco: Never     Tobacco comments:     passive in home   Substance Use Topics     Alcohol use: Yes     Alcohol/week: 0.0 standard drinks of alcohol     Comment: 1 per week            Family History   Adopted: Yes   Problem Relation Age of Onset     Unknown/Adopted  Mother      Unknown/Adopted Father                Allergies   Allergen Reactions     Nkda [No Known Drug Allergy]             Current Outpatient Medications   Medication Sig Dispense Refill     acetaminophen (TYLENOL) 325 MG tablet Take 2 tablets (650 mg) by mouth every 4 hours as needed for other (mild pain). 100 tablet 0     aspirin 81 MG EC tablet Take 1 tablet (81 mg) by mouth 2 times daily. (Patient not taking: Reported on 10/21/2024) 84 tablet 0     atorvastatin (LIPITOR) 20 MG tablet Take 1 tablet (20 mg) by mouth daily 90 tablet 3     lisinopril (ZESTRIL) 10 MG tablet Take 1 tablet (10 mg) by mouth daily. 90 tablet 0     metFORMIN (GLUCOPHAGE XR) 500 MG 24 hr tablet Take 2 tablets (1,000 mg) by mouth 2 times daily (with meals) 360 tablet 2     omeprazole (PRILOSEC) 40 MG DR capsule TAKE 1 CAPSULE BY MOUTH EVERY DAY (Patient not taking: Reported on 10/21/2024) 90 capsule 1     oxyCODONE (ROXICODONE) 5 MG tablet Take 1-2 tablets (5-10 mg) by mouth every 4 hours as needed for moderate to severe pain. 33 tablet 0     senna-docusate (SENOKOT-S/PERICOLACE) 8.6-50 MG tablet Take 1-2 tablets by mouth 2 times daily. Take while on oral narcotics to prevent or treat constipation. (Patient not taking: Reported on 10/21/2024) 30 tablet 1     sildenafil (VIAGRA) 100 MG tablet Take 1 tablet (100 mg) by mouth daily as needed 30 min to 4 hrs before sex. Do not use with nitroglycerin, terazosin or doxazosin. (Patient not taking: Reported on 9/30/2024) 6 tablet 1          Review Of Systems  Skin: negative  Eyes: negative  Ears/Nose/Throat: negative  Respiratory: No shortness of breath, dyspnea on exertion, cough, or hemoptysis    O: Physical exam:  Wound healing well, supple, well epithelialized.  CMS intact to RLE. Adequate knee flexion(130+) and extension (nearly full)).      Lab:Hgb A1C 7.3    Images:XR KNEE PORT RIGHT 1/2 VIEWS   10/3/2024 11:32 AM      HISTORY: Post-Op Total Knee  COMPARISON: None.                                                                        IMPRESSION:      There are immediate postoperative changes from right knee medial  unicompartmental arthroplasty, in standard alignment. No  periprosthetic fracture.         A: stable following unicompartmental medial arthroplasty knee       P:  Continue to rehab as tolerated  Notify if exacerbation symptoms  See back one year with new images  Will let us know if he wishes to proceed with intervention L knee.             In addition to the above assessment and plan each active problem on Clarence's problem list was evaluated today. This included the questioning of Clarence for any medication problems. We will continue the current treatment plan for these active problems except as noted.        Again, thank you for allowing me to participate in the care of your patient.        Sincerely,        Elia Reddy MD

## 2024-11-13 ENCOUNTER — TELEPHONE (OUTPATIENT)
Dept: ORTHOPEDICS | Facility: CLINIC | Age: 64
End: 2024-11-13
Payer: COMMERCIAL

## 2024-11-13 NOTE — TELEPHONE ENCOUNTER
"Patient's form was filled out, reviewed, and signed by provider.      Form was faxed to the designated fax number: [phone]    A copy was sent to scanning.     A copy was placed in the lower level \"faxed\" file/drawer.      Ciarra Chaney RN   [facility]    "

## 2024-11-13 NOTE — TELEPHONE ENCOUNTER
Reason for Call:  Form, our goal is to have forms completed with 72 hours, however, some forms may require a visit or additional information.    Type of letter, form or note:  [facility]    Who is the form from?: [facility]     Where did the form come from: form was faxed in    What clinic location was the form placed at?: [facility]    Where the form was placed:  Dr. Reddy's Box/Folder    What number is listed as a contact on the form?: [phone]    Ciarra Chaney RN   [facility]

## 2024-11-19 ENCOUNTER — TELEPHONE (OUTPATIENT)
Dept: ORTHOPEDICS | Facility: CLINIC | Age: 64
End: 2024-11-19
Payer: COMMERCIAL

## 2024-11-19 NOTE — TELEPHONE ENCOUNTER
Forms/Letter Request    Type of form/letter: Attending Physician's Statment    Do we have the form/letter: Yes:     Who is the form from? Insurance comp    Where did/will the form come from? form was faxed in    When is form/letter needed by: ASAP    How would you like the form/letter returned: Fax : [phone]    Patient Notified form requests are processed in 5-7 business days:No    Could we [facility] this information to you [facility] or would you prefer to receive a phone call?:   Fax when done

## 2024-11-19 NOTE — TELEPHONE ENCOUNTER
Duplicate form. Please see telephone encounter from 11/13/24 for additional information.     Ciarra Chaney RN   [facility]

## 2024-12-01 DIAGNOSIS — R05.9 COUGH: ICD-10-CM

## 2024-12-02 DIAGNOSIS — E11.65 TYPE 2 DIABETES MELLITUS WITH HYPERGLYCEMIA, WITHOUT LONG-TERM CURRENT USE OF INSULIN (H): Primary | ICD-10-CM

## 2024-12-02 RX ORDER — OMEPRAZOLE 40 MG/1
40 CAPSULE, DELAYED RELEASE ORAL DAILY
Qty: 90 CAPSULE | Refills: 1 | OUTPATIENT
Start: 2024-12-02

## 2024-12-02 NOTE — TELEPHONE ENCOUNTER
Request refill of test strips. Patient usually tests blood glucose once per day but is not consistent. Has been using his wife's test strips and now they are completely out.    Need provider approval as the prescription for test strips is .

## 2024-12-26 DIAGNOSIS — I10 BENIGN ESSENTIAL HYPERTENSION: ICD-10-CM

## 2024-12-26 RX ORDER — LISINOPRIL 10 MG/1
10 TABLET ORAL DAILY
Qty: 90 TABLET | Refills: 0 | Status: SHIPPED | OUTPATIENT
Start: 2024-12-26

## 2024-12-27 ENCOUNTER — OFFICE VISIT (OUTPATIENT)
Dept: FAMILY MEDICINE | Facility: CLINIC | Age: 64
End: 2024-12-27
Payer: COMMERCIAL

## 2024-12-27 VITALS
SYSTOLIC BLOOD PRESSURE: 98 MMHG | HEART RATE: 90 BPM | BODY MASS INDEX: 28.62 KG/M2 | OXYGEN SATURATION: 95 % | TEMPERATURE: 97.8 F | WEIGHT: 199.9 LBS | RESPIRATION RATE: 15 BRPM | DIASTOLIC BLOOD PRESSURE: 67 MMHG | HEIGHT: 70 IN

## 2024-12-27 DIAGNOSIS — I10 BENIGN ESSENTIAL HYPERTENSION: ICD-10-CM

## 2024-12-27 DIAGNOSIS — E11.9 TYPE 2 DIABETES MELLITUS WITHOUT COMPLICATION, WITHOUT LONG-TERM CURRENT USE OF INSULIN (H): ICD-10-CM

## 2024-12-27 DIAGNOSIS — Z13.6 CARDIOVASCULAR SCREENING; LDL GOAL LESS THAN 130: Primary | ICD-10-CM

## 2024-12-27 LAB
CHOLEST SERPL-MCNC: 118 MG/DL
CREAT UR-MCNC: 148 MG/DL
EST. AVERAGE GLUCOSE BLD GHB EST-MCNC: 143 MG/DL
FASTING STATUS PATIENT QL REPORTED: NO
HBA1C MFR BLD: 6.6 % (ref 0–5.6)
HDLC SERPL-MCNC: 35 MG/DL
HOLD SPECIMEN: NORMAL
LDLC SERPL CALC-MCNC: 32 MG/DL
MICROALBUMIN UR-MCNC: 14.8 MG/L
MICROALBUMIN/CREAT UR: 10 MG/G CR (ref 0–17)
NONHDLC SERPL-MCNC: 83 MG/DL
TRIGL SERPL-MCNC: 257 MG/DL

## 2024-12-27 PROCEDURE — 99214 OFFICE O/P EST MOD 30 MIN: CPT | Performed by: FAMILY MEDICINE

## 2024-12-27 PROCEDURE — 82570 ASSAY OF URINE CREATININE: CPT | Performed by: FAMILY MEDICINE

## 2024-12-27 PROCEDURE — 80061 LIPID PANEL: CPT | Performed by: FAMILY MEDICINE

## 2024-12-27 PROCEDURE — 36415 COLL VENOUS BLD VENIPUNCTURE: CPT | Performed by: FAMILY MEDICINE

## 2024-12-27 PROCEDURE — 83036 HEMOGLOBIN GLYCOSYLATED A1C: CPT | Performed by: FAMILY MEDICINE

## 2024-12-27 PROCEDURE — 82043 UR ALBUMIN QUANTITATIVE: CPT | Performed by: FAMILY MEDICINE

## 2024-12-27 RX ORDER — ASPIRIN 81 MG/1
81 TABLET, CHEWABLE ORAL DAILY
COMMUNITY

## 2024-12-27 RX ORDER — METFORMIN HYDROCHLORIDE 500 MG/1
1000 TABLET, EXTENDED RELEASE ORAL 2 TIMES DAILY WITH MEALS
Qty: 360 TABLET | Refills: 3 | Status: SHIPPED | OUTPATIENT
Start: 2024-12-27

## 2024-12-27 RX ORDER — LISINOPRIL 10 MG/1
10 TABLET ORAL DAILY
Qty: 90 TABLET | Refills: 1 | Status: SHIPPED | OUTPATIENT
Start: 2024-12-27

## 2024-12-27 NOTE — PROGRESS NOTES
Assessment and Plan    (Z13.6) CARDIOVASCULAR SCREENING; LDL GOAL LESS THAN 130  (primary encounter diagnosis)  Comment:   Plan: Lipid panel reflex to direct LDL Non-fasting            (E11.9) Type 2 diabetes mellitus without complication, without long-term current use of insulin (H)  Comment: A1c at goal, refillin gmeds  Plan: HEMOGLOBIN A1C, Albumin Random Urine         Quantitative with Creat Ratio, Lipid panel         reflex to direct LDL Non-fasting, metFORMIN         (GLUCOPHAGE XR) 500 MG 24 hr tablet            (I10) Benign essential hypertension  Comment: BP looks good, refill  Plan: lisinopril (ZESTRIL) 10 MG tablet              RTC in 3m DM f/u    Bharath Love MD     Guru Lima is a 64 year old, presenting for the following health issues:  Follow Up (DM)        12/27/2024     1:40 PM   Additional Questions   Roomed by Milana VELASCO   Accompanied by self         12/27/2024     1:40 PM   Patient Reported Additional Medications   Patient reports taking the following new medications n/a     History of Present Illness       Diabetes:   He presents for follow up of diabetes.  He is checking home blood glucose one time daily.   He checks blood glucose before meals.  Blood glucose is never over 200 and never under 70. He is aware of hypoglycemia symptoms including weakness.    He has no concerns regarding his diabetes at this time.   He is not experiencing numbness or burning in feet, excessive thirst, blurry vision, weight changes or redness, sores or blisters on feet. The patient has not had a diabetic eye exam in the last 12 months.          He eats 0-1 servings of fruits and vegetables daily.He consumes 3 sweetened beverage(s) daily.He exercises with enough effort to increase his heart rate 20 to 29 minutes per day.    He is taking medications regularly.       Knee is feeling well although he thinks it's a bit swollen still.    Otherwise feeling well.  Tracks BP at home.  Denies CP, palpitations, edema,  "dyspnea, HA, vision changes.         Objective    BP 98/67 (BP Location: Right arm, Patient Position: Sitting, Cuff Size: Adult Large)   Pulse 90   Temp 97.8  F (36.6  C) (Oral)   Resp 15   Ht 1.765 m (5' 9.5\")   Wt 90.7 kg (199 lb 14.4 oz)   SpO2 95%   BMI 29.10 kg/m    Body mass index is 29.1 kg/m .  Physical Exam  Vitals and nursing note reviewed.   HENT:      Head: Normocephalic.   Eyes:      Conjunctiva/sclera: Conjunctivae normal.   Cardiovascular:      Rate and Rhythm: Normal rate and regular rhythm.      Heart sounds: Normal heart sounds.   Pulmonary:      Effort: Pulmonary effort is normal.      Breath sounds: Normal breath sounds.   Skin:     General: Skin is warm and dry.   Neurological:      General: No focal deficit present.      Mental Status: He is alert and oriented to person, place, and time.   Psychiatric:         Mood and Affect: Mood normal.         Behavior: Behavior normal.              Signed Electronically by: Bharath Love MD    "

## 2025-02-15 DIAGNOSIS — E78.5 HYPERLIPIDEMIA LDL GOAL <100: ICD-10-CM

## 2025-02-17 RX ORDER — ATORVASTATIN CALCIUM 20 MG/1
20 TABLET, FILM COATED ORAL DAILY
Qty: 90 TABLET | Refills: 1 | OUTPATIENT
Start: 2025-02-17

## 2025-02-23 ENCOUNTER — HEALTH MAINTENANCE LETTER (OUTPATIENT)
Age: 65
End: 2025-02-23

## 2025-03-28 ENCOUNTER — OFFICE VISIT (OUTPATIENT)
Dept: FAMILY MEDICINE | Facility: CLINIC | Age: 65
End: 2025-03-28
Attending: FAMILY MEDICINE
Payer: COMMERCIAL

## 2025-03-28 VITALS
HEART RATE: 59 BPM | WEIGHT: 204.1 LBS | RESPIRATION RATE: 16 BRPM | DIASTOLIC BLOOD PRESSURE: 77 MMHG | HEIGHT: 70 IN | TEMPERATURE: 97.8 F | SYSTOLIC BLOOD PRESSURE: 111 MMHG | OXYGEN SATURATION: 100 % | BODY MASS INDEX: 29.22 KG/M2

## 2025-03-28 DIAGNOSIS — M17.0 PRIMARY OSTEOARTHRITIS OF BOTH KNEES: ICD-10-CM

## 2025-03-28 DIAGNOSIS — E78.5 HYPERLIPIDEMIA LDL GOAL <100: ICD-10-CM

## 2025-03-28 DIAGNOSIS — E11.9 TYPE 2 DIABETES MELLITUS WITHOUT COMPLICATION, WITHOUT LONG-TERM CURRENT USE OF INSULIN (H): Primary | ICD-10-CM

## 2025-03-28 LAB
EST. AVERAGE GLUCOSE BLD GHB EST-MCNC: 180 MG/DL
HBA1C MFR BLD: 7.9 % (ref 0–5.6)
HOLD SPECIMEN: NORMAL

## 2025-03-28 PROCEDURE — 3074F SYST BP LT 130 MM HG: CPT | Performed by: FAMILY MEDICINE

## 2025-03-28 PROCEDURE — G2211 COMPLEX E/M VISIT ADD ON: HCPCS | Performed by: FAMILY MEDICINE

## 2025-03-28 PROCEDURE — 36415 COLL VENOUS BLD VENIPUNCTURE: CPT | Performed by: FAMILY MEDICINE

## 2025-03-28 PROCEDURE — 83036 HEMOGLOBIN GLYCOSYLATED A1C: CPT | Performed by: FAMILY MEDICINE

## 2025-03-28 PROCEDURE — 99214 OFFICE O/P EST MOD 30 MIN: CPT | Performed by: FAMILY MEDICINE

## 2025-03-28 PROCEDURE — 3078F DIAST BP <80 MM HG: CPT | Performed by: FAMILY MEDICINE

## 2025-03-28 RX ORDER — ATORVASTATIN CALCIUM 20 MG/1
20 TABLET, FILM COATED ORAL DAILY
Qty: 90 TABLET | Refills: 3 | Status: SHIPPED | OUTPATIENT
Start: 2025-03-28

## 2025-03-28 NOTE — PROGRESS NOTES
Assessment and Plan    (E11.9) Type 2 diabetes mellitus without complication, without long-term current use of insulin (H)  (primary encounter diagnosis)  Comment: adding in SGLT2i, glucose control has been consistently short of goal  Plan: HEMOGLOBIN A1C, empagliflozin (JARDIANCE) 25 MG        TABS tablet            (M17.0) Primary osteoarthritis of both knees  Comment: Happy with recent TKR, not sure about doing other yet  Plan:       (E78.5) Hyperlipidemia LDL goal <100  Comment: refilling meds, labs UTD  Plan: atorvastatin (LIPITOR) 20 MG tablet              RTC in     Bharath Love MD     The longitudinal plan of care for the diagnosis(es)/condition(s) as documented were addressed during this visit. Due to the added complexity in care, I will continue to support patient in the subsequent management and with ongoing continuity of care.      Guru Lima is a 65 year old, presenting for the following health issues:  Follow Up (DM)        3/28/2025     8:38 AM   Additional Questions   Roomed by Milana VELASCO   Accompanied by julio wife         3/28/2025     8:38 AM   Patient Reported Additional Medications   Patient reports taking the following new medications n/a     History of Present Illness       Diabetes:   He presents for follow up of diabetes.    He is not checking blood glucose.         He has no concerns regarding his diabetes at this time.   He is not experiencing numbness or burning in feet, excessive thirst, blurry vision, weight changes or redness, sores or blisters on feet. The patient has not had a diabetic eye exam in the last 12 months.               Admits to drink lots of soda.  Otherwise well.  No acute concerns.    New knee is doing much better.    Denies CP, palpitations, edema, dyspnea, HA, vision changes.         Objective    /77 (BP Location: Right arm, Patient Position: Sitting, Cuff Size: Adult Large)   Pulse 59   Temp 97.8  F (36.6  C) (Oral)   Resp 16   Ht 1.765 m (5'  "9.5\")   Wt 92.6 kg (204 lb 1.6 oz)   SpO2 100%   BMI 29.71 kg/m    Body mass index is 29.71 kg/m .  Physical Exam  Vitals and nursing note reviewed.   HENT:      Head: Normocephalic.   Eyes:      Conjunctiva/sclera: Conjunctivae normal.   Cardiovascular:      Rate and Rhythm: Normal rate and regular rhythm.      Heart sounds: Normal heart sounds.   Pulmonary:      Effort: Pulmonary effort is normal.      Breath sounds: Normal breath sounds.   Skin:     General: Skin is warm and dry.   Neurological:      General: No focal deficit present.      Mental Status: He is alert and oriented to person, place, and time.   Psychiatric:         Mood and Affect: Mood normal.         Behavior: Behavior normal.                    Signed Electronically by: Bharath Love MD    "

## 2025-03-31 PROBLEM — E78.5 HYPERLIPIDEMIA LDL GOAL <100: Status: ACTIVE | Noted: 2025-03-31

## 2025-05-05 PROBLEM — Z96.651: Status: RESOLVED | Noted: 2024-10-09 | Resolved: 2025-05-05

## 2025-05-05 PROBLEM — M25.561 RIGHT KNEE PAIN: Status: RESOLVED | Noted: 2021-03-03 | Resolved: 2025-05-05

## 2025-07-06 ENCOUNTER — HEALTH MAINTENANCE LETTER (OUTPATIENT)
Age: 65
End: 2025-07-06

## 2025-07-11 ENCOUNTER — OFFICE VISIT (OUTPATIENT)
Dept: FAMILY MEDICINE | Facility: CLINIC | Age: 65
End: 2025-07-11
Attending: FAMILY MEDICINE
Payer: COMMERCIAL

## 2025-07-11 VITALS
DIASTOLIC BLOOD PRESSURE: 81 MMHG | BODY MASS INDEX: 29.76 KG/M2 | RESPIRATION RATE: 18 BRPM | HEART RATE: 78 BPM | WEIGHT: 200.9 LBS | TEMPERATURE: 98.1 F | SYSTOLIC BLOOD PRESSURE: 108 MMHG | HEIGHT: 69 IN | OXYGEN SATURATION: 95 %

## 2025-07-11 DIAGNOSIS — I10 BENIGN ESSENTIAL HYPERTENSION: ICD-10-CM

## 2025-07-11 DIAGNOSIS — E11.9 TYPE 2 DIABETES MELLITUS WITHOUT COMPLICATION, WITHOUT LONG-TERM CURRENT USE OF INSULIN (H): Primary | ICD-10-CM

## 2025-07-11 LAB
EST. AVERAGE GLUCOSE BLD GHB EST-MCNC: 146 MG/DL
HBA1C MFR BLD: 6.7 % (ref 0–5.6)

## 2025-07-11 PROCEDURE — 99213 OFFICE O/P EST LOW 20 MIN: CPT | Performed by: FAMILY MEDICINE

## 2025-07-11 PROCEDURE — 83036 HEMOGLOBIN GLYCOSYLATED A1C: CPT | Performed by: FAMILY MEDICINE

## 2025-07-11 PROCEDURE — 3079F DIAST BP 80-89 MM HG: CPT | Performed by: FAMILY MEDICINE

## 2025-07-11 PROCEDURE — 3044F HG A1C LEVEL LT 7.0%: CPT | Performed by: FAMILY MEDICINE

## 2025-07-11 PROCEDURE — 36415 COLL VENOUS BLD VENIPUNCTURE: CPT | Performed by: FAMILY MEDICINE

## 2025-07-11 PROCEDURE — 3074F SYST BP LT 130 MM HG: CPT | Performed by: FAMILY MEDICINE

## 2025-07-11 PROCEDURE — G2211 COMPLEX E/M VISIT ADD ON: HCPCS | Performed by: FAMILY MEDICINE

## 2025-07-11 RX ORDER — LISINOPRIL 10 MG/1
10 TABLET ORAL DAILY
Qty: 90 TABLET | Refills: 1 | Status: SHIPPED | OUTPATIENT
Start: 2025-07-11

## (undated) DEVICE — ESU PENCIL SMOKE EVAC W/ROCKER SWITCH 0703-047-000

## (undated) DEVICE — BUR OVAL LINVATEC 4X8MM 5091-236

## (undated) DEVICE — SUTURE VICRYL+ 2 27IN CP UNDYED VCP195H

## (undated) DEVICE — SU STRATAFIX PDS PLUS 1 CT-1 18" SXPP1A404

## (undated) DEVICE — DRSG AQUACEL AG HYDROFIBER  3.5X10" 422605

## (undated) DEVICE — DRSG AQUACEL AG 3.5X14"  422607

## (undated) DEVICE — DRAPE EXTREMITY W/ARMBOARD 29405

## (undated) DEVICE — SOL NACL 0.9% IRRIG 3000ML BAG 2B7477

## (undated) DEVICE — SU MONOCRYL 3-0 PS-2 27" Y427H

## (undated) DEVICE — BLADE CLIPPER SGL USE 9680

## (undated) DEVICE — SPONGE LAP 18X18" X8435

## (undated) DEVICE — SU ETHILON 4-0 PS-2 18" BLACK 1667H

## (undated) DEVICE — Device

## (undated) DEVICE — STOCKING SLEEVE VASOPRESS COMPRESSION CALF MED VP501M

## (undated) DEVICE — DRAIN HEMOVAC RESERVOIR KIT 19FR 1/4" LG 00-2550-004-10

## (undated) DEVICE — BLADE SAW RECIP STRK 70X12.5X0.80MM 0277-096-277

## (undated) DEVICE — BONE CLEANING TIP INTERPULSE  0210-010-000

## (undated) DEVICE — SOL WATER IRRIG 1000ML BOTTLE 2F7114

## (undated) DEVICE — TOURNIQUET SGL  BLADDER 30"X4" BLUE 5921030135

## (undated) DEVICE — SU VICRYL 2-0 CT-1 27" UND J259H

## (undated) DEVICE — ESU GROUND PAD UNIVERSAL W/O CORD

## (undated) DEVICE — TUBING ARTHROSCOPY PUMP ARTHREX AR-6410

## (undated) DEVICE — SU VICRYL 0 CT-1 36" J946H

## (undated) DEVICE — DRAPE POUCH INSTRUMENT 1018

## (undated) DEVICE — BLADE SAW SAGITTAL STRK 18X90X1.19MM HD SYS 6 6118-119-090

## (undated) DEVICE — GLOVE BIOGEL PI INDICATOR 8.0 LF 41680

## (undated) DEVICE — SU DERMABOND ADVANCED .7ML DNX12

## (undated) DEVICE — BNDG ESMARK 6" STERILE

## (undated) DEVICE — HOOD FLYTE 0408-800-000

## (undated) DEVICE — DRAPE MAYO STAND 23X54 8337

## (undated) DEVICE — BUR STRK CARBIDE MATCH HEAD 3.0MM 5820-107-530C

## (undated) DEVICE — SU VICRYL 1 CT-1 27" J341H

## (undated) DEVICE — SUCTION IRR SYSTEM W/O TIP INTERPULSE HANDPIECE 0210-100-000

## (undated) DEVICE — GLOVE BIOGEL PI ULTRATOUCH G SZ 8.5 42185

## (undated) DEVICE — DRAPE IOBAN INCISE 23X17" 6650EZ

## (undated) DEVICE — SUCTION MANIFOLD NEPTUNE 2 SYS 4 PORT 0702-020-000

## (undated) DEVICE — SU ETHIBOND 2 V-37 4X30" MX69G

## (undated) DEVICE — BONE CEMENT MIXEVAC HI VAC W/CARTRIDGE 0306-563-000

## (undated) DEVICE — GLOVE UNDER INDICATOR PI SZ 6.5 LF 41665

## (undated) DEVICE — PREP CHLORAPREP 26ML TINTED ORANGE  260815

## (undated) DEVICE — TUBING SUCTION 12"X1/4" N612

## (undated) DEVICE — SU MONOCRYL 3-0 PS-2 18" UND Y497G

## (undated) DEVICE — PACK TOTAL JOINT STD LATEX

## (undated) DEVICE — FILTER HEPA FLUID TRAP NEPTUNE 0703-040-001

## (undated) DEVICE — GLOVE BIOGEL PI ULTRATOUCH G SZ 6.5 42165

## (undated) RX ORDER — ONDANSETRON 2 MG/ML
INJECTION INTRAMUSCULAR; INTRAVENOUS
Status: DISPENSED
Start: 2024-10-03

## (undated) RX ORDER — BUPIVACAINE HYDROCHLORIDE AND EPINEPHRINE 5; 5 MG/ML; UG/ML
INJECTION, SOLUTION EPIDURAL; INTRACAUDAL; PERINEURAL
Status: DISPENSED
Start: 2024-10-03

## (undated) RX ORDER — FENTANYL CITRATE 50 UG/ML
INJECTION, SOLUTION INTRAMUSCULAR; INTRAVENOUS
Status: DISPENSED
Start: 2024-10-03

## (undated) RX ORDER — PROPOFOL 10 MG/ML
INJECTION, EMULSION INTRAVENOUS
Status: DISPENSED
Start: 2024-10-03

## (undated) RX ORDER — LIDOCAINE HYDROCHLORIDE 10 MG/ML
INJECTION, SOLUTION EPIDURAL; INFILTRATION; INTRACAUDAL; PERINEURAL
Status: DISPENSED
Start: 2024-10-03

## (undated) RX ORDER — SODIUM CHLORIDE 9 MG/ML
INJECTION, SOLUTION INTRAVENOUS
Status: DISPENSED
Start: 2024-10-03

## (undated) RX ORDER — EPINEPHRINE 1 MG/ML
INJECTION, SOLUTION INTRAMUSCULAR; SUBCUTANEOUS
Status: DISPENSED
Start: 2024-10-03

## (undated) RX ORDER — BUPIVACAINE HYDROCHLORIDE 2.5 MG/ML
INJECTION, SOLUTION EPIDURAL; INFILTRATION; INTRACAUDAL
Status: DISPENSED
Start: 2024-10-03

## (undated) RX ORDER — DEXAMETHASONE SODIUM PHOSPHATE 10 MG/ML
INJECTION, SOLUTION INTRAMUSCULAR; INTRAVENOUS
Status: DISPENSED
Start: 2024-10-03

## (undated) RX ORDER — PHENYLEPHRINE HYDROCHLORIDE 10 MG/ML
INJECTION INTRAVENOUS
Status: DISPENSED
Start: 2024-10-03

## (undated) RX ORDER — DIMENHYDRINATE 50 MG/ML
INJECTION, SOLUTION INTRAMUSCULAR; INTRAVENOUS
Status: DISPENSED
Start: 2024-10-03